# Patient Record
Sex: MALE | Race: BLACK OR AFRICAN AMERICAN | Employment: OTHER | URBAN - METROPOLITAN AREA
[De-identification: names, ages, dates, MRNs, and addresses within clinical notes are randomized per-mention and may not be internally consistent; named-entity substitution may affect disease eponyms.]

---

## 2020-12-01 ENCOUNTER — APPOINTMENT (OUTPATIENT)
Dept: CT IMAGING | Age: 20
DRG: 004 | End: 2020-12-01
Payer: MEDICAID

## 2020-12-01 ENCOUNTER — HOSPITAL ENCOUNTER (INPATIENT)
Age: 20
LOS: 10 days | Discharge: NURSING, REHAB OR LONG-TERM CARE | DRG: 004 | End: 2020-12-11
Attending: EMERGENCY MEDICINE | Admitting: SURGERY
Payer: MEDICAID

## 2020-12-01 ENCOUNTER — ANESTHESIA (OUTPATIENT)
Dept: OPERATING ROOM | Age: 20
DRG: 004 | End: 2020-12-01
Payer: MEDICAID

## 2020-12-01 ENCOUNTER — APPOINTMENT (OUTPATIENT)
Dept: GENERAL RADIOLOGY | Age: 20
DRG: 004 | End: 2020-12-01
Payer: MEDICAID

## 2020-12-01 ENCOUNTER — ANESTHESIA EVENT (OUTPATIENT)
Dept: OPERATING ROOM | Age: 20
DRG: 004 | End: 2020-12-01
Payer: MEDICAID
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
## 2020-12-01 DIAGNOSIS — S12.600A CLOSED DISPLACED FRACTURE OF SEVENTH CERVICAL VERTEBRA, UNSPECIFIED FRACTURE MORPHOLOGY, INITIAL ENCOUNTER (HCC): ICD-10-CM

## 2020-12-01 LAB
-: NORMAL
ALLEN TEST: ABNORMAL
ALLEN TEST: ABNORMAL
ALLEN TEST: POSITIVE
AMORPHOUS: NORMAL
AMPHETAMINE SCREEN URINE: NEGATIVE
ANION GAP SERPL CALCULATED.3IONS-SCNC: 12 MMOL/L (ref 9–17)
ANION GAP: 6 MMOL/L (ref 7–16)
ANION GAP: 9 MMOL/L (ref 7–16)
BACTERIA: NORMAL
BARBITURATE SCREEN URINE: NEGATIVE
BENZODIAZEPINE SCREEN, URINE: NEGATIVE
BILIRUBIN URINE: NEGATIVE
BLOOD BANK SPECIMEN: ABNORMAL
BUN BLDV-MCNC: 13 MG/DL (ref 6–20)
BUPRENORPHINE URINE: ABNORMAL
CANNABINOID SCREEN URINE: POSITIVE
CARBOXYHEMOGLOBIN: 3.5 % (ref 0–5)
CASTS UA: NORMAL /LPF (ref 0–8)
CHLORIDE BLD-SCNC: 111 MMOL/L (ref 98–107)
CO2: 22 MMOL/L (ref 20–31)
COCAINE METABOLITE, URINE: NEGATIVE
COLOR: YELLOW
COMMENT UA: ABNORMAL
CREAT SERPL-MCNC: 0.78 MG/DL (ref 0.7–1.2)
CRYSTALS, UA: NORMAL /HPF
EPITHELIAL CELLS UA: NORMAL /HPF (ref 0–5)
ETHANOL PERCENT: <0.01 %
ETHANOL: <10 MG/DL
FIO2: 100
FIO2: 40
FIO2: ABNORMAL
GFR AFRICAN AMERICAN: >60 ML/MIN
GFR NON-AFRICAN AMERICAN: >60 ML/MIN
GFR SERPL CREATININE-BSD FRML MDRD: >60 ML/MIN
GFR SERPL CREATININE-BSD FRML MDRD: >60 ML/MIN
GFR SERPL CREATININE-BSD FRML MDRD: ABNORMAL ML/MIN/{1.73_M2}
GFR SERPL CREATININE-BSD FRML MDRD: ABNORMAL ML/MIN/{1.73_M2}
GFR SERPL CREATININE-BSD FRML MDRD: NORMAL ML/MIN/{1.73_M2}
GFR SERPL CREATININE-BSD FRML MDRD: NORMAL ML/MIN/{1.73_M2}
GLUCOSE BLD-MCNC: 112 MG/DL (ref 74–100)
GLUCOSE BLD-MCNC: 123 MG/DL (ref 70–99)
GLUCOSE BLD-MCNC: 135 MG/DL (ref 74–100)
GLUCOSE URINE: NEGATIVE
HCG QUALITATIVE: ABNORMAL
HCO3 VENOUS: 22.5 MMOL/L (ref 24–30)
HCT VFR BLD CALC: 37.4 % (ref 40.7–50.3)
HEMOGLOBIN: 12.5 G/DL (ref 13–17)
INR BLD: 1.1
KETONES, URINE: NEGATIVE
LEUKOCYTE ESTERASE, URINE: ABNORMAL
MCH RBC QN AUTO: 31.1 PG (ref 25.2–33.5)
MCHC RBC AUTO-ENTMCNC: 33.4 G/DL (ref 28.4–34.8)
MCV RBC AUTO: 93 FL (ref 82.6–102.9)
MDMA URINE: ABNORMAL
METHADONE SCREEN, URINE: NEGATIVE
METHAMPHETAMINE, URINE: ABNORMAL
METHEMOGLOBIN: ABNORMAL % (ref 0–1.5)
MODE: ABNORMAL
MODE: ABNORMAL
MODE: AC
MUCUS: NORMAL
NEGATIVE BASE EXCESS, ART: 2 (ref 0–2)
NEGATIVE BASE EXCESS, ART: 2 (ref 0–2)
NEGATIVE BASE EXCESS, VEN: 2.3 MMOL/L (ref 0–2)
NITRITE, URINE: NEGATIVE
NOTIFICATION TIME: ABNORMAL
NOTIFICATION: ABNORMAL
NRBC AUTOMATED: 0 PER 100 WBC
O2 DEVICE/FLOW/%: ABNORMAL
O2 SAT, VEN: 97.2 % (ref 60–85)
OPIATES, URINE: NEGATIVE
OTHER OBSERVATIONS UA: NORMAL
OXYCODONE SCREEN URINE: NEGATIVE
OXYHEMOGLOBIN: ABNORMAL % (ref 95–98)
PARTIAL THROMBOPLASTIN TIME: 21 SEC (ref 20.5–30.5)
PATIENT TEMP: 37
PATIENT TEMP: ABNORMAL
PATIENT TEMP: ABNORMAL
PCO2, VEN, TEMP ADJ: ABNORMAL MMHG (ref 39–55)
PCO2, VEN: 41.3 (ref 39–55)
PDW BLD-RTO: 13.3 % (ref 11.8–14.4)
PEEP/CPAP: ABNORMAL
PH UA: 8 (ref 5–8)
PH VENOUS: 7.36 (ref 7.32–7.42)
PH, VEN, TEMP ADJ: ABNORMAL (ref 7.32–7.42)
PHENCYCLIDINE, URINE: NEGATIVE
PLATELET # BLD: 184 K/UL (ref 138–453)
PMV BLD AUTO: 10.5 FL (ref 8.1–13.5)
PO2, VEN, TEMP ADJ: ABNORMAL MMHG (ref 30–50)
PO2, VEN: 102 (ref 30–50)
POC CHLORIDE: 105 MMOL/L (ref 98–107)
POC CHLORIDE: 107 MMOL/L (ref 98–107)
POC CREATININE: 0.88 MG/DL (ref 0.51–1.19)
POC CREATININE: 0.9 MG/DL (ref 0.51–1.19)
POC HCO3: 27.1 MMOL/L (ref 21–28)
POC HCO3: 27.5 MMOL/L (ref 21–28)
POC HEMATOCRIT: 30 % (ref 41–53)
POC HEMATOCRIT: 33 % (ref 41–53)
POC HEMOGLOBIN: 10.3 G/DL (ref 13.5–17.5)
POC HEMOGLOBIN: 11.2 G/DL (ref 13.5–17.5)
POC IONIZED CALCIUM: 1.19 MMOL/L (ref 1.15–1.33)
POC IONIZED CALCIUM: 1.22 MMOL/L (ref 1.15–1.33)
POC LACTIC ACID: 0.86 MMOL/L (ref 0.56–1.39)
POC LACTIC ACID: 1.22 MMOL/L (ref 0.56–1.39)
POC O2 SATURATION: 100 % (ref 94–98)
POC O2 SATURATION: 99 % (ref 94–98)
POC PCO2 TEMP: ABNORMAL MM HG
POC PCO2 TEMP: ABNORMAL MM HG
POC PCO2: 70.9 MM HG (ref 35–48)
POC PCO2: 75 MM HG (ref 35–48)
POC PH TEMP: ABNORMAL
POC PH TEMP: ABNORMAL
POC PH: 7.17 (ref 7.35–7.45)
POC PH: 7.19 (ref 7.35–7.45)
POC PO2 TEMP: ABNORMAL MM HG
POC PO2 TEMP: ABNORMAL MM HG
POC PO2: 168 MM HG (ref 83–108)
POC PO2: 566.3 MM HG (ref 83–108)
POC POTASSIUM: 5.4 MMOL/L (ref 3.5–4.5)
POC POTASSIUM: 6.3 MMOL/L (ref 3.5–4.5)
POC SODIUM: 140 MMOL/L (ref 138–146)
POC SODIUM: 141 MMOL/L (ref 138–146)
POSITIVE BASE EXCESS, ART: ABNORMAL (ref 0–3)
POSITIVE BASE EXCESS, ART: ABNORMAL (ref 0–3)
POSITIVE BASE EXCESS, VEN: ABNORMAL MMOL/L (ref 0–2)
POTASSIUM SERPL-SCNC: 3.2 MMOL/L (ref 3.7–5.3)
PROPOXYPHENE, URINE: ABNORMAL
PROTEIN UA: NEGATIVE
PROTHROMBIN TIME: 11.8 SEC (ref 9–12)
PSV: ABNORMAL
PT. POSITION: ABNORMAL
RBC # BLD: 4.02 M/UL (ref 4.21–5.77)
RBC UA: NORMAL /HPF (ref 0–4)
RENAL EPITHELIAL, UA: NORMAL /HPF
RESPIRATORY RATE: ABNORMAL
SAMPLE SITE: ABNORMAL
SARS-COV-2, RAPID: NOT DETECTED
SARS-COV-2: NORMAL
SARS-COV-2: NORMAL
SET RATE: ABNORMAL
SODIUM BLD-SCNC: 145 MMOL/L (ref 135–144)
SOURCE: NORMAL
SPECIFIC GRAVITY UA: 1.05 (ref 1–1.03)
TCO2 (CALC), ART: 29 MMOL/L (ref 22–29)
TCO2 (CALC), ART: 30 MMOL/L (ref 22–29)
TEST INFORMATION: ABNORMAL
TEXT FOR RESPIRATORY: ABNORMAL
TOTAL HB: ABNORMAL G/DL (ref 12–16)
TOTAL RATE: ABNORMAL
TRICHOMONAS: NORMAL
TRICYCLIC ANTIDEPRESSANTS, UR: ABNORMAL
TURBIDITY: ABNORMAL
URINE HGB: NEGATIVE
UROBILINOGEN, URINE: NORMAL
VT: ABNORMAL
WBC # BLD: 12.9 K/UL (ref 4.5–13.5)
WBC UA: NORMAL /HPF (ref 0–5)
YEAST: NORMAL

## 2020-12-01 PROCEDURE — L0120 CERV FLEX N/ADJ FOAM PRE OTS: HCPCS | Performed by: NEUROLOGICAL SURGERY

## 2020-12-01 PROCEDURE — 85014 HEMATOCRIT: CPT

## 2020-12-01 PROCEDURE — 3209999900 CT THORACIC SPINE TRAUMA RECONSTRUCTION

## 2020-12-01 PROCEDURE — 5A1955Z RESPIRATORY VENTILATION, GREATER THAN 96 CONSECUTIVE HOURS: ICD-10-PCS | Performed by: SURGERY

## 2020-12-01 PROCEDURE — 6360000002 HC RX W HCPCS

## 2020-12-01 PROCEDURE — 86850 RBC ANTIBODY SCREEN: CPT

## 2020-12-01 PROCEDURE — 71045 X-RAY EXAM CHEST 1 VIEW: CPT

## 2020-12-01 PROCEDURE — 74174 CTA ABD&PLVS W/CONTRAST: CPT

## 2020-12-01 PROCEDURE — 6360000004 HC RX CONTRAST MEDICATION: Performed by: STUDENT IN AN ORGANIZED HEALTH CARE EDUCATION/TRAINING PROGRAM

## 2020-12-01 PROCEDURE — 2580000003 HC RX 258: Performed by: NURSE ANESTHETIST, CERTIFIED REGISTERED

## 2020-12-01 PROCEDURE — 0RT30ZZ RESECTION OF CERVICAL VERTEBRAL DISC, OPEN APPROACH: ICD-10-PCS | Performed by: NEUROLOGICAL SURGERY

## 2020-12-01 PROCEDURE — 22554 ARTHRD ANT NTRBD MIN DSC CRV: CPT | Performed by: NEUROLOGICAL SURGERY

## 2020-12-01 PROCEDURE — 81001 URINALYSIS AUTO W/SCOPE: CPT

## 2020-12-01 PROCEDURE — 80051 ELECTROLYTE PANEL: CPT

## 2020-12-01 PROCEDURE — 0RG4070 FUSION OF CERVICOTHORACIC VERTEBRAL JOINT WITH AUTOLOGOUS TISSUE SUBSTITUTE, ANTERIOR APPROACH, ANTERIOR COLUMN, OPEN APPROACH: ICD-10-PCS | Performed by: NEUROLOGICAL SURGERY

## 2020-12-01 PROCEDURE — 84295 ASSAY OF SERUM SODIUM: CPT

## 2020-12-01 PROCEDURE — 2780000010 HC IMPLANT OTHER: Performed by: NEUROLOGICAL SURGERY

## 2020-12-01 PROCEDURE — 3700000001 HC ADD 15 MINUTES (ANESTHESIA): Performed by: NEUROLOGICAL SURGERY

## 2020-12-01 PROCEDURE — 70450 CT HEAD/BRAIN W/O DYE: CPT

## 2020-12-01 PROCEDURE — 99254 IP/OBS CNSLTJ NEW/EST MOD 60: CPT | Performed by: SURGERY

## 2020-12-01 PROCEDURE — 83605 ASSAY OF LACTIC ACID: CPT

## 2020-12-01 PROCEDURE — 6360000002 HC RX W HCPCS: Performed by: NURSE ANESTHETIST, CERTIFIED REGISTERED

## 2020-12-01 PROCEDURE — 82947 ASSAY GLUCOSE BLOOD QUANT: CPT

## 2020-12-01 PROCEDURE — 2700000000 HC OXYGEN THERAPY PER DAY

## 2020-12-01 PROCEDURE — 22845 INSERT SPINE FIXATION DEVICE: CPT | Performed by: NEUROLOGICAL SURGERY

## 2020-12-01 PROCEDURE — 22854 INSJ BIOMECHANICAL DEVICE: CPT | Performed by: NEUROLOGICAL SURGERY

## 2020-12-01 PROCEDURE — 0RG10A0 FUSION OF CERVICAL VERTEBRAL JOINT WITH INTERBODY FUSION DEVICE, ANTERIOR APPROACH, ANTERIOR COLUMN, OPEN APPROACH: ICD-10-PCS | Performed by: NEUROLOGICAL SURGERY

## 2020-12-01 PROCEDURE — C1889 IMPLANT/INSERT DEVICE, NOC: HCPCS | Performed by: NEUROLOGICAL SURGERY

## 2020-12-01 PROCEDURE — 70250 X-RAY EXAM OF SKULL: CPT

## 2020-12-01 PROCEDURE — 0DJ08ZZ INSPECTION OF UPPER INTESTINAL TRACT, VIA NATURAL OR ARTIFICIAL OPENING ENDOSCOPIC: ICD-10-PCS | Performed by: SURGERY

## 2020-12-01 PROCEDURE — 99284 EMERGENCY DEPT VISIT MOD MDM: CPT

## 2020-12-01 PROCEDURE — 99223 1ST HOSP IP/OBS HIGH 75: CPT | Performed by: NEUROLOGICAL SURGERY

## 2020-12-01 PROCEDURE — 37799 UNLISTED PX VASCULAR SURGERY: CPT

## 2020-12-01 PROCEDURE — 80307 DRUG TEST PRSMV CHEM ANLYZR: CPT

## 2020-12-01 PROCEDURE — 2709999900 HC NON-CHARGEABLE SUPPLY: Performed by: NEUROLOGICAL SURGERY

## 2020-12-01 PROCEDURE — 99283 EMERGENCY DEPT VISIT LOW MDM: CPT

## 2020-12-01 PROCEDURE — U0002 COVID-19 LAB TEST NON-CDC: HCPCS

## 2020-12-01 PROCEDURE — 86900 BLOOD TYPING SEROLOGIC ABO: CPT

## 2020-12-01 PROCEDURE — 84132 ASSAY OF SERUM POTASSIUM: CPT

## 2020-12-01 PROCEDURE — 85027 COMPLETE CBC AUTOMATED: CPT

## 2020-12-01 PROCEDURE — 00UT0KZ SUPPLEMENT SPINAL MENINGES WITH NONAUTOLOGOUS TISSUE SUBSTITUTE, OPEN APPROACH: ICD-10-PCS | Performed by: NEUROLOGICAL SURGERY

## 2020-12-01 PROCEDURE — 82803 BLOOD GASES ANY COMBINATION: CPT

## 2020-12-01 PROCEDURE — 3700000000 HC ANESTHESIA ATTENDED CARE: Performed by: NEUROLOGICAL SURGERY

## 2020-12-01 PROCEDURE — 06HY33Z INSERTION OF INFUSION DEVICE INTO LOWER VEIN, PERCUTANEOUS APPROACH: ICD-10-PCS | Performed by: SURGERY

## 2020-12-01 PROCEDURE — C1729 CATH, DRAINAGE: HCPCS | Performed by: NEUROLOGICAL SURGERY

## 2020-12-01 PROCEDURE — 0CQ1XZZ REPAIR LOWER LIP, EXTERNAL APPROACH: ICD-10-PCS | Performed by: SURGERY

## 2020-12-01 PROCEDURE — 0BH18EZ INSERTION OF ENDOTRACHEAL AIRWAY INTO TRACHEA, VIA NATURAL OR ARTIFICIAL OPENING ENDOSCOPIC: ICD-10-PCS | Performed by: EMERGENCY MEDICINE

## 2020-12-01 PROCEDURE — G0480 DRUG TEST DEF 1-7 CLASSES: HCPCS

## 2020-12-01 PROCEDURE — 86920 COMPATIBILITY TEST SPIN: CPT

## 2020-12-01 PROCEDURE — 82435 ASSAY OF BLOOD CHLORIDE: CPT

## 2020-12-01 PROCEDURE — 85730 THROMBOPLASTIN TIME PARTIAL: CPT

## 2020-12-01 PROCEDURE — 3600000016 HC SURGERY LEVEL 6 ADDTL 15MIN: Performed by: NEUROLOGICAL SURGERY

## 2020-12-01 PROCEDURE — C1713 ANCHOR/SCREW BN/BN,TIS/BN: HCPCS | Performed by: NEUROLOGICAL SURGERY

## 2020-12-01 PROCEDURE — 70486 CT MAXILLOFACIAL W/O DYE: CPT

## 2020-12-01 PROCEDURE — 85018 HEMOGLOBIN: CPT

## 2020-12-01 PROCEDURE — 31500 INSERT EMERGENCY AIRWAY: CPT

## 2020-12-01 PROCEDURE — 63081 REMOVE VERT BODY DCMPRN CRVL: CPT | Performed by: NEUROLOGICAL SURGERY

## 2020-12-01 PROCEDURE — 2500000003 HC RX 250 WO HCPCS

## 2020-12-01 PROCEDURE — 82330 ASSAY OF CALCIUM: CPT

## 2020-12-01 PROCEDURE — 84703 CHORIONIC GONADOTROPIN ASSAY: CPT

## 2020-12-01 PROCEDURE — 86901 BLOOD TYPING SEROLOGIC RH(D): CPT

## 2020-12-01 PROCEDURE — 2000000000 HC ICU R&B

## 2020-12-01 PROCEDURE — 94770 HC ETCO2 MONITOR DAILY: CPT

## 2020-12-01 PROCEDURE — 82565 ASSAY OF CREATININE: CPT

## 2020-12-01 PROCEDURE — 82805 BLOOD GASES W/O2 SATURATION: CPT

## 2020-12-01 PROCEDURE — 84520 ASSAY OF UREA NITROGEN: CPT

## 2020-12-01 PROCEDURE — 70498 CT ANGIOGRAPHY NECK: CPT

## 2020-12-01 PROCEDURE — 22585 ARTHRD ANT NTRBD MIN DSC EA: CPT | Performed by: NEUROLOGICAL SURGERY

## 2020-12-01 PROCEDURE — 85610 PROTHROMBIN TIME: CPT

## 2020-12-01 PROCEDURE — 3600000006 HC SURGERY LEVEL 6 BASE: Performed by: NEUROLOGICAL SURGERY

## 2020-12-01 PROCEDURE — 3209999900 CT LUMBAR SPINE TRAUMA RECONSTRUCTION

## 2020-12-01 PROCEDURE — 94761 N-INVAS EAR/PLS OXIMETRY MLT: CPT

## 2020-12-01 PROCEDURE — 0RT50ZZ RESECTION OF CERVICOTHORACIC VERTEBRAL DISC, OPEN APPROACH: ICD-10-PCS | Performed by: NEUROLOGICAL SURGERY

## 2020-12-01 PROCEDURE — 2500000003 HC RX 250 WO HCPCS: Performed by: NURSE ANESTHETIST, CERTIFIED REGISTERED

## 2020-12-01 PROCEDURE — 2720000010 HC SURG SUPPLY STERILE: Performed by: NEUROLOGICAL SURGERY

## 2020-12-01 PROCEDURE — 72125 CT NECK SPINE W/O DYE: CPT

## 2020-12-01 DEVICE — IMPLANTABLE DEVICE: Type: IMPLANTABLE DEVICE | Site: SPINE CERVICAL | Status: FUNCTIONAL

## 2020-12-01 DEVICE — CAGE SPNL W12XH22XL14MM 6DEG STD CERV THORLUM C FBR POLYMER: Type: IMPLANTABLE DEVICE | Site: SPINE CERVICAL | Status: FUNCTIONAL

## 2020-12-01 DEVICE — SCREW SPNL L16MM DIA4MM ANT CERV TI SELF DRL VAR ANG FULL: Type: IMPLANTABLE DEVICE | Site: SPINE CERVICAL | Status: FUNCTIONAL

## 2020-12-01 DEVICE — DURASEAL® EXACT SPINAL SEALANT SYSTEM 5ML 5 PACK
Type: IMPLANTABLE DEVICE | Site: SPINE CERVICAL | Status: FUNCTIONAL
Brand: DURASEAL EXACT SPINAL SEALANT SYSTEM

## 2020-12-01 DEVICE — COLLAGEN DURAL REGENERATION MEMBRANE 1IN X 1IN (2.5CM X 2.5CM)
Type: IMPLANTABLE DEVICE | Site: SPINE CERVICAL | Status: FUNCTIONAL
Brand: DURAMATRIX-ONLAY PLUS

## 2020-12-01 RX ORDER — ROCURONIUM BROMIDE 10 MG/ML
INJECTION, SOLUTION INTRAVENOUS PRN
Status: DISCONTINUED | OUTPATIENT
Start: 2020-12-01 | End: 2020-12-02 | Stop reason: SDUPTHER

## 2020-12-01 RX ORDER — EPINEPHRINE 0.1 MG/ML
SYRINGE (ML) INJECTION
Status: DISCONTINUED
Start: 2020-12-01 | End: 2020-12-02

## 2020-12-01 RX ORDER — PROPOFOL 10 MG/ML
INJECTION, EMULSION INTRAVENOUS
Status: DISCONTINUED
Start: 2020-12-01 | End: 2020-12-02

## 2020-12-01 RX ORDER — 0.9 % SODIUM CHLORIDE 0.9 %
20 INTRAVENOUS SOLUTION INTRAVENOUS ONCE
Status: DISCONTINUED | OUTPATIENT
Start: 2020-12-01 | End: 2020-12-02

## 2020-12-01 RX ORDER — NOREPINEPHRINE BIT/0.9 % NACL 16MG/250ML
INFUSION BOTTLE (ML) INTRAVENOUS
Status: DISCONTINUED
Start: 2020-12-01 | End: 2020-12-02

## 2020-12-01 RX ORDER — CALCIUM CHLORIDE 100 MG/ML
INJECTION INTRAVENOUS; INTRAVENTRICULAR PRN
Status: DISCONTINUED | OUTPATIENT
Start: 2020-12-01 | End: 2020-12-02 | Stop reason: SDUPTHER

## 2020-12-01 RX ORDER — FENTANYL CITRATE 50 UG/ML
INJECTION, SOLUTION INTRAMUSCULAR; INTRAVENOUS PRN
Status: DISCONTINUED | OUTPATIENT
Start: 2020-12-01 | End: 2020-12-02 | Stop reason: SDUPTHER

## 2020-12-01 RX ORDER — CEFAZOLIN SODIUM 2 G/50ML
SOLUTION INTRAVENOUS PRN
Status: DISCONTINUED | OUTPATIENT
Start: 2020-12-01 | End: 2020-12-02 | Stop reason: SDUPTHER

## 2020-12-01 RX ORDER — MIDAZOLAM HYDROCHLORIDE 1 MG/ML
INJECTION INTRAMUSCULAR; INTRAVENOUS
Status: DISCONTINUED
Start: 2020-12-01 | End: 2020-12-01 | Stop reason: WASHOUT

## 2020-12-01 RX ADMIN — IOPAMIDOL 90 ML: 755 INJECTION, SOLUTION INTRAVENOUS at 20:49

## 2020-12-01 RX ADMIN — FENTANYL CITRATE 50 MCG: 50 INJECTION, SOLUTION INTRAMUSCULAR; INTRAVENOUS at 23:38

## 2020-12-01 RX ADMIN — CALCIUM CHLORIDE 1 G: 100 INJECTION, SOLUTION INTRAVENOUS; INTRAVENTRICULAR at 23:31

## 2020-12-01 RX ADMIN — IOPAMIDOL 100 ML: 755 INJECTION, SOLUTION INTRAVENOUS at 20:51

## 2020-12-01 RX ADMIN — ROCURONIUM BROMIDE 30 MG: 10 INJECTION, SOLUTION INTRAVENOUS at 23:50

## 2020-12-01 RX ADMIN — PHENYLEPHRINE HYDROCHLORIDE 100 MCG: 10 INJECTION INTRAVENOUS at 23:42

## 2020-12-01 RX ADMIN — SODIUM CHLORIDE, POTASSIUM CHLORIDE, SODIUM LACTATE AND CALCIUM CHLORIDE: 600; 310; 30; 20 INJECTION, SOLUTION INTRAVENOUS at 23:27

## 2020-12-01 RX ADMIN — ROCURONIUM BROMIDE 50 MG: 10 INJECTION, SOLUTION INTRAVENOUS at 23:31

## 2020-12-01 RX ADMIN — CEFAZOLIN SODIUM 2 G: 2 SOLUTION INTRAVENOUS at 23:57

## 2020-12-01 ASSESSMENT — PULMONARY FUNCTION TESTS
PIF_VALUE: 18
PIF_VALUE: 18
PIF_VALUE: 19
PIF_VALUE: 18
PIF_VALUE: 18
PIF_VALUE: 16
PIF_VALUE: 18
PIF_VALUE: 21
PIF_VALUE: 18
PIF_VALUE: 22
PIF_VALUE: 16
PIF_VALUE: 16
PIF_VALUE: 18
PIF_VALUE: 16
PIF_VALUE: 17
PIF_VALUE: 21
PIF_VALUE: 18
PIF_VALUE: 17
PIF_VALUE: 16
PIF_VALUE: 18
PIF_VALUE: 18
PIF_VALUE: 17
PIF_VALUE: 16
PIF_VALUE: 16
PIF_VALUE: 18
PIF_VALUE: 16
PIF_VALUE: 16
PIF_VALUE: 18
PIF_VALUE: 10
PIF_VALUE: 18

## 2020-12-02 ENCOUNTER — ANESTHESIA EVENT (OUTPATIENT)
Dept: OPERATING ROOM | Age: 20
DRG: 004 | End: 2020-12-02
Payer: MEDICAID

## 2020-12-02 ENCOUNTER — APPOINTMENT (OUTPATIENT)
Dept: GENERAL RADIOLOGY | Age: 20
DRG: 004 | End: 2020-12-02
Payer: MEDICAID

## 2020-12-02 ENCOUNTER — APPOINTMENT (OUTPATIENT)
Dept: CT IMAGING | Age: 20
DRG: 004 | End: 2020-12-02
Payer: MEDICAID

## 2020-12-02 ENCOUNTER — APPOINTMENT (OUTPATIENT)
Dept: INTERVENTIONAL RADIOLOGY/VASCULAR | Age: 20
DRG: 004 | End: 2020-12-02
Payer: MEDICAID

## 2020-12-02 VITALS
TEMPERATURE: 98.9 F | RESPIRATION RATE: 12 BRPM | DIASTOLIC BLOOD PRESSURE: 69 MMHG | OXYGEN SATURATION: 100 % | SYSTOLIC BLOOD PRESSURE: 97 MMHG

## 2020-12-02 PROBLEM — S22.32XA FRACTURE OF ONE RIB OF LEFT SIDE: Status: ACTIVE | Noted: 2020-12-02

## 2020-12-02 PROBLEM — S02.401A MAXILLARY SINUS FRACTURE (HCC): Status: ACTIVE | Noted: 2020-12-02

## 2020-12-02 PROBLEM — S02.19XA FRONTAL SINUS FRACTURE (HCC): Status: ACTIVE | Noted: 2020-12-02

## 2020-12-02 PROBLEM — D62 ACUTE BLOOD LOSS ANEMIA: Status: ACTIVE | Noted: 2020-12-02

## 2020-12-02 PROBLEM — S12.600A C7 CERVICAL FRACTURE (HCC): Status: ACTIVE | Noted: 2020-12-02

## 2020-12-02 PROBLEM — S05.31XA RUPTURED GLOBE OF RIGHT EYE: Status: ACTIVE | Noted: 2020-12-02

## 2020-12-02 PROBLEM — I60.9 SAH (SUBARACHNOID HEMORRHAGE) (HCC): Status: ACTIVE | Noted: 2020-12-02

## 2020-12-02 PROBLEM — S02.85XA ORBITAL FRACTURE (HCC): Status: ACTIVE | Noted: 2020-12-02

## 2020-12-02 PROBLEM — S27.322A CONTUSION OF BOTH LUNGS: Status: ACTIVE | Noted: 2020-12-02

## 2020-12-02 PROBLEM — S02.609A FRACTURE OF RIGHT SIDE OF MANDIBLE (HCC): Status: ACTIVE | Noted: 2020-12-02

## 2020-12-02 LAB
ABSOLUTE EOS #: <0.03 K/UL (ref 0–0.44)
ABSOLUTE IMMATURE GRANULOCYTE: 0.05 K/UL (ref 0–0.3)
ABSOLUTE LYMPH #: 3.85 K/UL (ref 1.1–3.7)
ABSOLUTE MONO #: 1.04 K/UL (ref 0.1–1.4)
ALLEN TEST: ABNORMAL
ALLEN TEST: ABNORMAL
ANION GAP SERPL CALCULATED.3IONS-SCNC: 11 MMOL/L (ref 9–17)
BASOPHILS # BLD: 0 % (ref 0–2)
BASOPHILS ABSOLUTE: 0.03 K/UL (ref 0–0.2)
BUN BLDV-MCNC: 12 MG/DL (ref 6–20)
BUN/CREAT BLD: ABNORMAL (ref 9–20)
CALCIUM IONIZED: 1.34 MMOL/L (ref 1.13–1.33)
CALCIUM SERPL-MCNC: 7.9 MG/DL (ref 8.6–10.4)
CARBOXYHEMOGLOBIN: 1.8 % (ref 0–5)
CHLORIDE BLD-SCNC: 111 MMOL/L (ref 98–107)
CHLORIDE, WHOLE BLOOD: 115 MMOL/L (ref 98–110)
CO2: 20 MMOL/L (ref 20–31)
CREAT SERPL-MCNC: 0.76 MG/DL (ref 0.7–1.2)
DIFFERENTIAL TYPE: ABNORMAL
EKG ATRIAL RATE: 60 BPM
EKG P AXIS: 61 DEGREES
EKG P-R INTERVAL: 112 MS
EKG Q-T INTERVAL: 458 MS
EKG QRS DURATION: 96 MS
EKG QTC CALCULATION (BAZETT): 458 MS
EKG R AXIS: 83 DEGREES
EKG T AXIS: 72 DEGREES
EKG VENTRICULAR RATE: 60 BPM
EOSINOPHILS RELATIVE PERCENT: 0 % (ref 1–4)
ESTIMATED AVERAGE GLUCOSE: 111 MG/DL
FIO2: 100
FIO2: 60
GFR AFRICAN AMERICAN: >60 ML/MIN
GFR NON-AFRICAN AMERICAN: >60 ML/MIN
GFR SERPL CREATININE-BSD FRML MDRD: ABNORMAL ML/MIN/{1.73_M2}
GFR SERPL CREATININE-BSD FRML MDRD: ABNORMAL ML/MIN/{1.73_M2}
GLUCOSE BLD-MCNC: 106 MG/DL (ref 75–110)
GLUCOSE BLD-MCNC: 112 MG/DL (ref 75–110)
GLUCOSE BLD-MCNC: 115 MG/DL (ref 75–110)
GLUCOSE BLD-MCNC: 118 MG/DL (ref 75–110)
GLUCOSE BLD-MCNC: 130 MG/DL (ref 75–110)
GLUCOSE BLD-MCNC: 134 MG/DL (ref 70–99)
HBA1C MFR BLD: 5.5 % (ref 4–6)
HCO3 ARTERIAL: 21 MMOL/L (ref 22–27)
HCT VFR BLD CALC: 27 % (ref 40.7–50.3)
HCT VFR BLD CALC: 34.6 %
HEMOGLOBIN: 11.2 GM/DL
HEMOGLOBIN: 9.2 G/DL (ref 13–17)
IMMATURE GRANULOCYTES: 0 %
LYMPHOCYTES # BLD: 30 % (ref 25–45)
MAGNESIUM: 1.4 MG/DL (ref 1.6–2.6)
MCH RBC QN AUTO: 31.3 PG (ref 25.2–33.5)
MCHC RBC AUTO-ENTMCNC: 34.1 G/DL (ref 28.4–34.8)
MCV RBC AUTO: 91.8 FL (ref 82.6–102.9)
METHEMOGLOBIN: ABNORMAL % (ref 0–1.5)
MODE: ABNORMAL
MODE: ABNORMAL
MONOCYTES # BLD: 8 % (ref 2–8)
NEGATIVE BASE EXCESS, ART: 1 (ref 0–2)
NEGATIVE BASE EXCESS, ART: 3.1 MMOL/L (ref 0–2)
NOTIFICATION TIME: ABNORMAL
NOTIFICATION: ABNORMAL
NRBC AUTOMATED: 0 PER 100 WBC
O2 DEVICE/FLOW/%: ABNORMAL
O2 DEVICE/FLOW/%: ABNORMAL
O2 SAT, ARTERIAL: 99.6 % (ref 94–100)
OXYHEMOGLOBIN: ABNORMAL % (ref 95–98)
PATIENT TEMP: 35.5
PATIENT TEMP: ABNORMAL
PCO2 ARTERIAL: 36.1 MMHG (ref 32–45)
PCO2, ART, TEMP ADJ: 33.5 (ref 32–45)
PDW BLD-RTO: 13.4 % (ref 11.8–14.4)
PEEP/CPAP: ABNORMAL
PH ARTERIAL: 7.38 (ref 7.35–7.45)
PH, ART, TEMP ADJ: 7.41 (ref 7.35–7.45)
PHOSPHORUS: 4 MG/DL (ref 2.5–4.5)
PLATELET # BLD: 141 K/UL (ref 138–453)
PLATELET ESTIMATE: ABNORMAL
PMV BLD AUTO: 10.5 FL (ref 8.1–13.5)
PO2 ARTERIAL: 470 MMHG (ref 75–95)
PO2, ART, TEMP ADJ: 461 MMHG (ref 75–95)
POC HCO3: 23.3 MMOL/L (ref 21–28)
POC LACTIC ACID: 1.09 MMOL/L (ref 0.56–1.39)
POC O2 SATURATION: 100 % (ref 94–98)
POC PCO2 TEMP: ABNORMAL MM HG
POC PCO2: 35.7 MM HG (ref 35–48)
POC PH TEMP: ABNORMAL
POC PH: 7.42 (ref 7.35–7.45)
POC PO2 TEMP: ABNORMAL MM HG
POC PO2: 362.5 MM HG (ref 83–108)
POSITIVE BASE EXCESS, ART: ABNORMAL (ref 0–3)
POSITIVE BASE EXCESS, ART: ABNORMAL MMOL/L (ref 0–2)
POTASSIUM SERPL-SCNC: 4 MMOL/L (ref 3.7–5.3)
POTASSIUM, WHOLE BLOOD: 4.9 MMOL/L (ref 3.6–5)
PSV: ABNORMAL
PT. POSITION: ABNORMAL
RBC # BLD: 2.94 M/UL (ref 4.21–5.77)
RBC # BLD: ABNORMAL 10*6/UL
RESPIRATORY RATE: ABNORMAL
SAMPLE SITE: ABNORMAL
SAMPLE SITE: ABNORMAL
SEG NEUTROPHILS: 61 % (ref 34–64)
SEGMENTED NEUTROPHILS ABSOLUTE COUNT: 7.73 K/UL (ref 1.5–8.1)
SET RATE: ABNORMAL
SODIUM BLD-SCNC: 142 MMOL/L (ref 135–144)
SODIUM, WHOLE BLOOD: 139 MMOL/L (ref 136–145)
TCO2 (CALC), ART: 24 MMOL/L (ref 22–29)
TEXT FOR RESPIRATORY: ABNORMAL
TOTAL HB: ABNORMAL G/DL (ref 12–16)
TOTAL RATE: ABNORMAL
VT: ABNORMAL
WBC # BLD: 12.7 K/UL (ref 4.5–13.5)
WBC # BLD: ABNORMAL 10*3/UL

## 2020-12-02 PROCEDURE — 2580000003 HC RX 258: Performed by: STUDENT IN AN ORGANIZED HEALTH CARE EDUCATION/TRAINING PROGRAM

## 2020-12-02 PROCEDURE — 6360000002 HC RX W HCPCS

## 2020-12-02 PROCEDURE — 6370000000 HC RX 637 (ALT 250 FOR IP): Performed by: STUDENT IN AN ORGANIZED HEALTH CARE EDUCATION/TRAINING PROGRAM

## 2020-12-02 PROCEDURE — 85025 COMPLETE CBC W/AUTO DIFF WBC: CPT

## 2020-12-02 PROCEDURE — 3209999900 FLUORO FOR SURGICAL PROCEDURES

## 2020-12-02 PROCEDURE — 6370000000 HC RX 637 (ALT 250 FOR IP): Performed by: NURSE PRACTITIONER

## 2020-12-02 PROCEDURE — 87641 MR-STAPH DNA AMP PROBE: CPT

## 2020-12-02 PROCEDURE — 6360000002 HC RX W HCPCS: Performed by: STUDENT IN AN ORGANIZED HEALTH CARE EDUCATION/TRAINING PROGRAM

## 2020-12-02 PROCEDURE — 84100 ASSAY OF PHOSPHORUS: CPT

## 2020-12-02 PROCEDURE — 70450 CT HEAD/BRAIN W/O DYE: CPT

## 2020-12-02 PROCEDURE — 94003 VENT MGMT INPAT SUBQ DAY: CPT

## 2020-12-02 PROCEDURE — APPSS45 APP SPLIT SHARED TIME 31-45 MINUTES: Performed by: REGISTERED NURSE

## 2020-12-02 PROCEDURE — 2700000000 HC OXYGEN THERAPY PER DAY

## 2020-12-02 PROCEDURE — 2500000003 HC RX 250 WO HCPCS

## 2020-12-02 PROCEDURE — 99233 SBSQ HOSP IP/OBS HIGH 50: CPT | Performed by: NEUROLOGICAL SURGERY

## 2020-12-02 PROCEDURE — 2500000003 HC RX 250 WO HCPCS: Performed by: NURSE ANESTHETIST, CERTIFIED REGISTERED

## 2020-12-02 PROCEDURE — 6360000002 HC RX W HCPCS: Performed by: NURSE ANESTHETIST, CERTIFIED REGISTERED

## 2020-12-02 PROCEDURE — 93005 ELECTROCARDIOGRAM TRACING: CPT | Performed by: STUDENT IN AN ORGANIZED HEALTH CARE EDUCATION/TRAINING PROGRAM

## 2020-12-02 PROCEDURE — 80048 BASIC METABOLIC PNL TOTAL CA: CPT

## 2020-12-02 PROCEDURE — 72126 CT NECK SPINE W/DYE: CPT

## 2020-12-02 PROCEDURE — 83735 ASSAY OF MAGNESIUM: CPT

## 2020-12-02 PROCEDURE — 6370000000 HC RX 637 (ALT 250 FOR IP): Performed by: NEUROLOGICAL SURGERY

## 2020-12-02 PROCEDURE — 73030 X-RAY EXAM OF SHOULDER: CPT

## 2020-12-02 PROCEDURE — 94770 HC ETCO2 MONITOR DAILY: CPT

## 2020-12-02 PROCEDURE — 82947 ASSAY GLUCOSE BLOOD QUANT: CPT

## 2020-12-02 PROCEDURE — 94761 N-INVAS EAR/PLS OXIMETRY MLT: CPT

## 2020-12-02 PROCEDURE — 71045 X-RAY EXAM CHEST 1 VIEW: CPT

## 2020-12-02 PROCEDURE — 83036 HEMOGLOBIN GLYCOSYLATED A1C: CPT

## 2020-12-02 PROCEDURE — 94002 VENT MGMT INPAT INIT DAY: CPT

## 2020-12-02 PROCEDURE — 6360000002 HC RX W HCPCS: Performed by: REGISTERED NURSE

## 2020-12-02 PROCEDURE — 2580000003 HC RX 258: Performed by: NEUROLOGICAL SURGERY

## 2020-12-02 PROCEDURE — 2709999900 HC NON-CHARGEABLE SUPPLY

## 2020-12-02 PROCEDURE — 62284 INJECTION FOR MYELOGRAM: CPT

## 2020-12-02 PROCEDURE — 2000000000 HC ICU R&B

## 2020-12-02 PROCEDURE — 99232 SBSQ HOSP IP/OBS MODERATE 35: CPT | Performed by: SURGERY

## 2020-12-02 PROCEDURE — 2500000003 HC RX 250 WO HCPCS: Performed by: STUDENT IN AN ORGANIZED HEALTH CARE EDUCATION/TRAINING PROGRAM

## 2020-12-02 PROCEDURE — 82803 BLOOD GASES ANY COMBINATION: CPT

## 2020-12-02 PROCEDURE — 2500000003 HC RX 250 WO HCPCS: Performed by: NEUROLOGICAL SURGERY

## 2020-12-02 PROCEDURE — 6360000004 HC RX CONTRAST MEDICATION: Performed by: SURGERY

## 2020-12-02 PROCEDURE — 83605 ASSAY OF LACTIC ACID: CPT

## 2020-12-02 PROCEDURE — 6360000002 HC RX W HCPCS: Performed by: NURSE PRACTITIONER

## 2020-12-02 RX ORDER — PROPOFOL 10 MG/ML
10 INJECTION, EMULSION INTRAVENOUS CONTINUOUS
Status: DISCONTINUED | OUTPATIENT
Start: 2020-12-02 | End: 2020-12-06

## 2020-12-02 RX ORDER — MIDAZOLAM HYDROCHLORIDE 2 MG/2ML
2 INJECTION, SOLUTION INTRAMUSCULAR; INTRAVENOUS ONCE
Status: COMPLETED | OUTPATIENT
Start: 2020-12-02 | End: 2020-12-02

## 2020-12-02 RX ORDER — SODIUM CHLORIDE 9 MG/ML
INJECTION, SOLUTION INTRAVENOUS CONTINUOUS
Status: DISCONTINUED | OUTPATIENT
Start: 2020-12-02 | End: 2020-12-06

## 2020-12-02 RX ORDER — 0.9 % SODIUM CHLORIDE 0.9 %
500 INTRAVENOUS SOLUTION INTRAVENOUS ONCE
Status: COMPLETED | OUTPATIENT
Start: 2020-12-02 | End: 2020-12-02

## 2020-12-02 RX ORDER — ACETAMINOPHEN 160 MG/5ML
1000 SOLUTION ORAL EVERY 8 HOURS SCHEDULED
Status: DISCONTINUED | OUTPATIENT
Start: 2020-12-02 | End: 2020-12-11 | Stop reason: HOSPADM

## 2020-12-02 RX ORDER — PROMETHAZINE HYDROCHLORIDE 12.5 MG/1
12.5 TABLET ORAL EVERY 6 HOURS PRN
Status: DISCONTINUED | OUTPATIENT
Start: 2020-12-02 | End: 2020-12-02

## 2020-12-02 RX ORDER — SODIUM CHLORIDE, SODIUM LACTATE, POTASSIUM CHLORIDE, CALCIUM CHLORIDE 600; 310; 30; 20 MG/100ML; MG/100ML; MG/100ML; MG/100ML
INJECTION, SOLUTION INTRAVENOUS CONTINUOUS PRN
Status: DISCONTINUED | OUTPATIENT
Start: 2020-12-01 | End: 2020-12-02 | Stop reason: SDUPTHER

## 2020-12-02 RX ORDER — LEVETIRACETAM 100 MG/ML
1000 SOLUTION ORAL 2 TIMES DAILY
Status: COMPLETED | OUTPATIENT
Start: 2020-12-02 | End: 2020-12-09

## 2020-12-02 RX ORDER — FENTANYL CITRATE 50 UG/ML
25 INJECTION, SOLUTION INTRAMUSCULAR; INTRAVENOUS
Status: DISCONTINUED | OUTPATIENT
Start: 2020-12-02 | End: 2020-12-06

## 2020-12-02 RX ORDER — PROPOFOL 10 MG/ML
INJECTION, EMULSION INTRAVENOUS CONTINUOUS PRN
Status: DISCONTINUED | OUTPATIENT
Start: 2020-12-02 | End: 2020-12-02 | Stop reason: SDUPTHER

## 2020-12-02 RX ORDER — DEXTROSE MONOHYDRATE 50 MG/ML
100 INJECTION, SOLUTION INTRAVENOUS PRN
Status: DISCONTINUED | OUTPATIENT
Start: 2020-12-02 | End: 2020-12-03

## 2020-12-02 RX ORDER — POLYETHYLENE GLYCOL 3350 17 G/17G
17 POWDER, FOR SOLUTION ORAL DAILY
Status: DISCONTINUED | OUTPATIENT
Start: 2020-12-02 | End: 2020-12-09

## 2020-12-02 RX ORDER — CHLORHEXIDINE GLUCONATE 0.12 MG/ML
15 RINSE ORAL 2 TIMES DAILY
Status: DISCONTINUED | OUTPATIENT
Start: 2020-12-02 | End: 2020-12-11 | Stop reason: HOSPADM

## 2020-12-02 RX ORDER — MAGNESIUM SULFATE IN WATER 40 MG/ML
2 INJECTION, SOLUTION INTRAVENOUS ONCE
Status: COMPLETED | OUTPATIENT
Start: 2020-12-02 | End: 2020-12-02

## 2020-12-02 RX ORDER — OXYCODONE HYDROCHLORIDE 5 MG/1
5 TABLET ORAL EVERY 6 HOURS
Status: DISCONTINUED | OUTPATIENT
Start: 2020-12-02 | End: 2020-12-02

## 2020-12-02 RX ORDER — SODIUM CHLORIDE 0.9 % (FLUSH) 0.9 %
10 SYRINGE (ML) INJECTION EVERY 12 HOURS SCHEDULED
Status: DISCONTINUED | OUTPATIENT
Start: 2020-12-02 | End: 2020-12-11 | Stop reason: HOSPADM

## 2020-12-02 RX ORDER — PROPOFOL 10 MG/ML
INJECTION, EMULSION INTRAVENOUS
Status: COMPLETED
Start: 2020-12-02 | End: 2020-12-02

## 2020-12-02 RX ORDER — ONDANSETRON 2 MG/ML
4 INJECTION INTRAMUSCULAR; INTRAVENOUS EVERY 6 HOURS PRN
Status: DISCONTINUED | OUTPATIENT
Start: 2020-12-02 | End: 2020-12-07

## 2020-12-02 RX ORDER — METHOCARBAMOL 750 MG/1
750 TABLET, FILM COATED ORAL EVERY 8 HOURS
Status: DISCONTINUED | OUTPATIENT
Start: 2020-12-02 | End: 2020-12-09

## 2020-12-02 RX ORDER — LEVETIRACETAM 10 MG/ML
1000 INJECTION INTRAVASCULAR ONCE
Status: COMPLETED | OUTPATIENT
Start: 2020-12-02 | End: 2020-12-02

## 2020-12-02 RX ORDER — SENNA AND DOCUSATE SODIUM 50; 8.6 MG/1; MG/1
1 TABLET, FILM COATED ORAL 2 TIMES DAILY
Status: DISCONTINUED | OUTPATIENT
Start: 2020-12-02 | End: 2020-12-10

## 2020-12-02 RX ORDER — ACETAMINOPHEN 500 MG
1000 TABLET ORAL EVERY 8 HOURS SCHEDULED
Status: DISCONTINUED | OUTPATIENT
Start: 2020-12-02 | End: 2020-12-02

## 2020-12-02 RX ORDER — NICOTINE POLACRILEX 4 MG
15 LOZENGE BUCCAL PRN
Status: DISCONTINUED | OUTPATIENT
Start: 2020-12-02 | End: 2020-12-03

## 2020-12-02 RX ORDER — MAGNESIUM HYDROXIDE 1200 MG/15ML
LIQUID ORAL CONTINUOUS PRN
Status: COMPLETED | OUTPATIENT
Start: 2020-12-02 | End: 2020-12-02

## 2020-12-02 RX ORDER — OXYCODONE HCL 5 MG/5 ML
5 SOLUTION, ORAL ORAL EVERY 6 HOURS
Status: DISCONTINUED | OUTPATIENT
Start: 2020-12-02 | End: 2020-12-02

## 2020-12-02 RX ORDER — SODIUM CHLORIDE 0.9 % (FLUSH) 0.9 %
10 SYRINGE (ML) INJECTION PRN
Status: DISCONTINUED | OUTPATIENT
Start: 2020-12-02 | End: 2020-12-11 | Stop reason: HOSPADM

## 2020-12-02 RX ORDER — NOREPINEPHRINE BIT/0.9 % NACL 16MG/250ML
2 INFUSION BOTTLE (ML) INTRAVENOUS CONTINUOUS
Status: DISCONTINUED | OUTPATIENT
Start: 2020-12-02 | End: 2020-12-08

## 2020-12-02 RX ORDER — LEVETIRACETAM 5 MG/ML
500 INJECTION INTRAVASCULAR ONCE
Status: COMPLETED | OUTPATIENT
Start: 2020-12-02 | End: 2020-12-02

## 2020-12-02 RX ORDER — PROPOFOL 10 MG/ML
10 INJECTION, EMULSION INTRAVENOUS CONTINUOUS
Status: DISCONTINUED | OUTPATIENT
Start: 2020-12-02 | End: 2020-12-02

## 2020-12-02 RX ORDER — BISACODYL 10 MG
10 SUPPOSITORY, RECTAL RECTAL DAILY PRN
Status: DISCONTINUED | OUTPATIENT
Start: 2020-12-02 | End: 2020-12-09

## 2020-12-02 RX ORDER — MIDAZOLAM HYDROCHLORIDE 1 MG/ML
INJECTION INTRAMUSCULAR; INTRAVENOUS
Status: COMPLETED
Start: 2020-12-02 | End: 2020-12-02

## 2020-12-02 RX ORDER — DEXTROSE MONOHYDRATE 25 G/50ML
12.5 INJECTION, SOLUTION INTRAVENOUS PRN
Status: DISCONTINUED | OUTPATIENT
Start: 2020-12-02 | End: 2020-12-03

## 2020-12-02 RX ADMIN — SODIUM CHLORIDE, PRESERVATIVE FREE 10 ML: 5 INJECTION INTRAVENOUS at 20:53

## 2020-12-02 RX ADMIN — ROCURONIUM BROMIDE 20 MG: 10 INJECTION, SOLUTION INTRAVENOUS at 01:23

## 2020-12-02 RX ADMIN — DOCUSATE SODIUM 50MG AND SENNOSIDES 8.6MG 1 TABLET: 8.6; 5 TABLET, FILM COATED ORAL at 08:12

## 2020-12-02 RX ADMIN — SODIUM CHLORIDE 3 G: 900 INJECTION INTRAVENOUS at 11:54

## 2020-12-02 RX ADMIN — DEXTROSE MONOHYDRATE 2 G: 50 INJECTION, SOLUTION INTRAVENOUS at 08:19

## 2020-12-02 RX ADMIN — PROPOFOL 20 MCG/KG/MIN: 10 INJECTION, EMULSION INTRAVENOUS at 03:50

## 2020-12-02 RX ADMIN — PROPOFOL 35 MCG/KG/MIN: 10 INJECTION, EMULSION INTRAVENOUS at 18:17

## 2020-12-02 RX ADMIN — ROCURONIUM BROMIDE 50 MG: 10 INJECTION, SOLUTION INTRAVENOUS at 03:04

## 2020-12-02 RX ADMIN — METHOCARBAMOL TABLETS 750 MG: 750 TABLET, COATED ORAL at 11:43

## 2020-12-02 RX ADMIN — ACETAMINOPHEN 1000 MG: 650 SOLUTION ORAL at 20:50

## 2020-12-02 RX ADMIN — LEVETIRACETAM 1000 MG: 10 INJECTION INTRAVENOUS at 08:58

## 2020-12-02 RX ADMIN — PHENYLEPHRINE HYDROCHLORIDE 100 MCG: 10 INJECTION INTRAVENOUS at 02:29

## 2020-12-02 RX ADMIN — Medication 200 MCG/HR: at 10:24

## 2020-12-02 RX ADMIN — FAMOTIDINE 20 MG: 10 INJECTION INTRAVENOUS at 08:12

## 2020-12-02 RX ADMIN — PROPOFOL 20 MCG/KG/MIN: 10 INJECTION, EMULSION INTRAVENOUS at 03:13

## 2020-12-02 RX ADMIN — SODIUM CHLORIDE 500 ML: 9 INJECTION, SOLUTION INTRAVENOUS at 03:57

## 2020-12-02 RX ADMIN — OXYCODONE HYDROCHLORIDE 5 MG: 5 TABLET ORAL at 08:28

## 2020-12-02 RX ADMIN — FAMOTIDINE 20 MG: 10 INJECTION INTRAVENOUS at 20:51

## 2020-12-02 RX ADMIN — ACETAMINOPHEN 1000 MG: 500 TABLET ORAL at 08:12

## 2020-12-02 RX ADMIN — METHOCARBAMOL TABLETS 750 MG: 750 TABLET, COATED ORAL at 04:51

## 2020-12-02 RX ADMIN — Medication 50 MCG/HR: at 04:42

## 2020-12-02 RX ADMIN — ENOXAPARIN SODIUM 30 MG: 30 INJECTION SUBCUTANEOUS at 20:51

## 2020-12-02 RX ADMIN — PROPOFOL 25 MCG/KG/MIN: 10 INJECTION, EMULSION INTRAVENOUS at 06:10

## 2020-12-02 RX ADMIN — IOPAMIDOL 12 ML: 612 INJECTION, SOLUTION INTRATHECAL at 15:44

## 2020-12-02 RX ADMIN — LEVETIRACETAM 1000 MG: 100 SOLUTION ORAL at 20:51

## 2020-12-02 RX ADMIN — Medication 200 MCG/HR: at 20:05

## 2020-12-02 RX ADMIN — SODIUM CHLORIDE 3 G: 900 INJECTION INTRAVENOUS at 18:08

## 2020-12-02 RX ADMIN — ROCURONIUM BROMIDE 50 MG: 10 INJECTION, SOLUTION INTRAVENOUS at 00:47

## 2020-12-02 RX ADMIN — DOCUSATE SODIUM 50MG AND SENNOSIDES 8.6MG 1 TABLET: 8.6; 5 TABLET, FILM COATED ORAL at 20:53

## 2020-12-02 RX ADMIN — SODIUM CHLORIDE 3 G: 900 INJECTION INTRAVENOUS at 20:52

## 2020-12-02 RX ADMIN — Medication 6 MCG/MIN: at 04:18

## 2020-12-02 RX ADMIN — PHENYLEPHRINE HYDROCHLORIDE 100 MCG: 10 INJECTION INTRAVENOUS at 02:30

## 2020-12-02 RX ADMIN — OXYCODONE HYDROCHLORIDE 5 MG: 5 TABLET ORAL at 04:21

## 2020-12-02 RX ADMIN — FENTANYL CITRATE 50 MCG: 50 INJECTION, SOLUTION INTRAMUSCULAR; INTRAVENOUS at 03:04

## 2020-12-02 RX ADMIN — POLYETHYLENE GLYCOL 3350 17 G: 17 POWDER, FOR SOLUTION ORAL at 08:28

## 2020-12-02 RX ADMIN — MIDAZOLAM HYDROCHLORIDE 2 MG: 2 INJECTION, SOLUTION INTRAMUSCULAR; INTRAVENOUS at 08:58

## 2020-12-02 RX ADMIN — ACETAMINOPHEN 1000 MG: 650 SOLUTION ORAL at 16:27

## 2020-12-02 RX ADMIN — METHOCARBAMOL TABLETS 750 MG: 750 TABLET, COATED ORAL at 20:51

## 2020-12-02 RX ADMIN — MAGNESIUM SULFATE 2 G: 2 INJECTION INTRAVENOUS at 09:07

## 2020-12-02 RX ADMIN — SODIUM CHLORIDE: 9 INJECTION, SOLUTION INTRAVENOUS at 03:55

## 2020-12-02 RX ADMIN — SODIUM CHLORIDE, PRESERVATIVE FREE 10 ML: 5 INJECTION INTRAVENOUS at 08:28

## 2020-12-02 RX ADMIN — MIDAZOLAM HYDROCHLORIDE 2 MG: 1 INJECTION, SOLUTION INTRAMUSCULAR; INTRAVENOUS at 08:58

## 2020-12-02 RX ADMIN — PHENYLEPHRINE HYDROCHLORIDE 50 MCG: 10 INJECTION INTRAVENOUS at 01:23

## 2020-12-02 RX ADMIN — Medication 14 MCG/MIN: at 18:36

## 2020-12-02 ASSESSMENT — PULMONARY FUNCTION TESTS
PIF_VALUE: 16
PIF_VALUE: 17
PIF_VALUE: 18
PIF_VALUE: 19
PIF_VALUE: 20
PIF_VALUE: 16
PIF_VALUE: 18
PIF_VALUE: 16
PIF_VALUE: 17
PIF_VALUE: 16
PIF_VALUE: 17
PIF_VALUE: 18
PIF_VALUE: 17
PIF_VALUE: 16
PIF_VALUE: 16
PIF_VALUE: 18
PIF_VALUE: 17
PIF_VALUE: 18
PIF_VALUE: 18
PIF_VALUE: 16
PIF_VALUE: 16
PIF_VALUE: 17
PIF_VALUE: 22
PIF_VALUE: 16
PIF_VALUE: 17
PIF_VALUE: 16
PIF_VALUE: 18
PIF_VALUE: 15
PIF_VALUE: 18
PIF_VALUE: 16
PIF_VALUE: 17
PIF_VALUE: 15
PIF_VALUE: 18
PIF_VALUE: 17
PIF_VALUE: 17
PIF_VALUE: 16
PIF_VALUE: 18
PIF_VALUE: 18
PIF_VALUE: 16
PIF_VALUE: 18
PIF_VALUE: 17
PIF_VALUE: 16
PIF_VALUE: 16
PIF_VALUE: 10
PIF_VALUE: 16
PIF_VALUE: 18
PIF_VALUE: 16
PIF_VALUE: 16
PIF_VALUE: 17
PIF_VALUE: 16
PIF_VALUE: 17
PIF_VALUE: 16
PIF_VALUE: 18
PIF_VALUE: 17
PIF_VALUE: 16
PIF_VALUE: 18
PIF_VALUE: 17
PIF_VALUE: 18
PIF_VALUE: 16
PIF_VALUE: 18
PIF_VALUE: 17
PIF_VALUE: 17
PIF_VALUE: 16
PIF_VALUE: 19
PIF_VALUE: 16
PIF_VALUE: 18
PIF_VALUE: 16
PIF_VALUE: 17
PIF_VALUE: 19
PIF_VALUE: 16
PIF_VALUE: 16
PIF_VALUE: 17
PIF_VALUE: 18
PIF_VALUE: 18
PIF_VALUE: 16
PIF_VALUE: 19
PIF_VALUE: 17
PIF_VALUE: 17
PIF_VALUE: 15
PIF_VALUE: 16
PIF_VALUE: 17
PIF_VALUE: 18
PIF_VALUE: 19
PIF_VALUE: 16
PIF_VALUE: 16
PIF_VALUE: 17
PIF_VALUE: 17
PIF_VALUE: 16
PIF_VALUE: 18
PIF_VALUE: 19
PIF_VALUE: 18
PIF_VALUE: 15
PIF_VALUE: 18
PIF_VALUE: 16
PIF_VALUE: 16
PIF_VALUE: 18
PIF_VALUE: 17
PIF_VALUE: 18
PIF_VALUE: 16
PIF_VALUE: 19
PIF_VALUE: 18
PIF_VALUE: 17
PIF_VALUE: 18
PIF_VALUE: 16
PIF_VALUE: 16
PIF_VALUE: 18
PIF_VALUE: 18
PIF_VALUE: 16
PIF_VALUE: 18
PIF_VALUE: 17
PIF_VALUE: 8
PIF_VALUE: 16
PIF_VALUE: 17
PIF_VALUE: 25
PIF_VALUE: 18
PIF_VALUE: 16
PIF_VALUE: 17
PIF_VALUE: 17
PIF_VALUE: 7
PIF_VALUE: 16
PIF_VALUE: 17
PIF_VALUE: 16
PIF_VALUE: 17
PIF_VALUE: 16
PIF_VALUE: 18
PIF_VALUE: 18
PIF_VALUE: 15
PIF_VALUE: 18
PIF_VALUE: 17
PIF_VALUE: 18
PIF_VALUE: 42
PIF_VALUE: 18
PIF_VALUE: 16
PIF_VALUE: 18
PIF_VALUE: 18
PIF_VALUE: 17
PIF_VALUE: 18
PIF_VALUE: 17
PIF_VALUE: 16
PIF_VALUE: 18
PIF_VALUE: 16
PIF_VALUE: 16
PIF_VALUE: 17
PIF_VALUE: 16
PIF_VALUE: 19
PIF_VALUE: 19
PIF_VALUE: 18
PIF_VALUE: 16
PIF_VALUE: 17
PIF_VALUE: 16
PIF_VALUE: 16
PIF_VALUE: 21
PIF_VALUE: 16
PIF_VALUE: 16
PIF_VALUE: 18
PIF_VALUE: 18
PIF_VALUE: 16
PIF_VALUE: 17
PIF_VALUE: 16
PIF_VALUE: 18
PIF_VALUE: 17
PIF_VALUE: 18
PIF_VALUE: 19
PIF_VALUE: 18
PIF_VALUE: 18
PIF_VALUE: 16
PIF_VALUE: 17
PIF_VALUE: 17
PIF_VALUE: 16
PIF_VALUE: 18
PIF_VALUE: 18
PIF_VALUE: 22
PIF_VALUE: 18
PIF_VALUE: 16
PIF_VALUE: 19

## 2020-12-02 ASSESSMENT — PAIN SCALES - GENERAL: PAINLEVEL_OUTOF10: 10
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
## 2020-12-02 NOTE — ANESTHESIA PRE PROCEDURE
Department of Anesthesiology  Preprocedure Note       Name:  Niranjan Carter   Age:  23 y.o.  :  2000                                          MRN:  [id number]         Date:  2020      Surgeon: Walker Savage): Roxann Lopez MD    Procedure: Procedure(s):  MANDIBLE  OPEN REDUCTION INTERNAL FIXATION, RIGHT ORIF ORBITAL FRACTURE - HYBRID IMF, LEIBINGER PLATING SYSTEM    Medications prior to admission:   Prior to Admission medications    Not on File       Current medications:    No current facility-administered medications for this visit. No current outpatient medications on file.      Facility-Administered Medications Ordered in Other Visits   Medication Dose Route Frequency Provider Last Rate Last Dose    sodium chloride flush 0.9 % injection 10 mL  10 mL Intravenous 2 times per day Ana J Imel, DO   10 mL at 20 0828    sodium chloride flush 0.9 % injection 10 mL  10 mL Intravenous PRN Ana DESHPANDE Imel, DO        ondansetron (ZOFRAN) injection 4 mg  4 mg Intravenous Q6H PRN Ana DESHPANDE Imel, DO        glucose (GLUTOSE) 40 % oral gel 15 g  15 g Oral PRN Ana DESHPANDE Imel, DO        dextrose 50 % IV solution  12.5 g Intravenous PRN Ana DESHPANDE Imel, DO        glucagon (rDNA) injection 1 mg  1 mg Intramuscular PRN Ana J Imel, DO        dextrose 5 % solution  100 mL/hr Intravenous PRN Ana DESHPANDE Imel, DO        chlorhexidine (PERIDEX) 0.12 % solution 15 mL  15 mL Mouth/Throat BID Ana J Imel, DO        famotidine (PEPCID) injection 20 mg  20 mg Intravenous BID Ana J Imel, DO   20 mg at 20 0812    0.9 % sodium chloride infusion   Intravenous Continuous Ana J Imel,  mL/hr at 20 0355      polyethylene glycol (GLYCOLAX) packet 17 g  17 g Oral Daily Ana J Imel, DO   17 g at 20 0828    sennosides-docusate sodium (SENOKOT-S) 8.6-50 MG tablet 1 tablet  1 tablet Oral BID Ana J Imel, DO   1 tablet at 20 0812    bisacodyl (DULCOLAX) suppository 10 mg  10 mg Rectal Daily PRN reviewed no history of anesthetic complications:   Airway: Mallampati: Unable to assess / NA     Neck ROM: limited  Comment: INTUBATED     Dental:          Pulmonary:Negative Pulmonary ROS and normal exam                               Cardiovascular:Negative CV ROS            Rhythm: regular  Rate: normal                    Neuro/Psych:   Negative Neuro/Psych ROS              GI/Hepatic/Renal: Neg GI/Hepatic/Renal ROS            Endo/Other: Negative Endo/Other ROS                    Abdominal:           Vascular: negative vascular ROS. Anesthesia Plan      general     ASA 4       Induction: intravenous. MIPS: Postoperative opioids intended and Prophylactic antiemetics administered. Anesthetic plan and risks discussed with Unable to obtain due to emergent nature and patient. Use of blood products discussed with patient whom consented to blood products. Plan discussed with CRNA.     Attending anesthesiologist reviewed and agrees with Pre Eval content              VIKKI Mendoza - CRNA   12/2/2020

## 2020-12-02 NOTE — PROGRESS NOTES
Called patient's sister, Nat Smith regarding POA for patient. She states that patient does not have POA paperwork that she is aware of. He is not . She states patient's father raised them but works a lot and may not always be available to answer calls. Informed Nat Smith that patient's father is legally his decision maker at this time since patient is intubated and sedated and unable to provide his own consent. Nat Smith verbalized understanding. Patient's father is: Pooja Babcock.     Electronically signed by VIKKI Nava CNP on 12/2/2020 at 2:15 PM
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
## 2020-12-02 NOTE — PLAN OF CARE
Attempted to call patient father, Pooja Babcock, to notify him of CT myelogram but there was no answer.

## 2020-12-02 NOTE — FLOWSHEET NOTE
MYELOGRAM ; CERVICAL    DR. Selvin CAGLE RT  MANINDER RT  REHAN WANG RESP      PATIENT TO IR WITH ICU TEAM, IDENTIFIED, ALLERGIES CONFIRMED. MONITORS ON. PRONE. STABLE. /68 ART  HR 66  SATS 94 VENT      TIME OUT COMPLETE. PREP TO LUMBAR REGION PER DR. Selvin Graham    12ML OF ISOVIEW M 300 INSTILLED PER DR. Darien Saeed. FILMS PERFORMED. BANDAGE TO LUMBAR REGION PUNCTURE SITE. NO PROBLEMS NOTED. /62  HR 65  SATS 98 VENT      REPORT AND CARE BACK TO REHAN GEORGES. PATIENT STABLE, REMAINS ON MONITOR, BACK TO BED AND TRANSPORT TO CT FOR  CONTINUED TEST.

## 2020-12-02 NOTE — PROCEDURES
PROCEDURE NOTE - CENTRAL VENOUS LINE PLACEMENT    PATIENT NAME: Tomás Trauma Xxumang  MEDICAL RECORD NO. [id number]  DATE: 12/1/2020  SURGEON: Suhba Duval  PRIMARY CARE PHYSICIAN: No primary care provider on file. PREOPERATIVE DIAGNOSIS:  Need for IV access  POSTOPERATIVE DIAGNOSIS:  Same  PROCEDURE PERFORMED:  Right Femoral Vein Central Line Insertion  SURGEON: Beatriz  ANESTHESIA:  Local utilizing 1% lidocaine  ESTIMATED BLOOD LOSS:  Less than 25 ml  COMPLICATIONS:  None immediately appreciated. OPERATIVE NOTE PREPARED BY: Maurice Gu     DISCUSSION:  Tomás Trauma Kevin Alford is a 23y.o.-year-old male who requires additional IV access and central pressure monitoring. The history and physical examination were reviewed and confirmed. The diagnoses, proposed procedure, risks, possible complications, benefits and alternatives were discussed with the patient or family. He was given the opportunity to ask questions, and once answered, informed consent was obtained. The patient was then prepared for the procedure. PROCEDURE:  A timeout was initiated by the bedside nurse and was confirmed by those present. The patient was placed in a supine position. The skin overlying the Right Femoral Vein was prepped with chlorhexadine and draped in sterile fashion. The skin was infiltrated with local anesthetic. Through the anesthetized region, the introducer needle was inserted into the femoral vein using ultrasound guidance with returning dark red non pulsatile blood. A guidewire was placed through the center of the needle with no resistance. A small incision made in the skin with a #11 scalpel blade. The dilator was inserted into the skin and vein over guidewire using Seldinger technique. The dilator was then removed and the catheter was placed in the vein over the guidewire using Seldinger technique. The guidewire was then removed and all ports aspirated and flushed appropriately.  The catheter then secured using silk suture and a temporary sterile dressing was applied. No immediate complication was evident. All sponge, instrument and needle counts were correct at the completion of the procedure. Postprocedural chest x-ray showed good position of the catheter with no evidence of hemothorax or pneumothorax. The patient tolerated the procedure well with no immediate complication evident.      Nanette Glynn MD  12/1/20, 11:03 PM

## 2020-12-02 NOTE — PROGRESS NOTES
The transport originated from TICU. Pt. was transported to IR then to CT SCAN. Assisting with the transport was RN. Appropriate devices were applied to monitor the patient's condition during transport. Patient transported  via 30% O2 via ventilator. Patient tolerated well.         Marielos Rao  4:05 PM
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
## 2020-12-02 NOTE — PROGRESS NOTES
Occupational Therapy Not Seen Note    DATE: 2020  Name: Fabien Denton  : 2000  MRN: [id number]    Patient not available for Occupational Therapy due to:     Other: pt intubated and sedated, not appropriate for OT Eval at this time     Next Scheduled Treatment: check back 12/3/2020    Electronically signed by AMY Abernathy on 2020 at 8:40 AM
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
## 2020-12-02 NOTE — FLOWSHEET NOTE
12/02/20 1330   Provider Notification   Reason for Communication Evaluate; Review case   Provider Name Dr. Johanna Merchant   Provider Notification Physician   Method of Communication Face to face   Response At bedside   Notification Time [phone]

## 2020-12-02 NOTE — PROGRESS NOTES
[address] [facility]  Speech Language Pathology    Date: 12/2/2020  Patient Name: Jewel Schmidt  YOB: 2000   AGE: 23 y.o. MRN: [id number]        Patient Not Available for Speech Therapy     Due to:  [] Testing  [] Hemodialysis  [] Cancelled by RN  [] Surgery   [x] Intubation/Sedation/Pain Medication  [] Medical instability  [] Other:    Next scheduled treatment: as medically appropriate   Completed by:  BANDAR Escalera
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
## 2020-12-02 NOTE — ANESTHESIA PRE PROCEDURE
Department of Anesthesiology  Preprocedure Note       Name:  Nancy Villanueva   Age:  23 y.o.  :  2000                                          MRN:  [id number]         Date:  2020      Surgeon: Dexter Barroso):  Bonilla Glasgow DO    Procedure: Procedure(s):  C7, CORPECTOMY, C-ARM, SUPINE,INTUBATED, MICROSCOPE    Medications prior to admission:   Prior to Admission medications    Not on File       Current medications:    Current Facility-Administered Medications   Medication Dose Route Frequency Provider Last Rate Last Dose    norepinephrine-sodium chloride (LEVOPHED) 16-0.9 MG/250ML-% infusion             EPINEPHrine 1 MG/10ML injection             fentaNYL 20 mcg/mL PCA             EPINEPHrine 1 MG/10ML injection             0.9 % sodium chloride bolus  20 mL Intravenous Once Shantal I Linden, DO        propofol 1000 MG/100ML injection              No current outpatient medications on file. Allergies:  No Known Allergies    Problem List:  There is no problem list on file for this patient. Past Medical History:  No past medical history on file. Past Surgical History:  No past surgical history on file.     Social History:    Social History     Tobacco Use    Smoking status: Not on file   Substance Use Topics    Alcohol use: Not on file                                Counseling given: Not Answered      Vital Signs (Current):   Vitals:    20 2144 20 2153   Pulse: 97    Resp: 14 26   SpO2: 100% 99%                                              BP Readings from Last 3 Encounters:   No data found for BP       NPO Status:                                                                                 BMI:   Wt Readings from Last 3 Encounters:   No data found for Wt     There is no height or weight on file to calculate BMI.    CBC:   Lab Results   Component Value Date    WBC 12.9 2020    RBC 4.02 2020    HGB 12.5 2020    HCT 37.4 2020    MCV 93.0 2020 RDW 13.3 12/01/2020     12/01/2020       CMP:   Lab Results   Component Value Date     12/01/2020    K 3.2 12/01/2020     12/01/2020    CO2 22 12/01/2020    BUN 13 12/01/2020    CREATININE 0.78 12/01/2020    GFRAA >60 12/01/2020    LABGLOM >60 12/01/2020    GLUCOSE 123 12/01/2020       POC Tests: No results for input(s): POCGLU, POCNA, POCK, POCCL, POCBUN, POCHEMO, POCHCT in the last 72 hours. Coags:   Lab Results   Component Value Date    PROTIME 11.8 12/01/2020    INR 1.1 12/01/2020    APTT 21.0 12/01/2020       HCG (If Applicable): No results found for: PREGTESTUR, PREGSERUM, HCG, HCGQUANT     ABGs: No results found for: PHART, PO2ART, JNH6UFC, PUV3GKL, BEART, S3FPJEEX     Type & Screen (If Applicable):  No results found for: LABABO, LABRH    Drug/Infectious Status (If Applicable):  No results found for: HIV, HEPCAB    COVID-19 Screening (If Applicable):   Lab Results   Component Value Date    COVID19 Not Detected 12/01/2020         Anesthesia Evaluation   no history of anesthetic complications:   Airway: Mallampati: Unable to assess / NA        Dental:          Pulmonary:Negative Pulmonary ROS and normal exam                               Cardiovascular:Negative CV ROS            Rhythm: regular  Rate: normal                    Neuro/Psych:   Negative Neuro/Psych ROS              GI/Hepatic/Renal: Neg GI/Hepatic/Renal ROS            Endo/Other: Negative Endo/Other ROS                    Abdominal:           Vascular: negative vascular ROS. Anesthesia Plan      general     ASA 4 - emergent       Induction: intravenous. arterial line    Anesthetic plan and risks discussed with Unable to obtain due to emergent nature. Plan discussed with CRNA.                   Sheila Segovia MD   12/1/2020

## 2020-12-02 NOTE — FLOWSHEET NOTE
12/02/20 1345   Provider Notification   Reason for Communication Evaluate; Review case   Provider Name Dr. Fredrick Salazar   Provider Notification Physician   Method of Communication Face to face   Response At bedside   Notification Time [phone] [phone]

## 2020-12-02 NOTE — PROGRESS NOTES
Dr. Rachel Arauz at bedside (retina specialist). He states that patient's vision cannot be salvaged. States patient does needs surgery with oculoplastics to this eye. Examined left eye. Dr. Rachel Arauz does not have epic sign on so he wrote a note on paper to be scanned into chart. Review of note from Dr. 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 Continue abx for infection prophylaxis. Dr. Rachel Arauz spoke to Dr. Brennen Joseph on the phone prior to leaving the unit. Decision made to wait for a week or two to operate on the eye. Dr. Brennen Joseph at bedside. States there is no concern with the hematoma as patient's pulses are palpable. States that when the hematoma will disperse in time. Dr. Aguilar Brothers at bedside. States patient does not need EEG at this time.   States they will order for CT myelogram.    Electronically signed by VIKKI Goff CNP on 12/2/2020 at 1:56 PM

## 2020-12-02 NOTE — OP NOTE
Bloody 12/02/20 1600   Status To bulb suction 12/02/20 0800   Output (ml) 10 ml 12/02/20 1000       NG/OG/NJ/NE Tube Orogastric (Active)   Surrounding Skin Dry; Intact 12/02/20 1600   Status Clamped 12/02/20 1600   Placement Verified by External Catheter Length 12/02/20 1600   NG/OG/NJ/NE External Measurement (cm) 69 cm 12/02/20 1600       Urethral Catheter (Active)   Catheter Indications Need for fluid management in critically ill patients in a critical care setting not able to be managed by other means such as BSC with hat, bedpan, urinal, condom catheter, or short term intermittent urethral catherization 12/02/20 1600   Securement Device Date Changed 12/05/20 12/02/20 0400   Site Assessment No urethral drainage 12/02/20 1600   Urine Color Yellow 12/02/20 1600   Urine Appearance Clear 12/02/20 1600   Output (mL) 100 mL 12/02/20 1400       Lumbar Drain (Active)   Drain Status Open 12/02/20 0400   CSF Color Other (Comment) 12/02/20 1600   Site Description Unable to view 12/02/20 1600   Dressing Status Clean;Dry; Intact 12/02/20 1600   Output (ml) 10 ml 12/02/20 1600       Lumbar Drain (Active)       Findings: Traumatic durotomy left lateral recess anteriorly. Detailed Description of Procedure:   Patient was consented preoperatively brought into the operative room and placed under anesthesia. He was placed supine on the regular to the shoulder bump. Arms were tucked at the sides and secured with tape. Shoulders were taped caudad. Intracervical region was shaved and prepped in sterile fashion after midline and right paraspinal neck creases were marked out. After sterile prepping draping and timeout was performed fluoroscopy was used to lawson out the split C7 body. Incision was infiltrated with 1% lidocaine with epinephrine followed by 10 blade to perform incision followed by Bovie to take down the subcutaneous tissue and placement of a self retainer.   Metzenbaums and pickups were used to undermine the platysma which was taken down in line with the skin incision using Bovie cautery. Self-retaining was then advanced. Blunt and sharp dissection using Metzenbaum scissors pickups and finger dissection were used to clear the plane medial to the SCM and the omohyoid. Complete release of all fascial membranes and the carotid sheath was identified and swept laterally. Recurrent laryngeal nerve was identified and dissected out. Esophagus was again identified and clearly delineated. Per the request of trauma surgeon Dr. Julianna Kanner, he was called into the room at this point to inspect the esophagus following an EGD that was performed at the beginning of the case. His direct inspection noted no apparent injuries to the esophagus. Case was then handed back over to myself. Assistant retract the esophagus and medial structures using suction Bovie I undermined the longus coli bilaterally and cleared off the mid C6 to mid T1 vertebral bodies. Cisneros retractor was placed medial lateral and cephalad caudad. 15 blade was used to perform annulotomy that C 71 and C6-7.  4 curette and 3 punch were used to remove the bulk of the discectomy and perform anterolisthesis at inferior C6 and superior T1. After completion of the discectomy operative microscope was then brought into the field. Under microscopic guidance I drilled down the posterior osteophytes at C6-7 and C7-T1. Large chunks of C7 vertebral body were then removed using a rongeur. The vertebral body was completely comminuted fragments were easily removed. There was a significant bout of instability noted. Drill was then used to drill down the posterior aspect of the C7 body. 2 curette was used to penetrate the PLL. 2 punch was used to resect the posterior margin along the PLL and undermining both infra C6 and supra T1. Complete circumferential decompression from C6-T1 and bilateral was noted.   I did note a traumatic durotomy at the left inferior portion of the ventral dura just above the T1 endplate on the left side. Clear CSF egress was noted. This was packed using a carolee. Epidural bleeders were controlled using a combination of bipolar cautery and FloSeal.  Arthrodesis of the inferior C6 and superior T1 endplates was performed. Holt pin was placed into the C6 body and leverage to allow for implantation and sizing of an appropriate corpectomy cage. 22 mm height lordotic DePuy corpectomy cage was then filled with morselized autograft from the C7 vertebral body and implanted under direct fluoroscopic guidance. It was slightly countersunk. Anterior margin was drilled down flat and a 22 mm plate was then placed along with 16mm screws that were convergent and away from the respective endplates at C6 and T1. All screws were then final tightened. Final AP and lateral fluoroscopy showed complete alignment as well as good placement of all hardware. Wound was thoroughly irrigated. Of note prior to implantation of the cage I did place a DuraMatrix onlay directly over the leak site as well as a small amount of DuraSeal.  Wound was thoroughly irrigated and hemostasis was obtained with bipolar cautery. FloSeal Gelfoam were also packed and then removed after irrigation. 7 flat AMI drain was tunneled subplatysmal and secured with a drain stitch. Wound was closed in 2 layers using multiple 3-0 Vicryl interrupteds for the platysma followed by 4-0 Monocryl subcuticular runner and Dermabond. Drain stitch was also placed. Patient was then turned left lateral decubitus. Based on palpation of the L3-4 level sterile prepping and draping of the lumbar region was performed. A Touhy needle was advanced in the paramedian approach at approximate 45 degrees cephalad and medialized. Clear CSF egress was noted after penetration of the ligamentum flavum and dura. Lumbar drain was then advanced with a stylette to a length in accordance with the cervical spine.   2 needle was then removed along with a stylette. The drain was secured using a combination of 3-0 nylon and Tegaderm. Slightly pinkish CSF egress was noted through the lumbar drain. Lumbar drain was attached to the Vivitrol and secured. Patient was then returned to the MICU in stable condition.     Electronically signed by Fabiola Giron DO on 12/2/2020 at 5:55 PM
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
## 2020-12-02 NOTE — PROGRESS NOTES
CTA reviewed and case discussed with resident. No evidence of left subclavian extravasation however there does appear to be some compression from hematoma. Hand perfused with palpable pulses. Recommend conservative management for now.
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
## 2020-12-02 NOTE — PLAN OF CARE
Problem: Restraint Use - Nonviolent/Non-Self-Destructive Behavior:  Goal: Absence of restraint-related injury  Description: Absence of restraint-related injury  Outcome: Ongoing     Problem: Falls - Risk of:  Goal: Will remain free from falls  Description: Will remain free from falls  Outcome: Ongoing  Goal: Absence of physical injury  Description: Absence of physical injury  Outcome: Ongoing     Problem: Skin Integrity:  Goal: Will show no infection signs and symptoms  Description: Will show no infection signs and symptoms  Outcome: Ongoing  Goal: Absence of new skin breakdown  Description: Absence of new skin breakdown  Outcome: Ongoing     Problem: Anxiety/Stress:  Goal: Level of anxiety will decrease  Description: Level of anxiety will decrease  Outcome: Ongoing     Problem: Cardiac Output - Decreased:  Goal: Hemodynamic stability will improve  Description: Hemodynamic stability will improve  Outcome: Ongoing     Problem: Pain:  Goal: Pain level will decrease  Description: Pain level will decrease  Outcome: Ongoing  Goal: Recognizes and communicates pain  Description: Recognizes and communicates pain  Outcome: Ongoing  Goal: Control of acute pain  Description: Control of acute pain  Outcome: Ongoing  Goal: Control of chronic pain  Description: Control of chronic pain  Outcome: Ongoing     Problem: Tissue Perfusion - Cardiopulmonary, Altered:  Goal: Hemodynamic stability will improve  Description: Hemodynamic stability will improve  Outcome: Ongoing     Problem: Restraint Use - Nonviolent/Non-Self-Destructive Behavior:  Goal: Absence of restraint indications  Description: Absence of restraint indications  Outcome: Not Met This Shift
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
## 2020-12-02 NOTE — PROGRESS NOTES
Physical Therapy  DATE: 2020    NAME: Maddy Amin  MRN: [id number]   : 2000    Patient not seen this date for Physical Therapy due to:  [] Blood transfusion in progress  [] Hemodialysis  [] Patient Declined  [] Spine Precautions   [] Strict Bedrest  [] Surgery/ Procedure  [] Testing      [x] Other: pt intubated/sedated, awaiting further POC. PT will check back 12/3/20 for evaluation appropriateness. [] PT is being discontinued at this time. Patient independent. No further needs. [] PT is being discontinued at this time due to declining physical/ medical status. Therapy is not appropriate at this time.     Antwon Duffy, PT
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
## 2020-12-03 ENCOUNTER — ANESTHESIA (OUTPATIENT)
Dept: OPERATING ROOM | Age: 20
DRG: 004 | End: 2020-12-03
Payer: MEDICAID

## 2020-12-03 ENCOUNTER — APPOINTMENT (OUTPATIENT)
Dept: GENERAL RADIOLOGY | Age: 20
DRG: 004 | End: 2020-12-03
Payer: MEDICAID

## 2020-12-03 VITALS
OXYGEN SATURATION: 100 % | RESPIRATION RATE: 16 BRPM | DIASTOLIC BLOOD PRESSURE: 68 MMHG | TEMPERATURE: 97.6 F | SYSTOLIC BLOOD PRESSURE: 126 MMHG

## 2020-12-03 LAB
ABSOLUTE EOS #: 0.03 K/UL (ref 0–0.44)
ABSOLUTE EOS #: 0.04 K/UL (ref 0–0.44)
ABSOLUTE EOS #: 0.07 K/UL (ref 0–0.44)
ABSOLUTE IMMATURE GRANULOCYTE: 0.03 K/UL (ref 0–0.3)
ABSOLUTE IMMATURE GRANULOCYTE: 0.05 K/UL (ref 0–0.3)
ABSOLUTE IMMATURE GRANULOCYTE: <0.03 K/UL (ref 0–0.3)
ABSOLUTE LYMPH #: 2.21 K/UL (ref 1.1–3.7)
ABSOLUTE LYMPH #: 2.39 K/UL (ref 1.1–3.7)
ABSOLUTE LYMPH #: 3.57 K/UL (ref 1.1–3.7)
ABSOLUTE MONO #: 0.53 K/UL (ref 0.1–1.4)
ABSOLUTE MONO #: 0.54 K/UL (ref 0.1–1.4)
ABSOLUTE MONO #: 0.64 K/UL (ref 0.1–1.4)
ALLEN TEST: ABNORMAL
ALLEN TEST: ABNORMAL
ANION GAP SERPL CALCULATED.3IONS-SCNC: 10 MMOL/L (ref 9–17)
ANION GAP SERPL CALCULATED.3IONS-SCNC: 7 MMOL/L (ref 9–17)
BASOPHILS # BLD: 0 % (ref 0–2)
BASOPHILS ABSOLUTE: 0.03 K/UL (ref 0–0.2)
BASOPHILS ABSOLUTE: <0.03 K/UL (ref 0–0.2)
BASOPHILS ABSOLUTE: <0.03 K/UL (ref 0–0.2)
BUN BLDV-MCNC: 12 MG/DL (ref 6–20)
BUN BLDV-MCNC: 9 MG/DL (ref 6–20)
BUN/CREAT BLD: ABNORMAL (ref 9–20)
BUN/CREAT BLD: ABNORMAL (ref 9–20)
CALCIUM SERPL-MCNC: 7.4 MG/DL (ref 8.6–10.4)
CALCIUM SERPL-MCNC: 7.5 MG/DL (ref 8.6–10.4)
CHLORIDE BLD-SCNC: 111 MMOL/L (ref 98–107)
CHLORIDE BLD-SCNC: 113 MMOL/L (ref 98–107)
CO2: 20 MMOL/L (ref 20–31)
CO2: 20 MMOL/L (ref 20–31)
CREAT SERPL-MCNC: 0.71 MG/DL (ref 0.7–1.2)
CREAT SERPL-MCNC: 0.72 MG/DL (ref 0.7–1.2)
DIFFERENTIAL TYPE: ABNORMAL
EOSINOPHILS RELATIVE PERCENT: 0 % (ref 1–4)
EOSINOPHILS RELATIVE PERCENT: 0 % (ref 1–4)
EOSINOPHILS RELATIVE PERCENT: 1 % (ref 1–4)
FIO2: 30
FIO2: 30
GFR AFRICAN AMERICAN: >60 ML/MIN
GFR AFRICAN AMERICAN: >60 ML/MIN
GFR NON-AFRICAN AMERICAN: >60 ML/MIN
GFR NON-AFRICAN AMERICAN: >60 ML/MIN
GFR SERPL CREATININE-BSD FRML MDRD: ABNORMAL ML/MIN/{1.73_M2}
GLUCOSE BLD-MCNC: 112 MG/DL (ref 74–100)
GLUCOSE BLD-MCNC: 117 MG/DL (ref 75–110)
GLUCOSE BLD-MCNC: 118 MG/DL (ref 70–99)
GLUCOSE BLD-MCNC: 94 MG/DL (ref 75–110)
GLUCOSE BLD-MCNC: 97 MG/DL (ref 75–110)
GLUCOSE BLD-MCNC: 99 MG/DL (ref 70–99)
HCT VFR BLD CALC: 21.4 % (ref 40.7–50.3)
HCT VFR BLD CALC: 22.9 % (ref 40.7–50.3)
HCT VFR BLD CALC: 23.9 % (ref 40.7–50.3)
HEMOGLOBIN: 7.1 G/DL (ref 13–17)
HEMOGLOBIN: 7.5 G/DL (ref 13–17)
HEMOGLOBIN: 7.9 G/DL (ref 13–17)
IMMATURE GRANULOCYTES: 0 %
IMMATURE GRANULOCYTES: 0 %
IMMATURE GRANULOCYTES: 1 %
LYMPHOCYTES # BLD: 22 % (ref 25–45)
LYMPHOCYTES # BLD: 24 % (ref 25–45)
LYMPHOCYTES # BLD: 40 % (ref 25–45)
MAGNESIUM: 1.8 MG/DL (ref 1.6–2.6)
MAGNESIUM: 1.9 MG/DL (ref 1.6–2.6)
MCH RBC QN AUTO: 30.9 PG (ref 25.2–33.5)
MCH RBC QN AUTO: 31.4 PG (ref 25.2–33.5)
MCH RBC QN AUTO: 31.7 PG (ref 25.2–33.5)
MCHC RBC AUTO-ENTMCNC: 32.8 G/DL (ref 28.4–34.8)
MCHC RBC AUTO-ENTMCNC: 33.1 G/DL (ref 28.4–34.8)
MCHC RBC AUTO-ENTMCNC: 33.2 G/DL (ref 28.4–34.8)
MCV RBC AUTO: 93.4 FL (ref 82.6–102.9)
MCV RBC AUTO: 95.5 FL (ref 82.6–102.9)
MCV RBC AUTO: 95.8 FL (ref 82.6–102.9)
MODE: ABNORMAL
MODE: AC
MONOCYTES # BLD: 6 % (ref 2–8)
MRSA, DNA, NASAL: NORMAL
NEGATIVE BASE EXCESS, ART: 1 (ref 0–2)
NEGATIVE BASE EXCESS, ART: 2 (ref 0–2)
NRBC AUTOMATED: 0 PER 100 WBC
O2 DEVICE/FLOW/%: ABNORMAL
O2 DEVICE/FLOW/%: ABNORMAL
PATIENT TEMP: 36.2
PATIENT TEMP: ABNORMAL
PDW BLD-RTO: 13.9 % (ref 11.8–14.4)
PDW BLD-RTO: 14.1 % (ref 11.8–14.4)
PDW BLD-RTO: 14.3 % (ref 11.8–14.4)
PHOSPHORUS: 2.1 MG/DL (ref 2.5–4.5)
PHOSPHORUS: 3.1 MG/DL (ref 2.5–4.5)
PLATELET # BLD: 106 K/UL (ref 138–453)
PLATELET # BLD: 120 K/UL (ref 138–453)
PLATELET # BLD: 82 K/UL (ref 138–453)
PLATELET ESTIMATE: ABNORMAL
PMV BLD AUTO: 10.3 FL (ref 8.1–13.5)
PMV BLD AUTO: 10.4 FL (ref 8.1–13.5)
PMV BLD AUTO: 10.6 FL (ref 8.1–13.5)
POC HCO3: 21.8 MMOL/L (ref 21–28)
POC HCO3: 22.9 MMOL/L (ref 21–28)
POC LACTIC ACID: 0.87 MMOL/L (ref 0.56–1.39)
POC LACTIC ACID: 0.91 MMOL/L (ref 0.56–1.39)
POC O2 SATURATION: 100 % (ref 94–98)
POC O2 SATURATION: 99 % (ref 94–98)
POC PCO2 TEMP: ABNORMAL MM HG
POC PCO2 TEMP: ABNORMAL MM HG
POC PCO2: 29.4 MM HG (ref 35–48)
POC PCO2: 34.7 MM HG (ref 35–48)
POC PH TEMP: ABNORMAL
POC PH TEMP: ABNORMAL
POC PH: 7.43 (ref 7.35–7.45)
POC PH: 7.48 (ref 7.35–7.45)
POC PO2 TEMP: ABNORMAL MM HG
POC PO2 TEMP: ABNORMAL MM HG
POC PO2: 154.7 MM HG (ref 83–108)
POC PO2: 166.7 MM HG (ref 83–108)
POSITIVE BASE EXCESS, ART: ABNORMAL (ref 0–3)
POSITIVE BASE EXCESS, ART: ABNORMAL (ref 0–3)
POTASSIUM SERPL-SCNC: 3.6 MMOL/L (ref 3.7–5.3)
POTASSIUM SERPL-SCNC: 3.6 MMOL/L (ref 3.7–5.3)
RBC # BLD: 2.24 M/UL (ref 4.21–5.77)
RBC # BLD: 2.39 M/UL (ref 4.21–5.77)
RBC # BLD: 2.56 M/UL (ref 4.21–5.77)
RBC # BLD: ABNORMAL 10*6/UL
SAMPLE SITE: ABNORMAL
SAMPLE SITE: ABNORMAL
SEG NEUTROPHILS: 53 % (ref 34–64)
SEG NEUTROPHILS: 69 % (ref 34–64)
SEG NEUTROPHILS: 71 % (ref 34–64)
SEGMENTED NEUTROPHILS ABSOLUTE COUNT: 4.72 K/UL (ref 1.5–8.1)
SEGMENTED NEUTROPHILS ABSOLUTE COUNT: 6.26 K/UL (ref 1.5–8.1)
SEGMENTED NEUTROPHILS ABSOLUTE COUNT: 7.75 K/UL (ref 1.5–8.1)
SODIUM BLD-SCNC: 138 MMOL/L (ref 135–144)
SODIUM BLD-SCNC: 143 MMOL/L (ref 135–144)
SPECIMEN DESCRIPTION: NORMAL
TCO2 (CALC), ART: 23 MMOL/L (ref 22–29)
TCO2 (CALC), ART: 24 MMOL/L (ref 22–29)
WBC # BLD: 10.9 K/UL (ref 4.5–13.5)
WBC # BLD: 9 K/UL (ref 4.5–13.5)
WBC # BLD: 9.1 K/UL (ref 4.5–13.5)
WBC # BLD: ABNORMAL 10*3/UL

## 2020-12-03 PROCEDURE — 2500000003 HC RX 250 WO HCPCS: Performed by: NURSE ANESTHETIST, CERTIFIED REGISTERED

## 2020-12-03 PROCEDURE — 2580000003 HC RX 258: Performed by: PLASTIC SURGERY

## 2020-12-03 PROCEDURE — 6360000002 HC RX W HCPCS: Performed by: STUDENT IN AN ORGANIZED HEALTH CARE EDUCATION/TRAINING PROGRAM

## 2020-12-03 PROCEDURE — 6360000002 HC RX W HCPCS: Performed by: NURSE ANESTHETIST, CERTIFIED REGISTERED

## 2020-12-03 PROCEDURE — APPSS30 APP SPLIT SHARED TIME 16-30 MINUTES: Performed by: REGISTERED NURSE

## 2020-12-03 PROCEDURE — 3600000015 HC SURGERY LEVEL 5 ADDTL 15MIN: Performed by: PLASTIC SURGERY

## 2020-12-03 PROCEDURE — 82803 BLOOD GASES ANY COMBINATION: CPT

## 2020-12-03 PROCEDURE — 82947 ASSAY GLUCOSE BLOOD QUANT: CPT

## 2020-12-03 PROCEDURE — 94003 VENT MGMT INPAT SUBQ DAY: CPT

## 2020-12-03 PROCEDURE — 2580000003 HC RX 258: Performed by: STUDENT IN AN ORGANIZED HEALTH CARE EDUCATION/TRAINING PROGRAM

## 2020-12-03 PROCEDURE — 2580000003 HC RX 258: Performed by: NURSE PRACTITIONER

## 2020-12-03 PROCEDURE — 94761 N-INVAS EAR/PLS OXIMETRY MLT: CPT

## 2020-12-03 PROCEDURE — 3600000005 HC SURGERY LEVEL 5 BASE: Performed by: PLASTIC SURGERY

## 2020-12-03 PROCEDURE — 71045 X-RAY EXAM CHEST 1 VIEW: CPT

## 2020-12-03 PROCEDURE — 2500000003 HC RX 250 WO HCPCS: Performed by: STUDENT IN AN ORGANIZED HEALTH CARE EDUCATION/TRAINING PROGRAM

## 2020-12-03 PROCEDURE — 2580000003 HC RX 258: Performed by: NURSE ANESTHETIST, CERTIFIED REGISTERED

## 2020-12-03 PROCEDURE — 99024 POSTOP FOLLOW-UP VISIT: CPT | Performed by: NEUROLOGICAL SURGERY

## 2020-12-03 PROCEDURE — 6370000000 HC RX 637 (ALT 250 FOR IP): Performed by: NURSE PRACTITIONER

## 2020-12-03 PROCEDURE — 83605 ASSAY OF LACTIC ACID: CPT

## 2020-12-03 PROCEDURE — L0120 CERV FLEX N/ADJ FOAM PRE OTS: HCPCS | Performed by: PLASTIC SURGERY

## 2020-12-03 PROCEDURE — 6370000000 HC RX 637 (ALT 250 FOR IP): Performed by: STUDENT IN AN ORGANIZED HEALTH CARE EDUCATION/TRAINING PROGRAM

## 2020-12-03 PROCEDURE — 83735 ASSAY OF MAGNESIUM: CPT

## 2020-12-03 PROCEDURE — 2000000000 HC ICU R&B

## 2020-12-03 PROCEDURE — C1769 GUIDE WIRE: HCPCS | Performed by: PLASTIC SURGERY

## 2020-12-03 PROCEDURE — 0NST35Z REPOSITION RIGHT MANDIBLE WITH EXTERNAL FIXATION DEVICE, PERCUTANEOUS APPROACH: ICD-10-PCS | Performed by: PLASTIC SURGERY

## 2020-12-03 PROCEDURE — 37799 UNLISTED PX VASCULAR SURGERY: CPT

## 2020-12-03 PROCEDURE — 3700000001 HC ADD 15 MINUTES (ANESTHESIA): Performed by: PLASTIC SURGERY

## 2020-12-03 PROCEDURE — 08QQXZZ REPAIR RIGHT LOWER EYELID, EXTERNAL APPROACH: ICD-10-PCS | Performed by: PLASTIC SURGERY

## 2020-12-03 PROCEDURE — 3700000000 HC ANESTHESIA ATTENDED CARE: Performed by: PLASTIC SURGERY

## 2020-12-03 PROCEDURE — 0DH63UZ INSERTION OF FEEDING DEVICE INTO STOMACH, PERCUTANEOUS APPROACH: ICD-10-PCS | Performed by: SURGERY

## 2020-12-03 PROCEDURE — 2700000000 HC OXYGEN THERAPY PER DAY

## 2020-12-03 PROCEDURE — 0B110F4 BYPASS TRACHEA TO CUTANEOUS WITH TRACHEOSTOMY DEVICE, OPEN APPROACH: ICD-10-PCS | Performed by: SURGERY

## 2020-12-03 PROCEDURE — 85025 COMPLETE CBC W/AUTO DIFF WBC: CPT

## 2020-12-03 PROCEDURE — 94770 HC ETCO2 MONITOR DAILY: CPT

## 2020-12-03 PROCEDURE — 80048 BASIC METABOLIC PNL TOTAL CA: CPT

## 2020-12-03 PROCEDURE — C1713 ANCHOR/SCREW BN/BN,TIS/BN: HCPCS | Performed by: PLASTIC SURGERY

## 2020-12-03 PROCEDURE — 36415 COLL VENOUS BLD VENIPUNCTURE: CPT

## 2020-12-03 PROCEDURE — 84100 ASSAY OF PHOSPHORUS: CPT

## 2020-12-03 PROCEDURE — 82330 ASSAY OF CALCIUM: CPT

## 2020-12-03 PROCEDURE — 99254 IP/OBS CNSLTJ NEW/EST MOD 60: CPT | Performed by: FAMILY MEDICINE

## 2020-12-03 PROCEDURE — 09QKXZZ REPAIR NASAL MUCOSA AND SOFT TISSUE, EXTERNAL APPROACH: ICD-10-PCS | Performed by: PLASTIC SURGERY

## 2020-12-03 PROCEDURE — 2709999900 HC NON-CHARGEABLE SUPPLY: Performed by: PLASTIC SURGERY

## 2020-12-03 PROCEDURE — 2720000010 HC SURG SUPPLY STERILE: Performed by: PLASTIC SURGERY

## 2020-12-03 DEVICE — HYBRID MMF PLATE
Type: IMPLANTABLE DEVICE | Status: FUNCTIONAL
Brand: SMARTLOCK

## 2020-12-03 DEVICE — LOCKING SCREWS, SELF DRILLING
Type: IMPLANTABLE DEVICE | Status: FUNCTIONAL
Brand: SMARTLOCK

## 2020-12-03 RX ORDER — SODIUM CHLORIDE 9 MG/ML
INJECTION, SOLUTION INTRAVENOUS CONTINUOUS PRN
Status: DISCONTINUED | OUTPATIENT
Start: 2020-12-03 | End: 2020-12-03 | Stop reason: SDUPTHER

## 2020-12-03 RX ORDER — ROCURONIUM BROMIDE 10 MG/ML
INJECTION, SOLUTION INTRAVENOUS PRN
Status: DISCONTINUED | OUTPATIENT
Start: 2020-12-03 | End: 2020-12-03 | Stop reason: SDUPTHER

## 2020-12-03 RX ORDER — PHENYLEPHRINE HYDROCHLORIDE 10 MG/ML
INJECTION INTRAVENOUS PRN
Status: DISCONTINUED | OUTPATIENT
Start: 2020-12-03 | End: 2020-12-03 | Stop reason: SDUPTHER

## 2020-12-03 RX ORDER — MAGNESIUM HYDROXIDE 1200 MG/15ML
LIQUID ORAL CONTINUOUS PRN
Status: COMPLETED | OUTPATIENT
Start: 2020-12-03 | End: 2020-12-03

## 2020-12-03 RX ORDER — MIDAZOLAM HYDROCHLORIDE 1 MG/ML
INJECTION INTRAMUSCULAR; INTRAVENOUS PRN
Status: DISCONTINUED | OUTPATIENT
Start: 2020-12-03 | End: 2020-12-03 | Stop reason: SDUPTHER

## 2020-12-03 RX ORDER — 0.9 % SODIUM CHLORIDE 0.9 %
1000 INTRAVENOUS SOLUTION INTRAVENOUS ONCE
Status: COMPLETED | OUTPATIENT
Start: 2020-12-03 | End: 2020-12-03

## 2020-12-03 RX ADMIN — SODIUM CHLORIDE: 9 INJECTION, SOLUTION INTRAVENOUS at 00:59

## 2020-12-03 RX ADMIN — ACETAMINOPHEN 1000 MG: 650 SOLUTION ORAL at 14:14

## 2020-12-03 RX ADMIN — IBUPROFEN 400 MG: 200 SUSPENSION ORAL at 09:14

## 2020-12-03 RX ADMIN — ROCURONIUM BROMIDE 20 MG: 10 INJECTION, SOLUTION INTRAVENOUS at 17:46

## 2020-12-03 RX ADMIN — SODIUM CHLORIDE 1000 ML: 9 INJECTION, SOLUTION INTRAVENOUS at 10:40

## 2020-12-03 RX ADMIN — ENOXAPARIN SODIUM 30 MG: 30 INJECTION SUBCUTANEOUS at 21:45

## 2020-12-03 RX ADMIN — IBUPROFEN 400 MG: 200 SUSPENSION ORAL at 14:14

## 2020-12-03 RX ADMIN — IBUPROFEN 400 MG: 200 SUSPENSION ORAL at 22:20

## 2020-12-03 RX ADMIN — SODIUM CHLORIDE 3 G: 900 INJECTION INTRAVENOUS at 09:06

## 2020-12-03 RX ADMIN — ROCURONIUM BROMIDE 20 MG: 10 INJECTION, SOLUTION INTRAVENOUS at 17:26

## 2020-12-03 RX ADMIN — SODIUM CHLORIDE 1000 ML: 9 INJECTION, SOLUTION INTRAVENOUS at 19:02

## 2020-12-03 RX ADMIN — SODIUM CHLORIDE 60 ML: 9 INJECTION, SOLUTION INTRAVENOUS at 14:10

## 2020-12-03 RX ADMIN — POLYETHYLENE GLYCOL 3350 17 G: 17 POWDER, FOR SOLUTION ORAL at 09:14

## 2020-12-03 RX ADMIN — ROCURONIUM BROMIDE 20 MG: 10 INJECTION, SOLUTION INTRAVENOUS at 15:42

## 2020-12-03 RX ADMIN — SODIUM CHLORIDE, PRESERVATIVE FREE 10 ML: 5 INJECTION INTRAVENOUS at 21:46

## 2020-12-03 RX ADMIN — ACETAMINOPHEN 1000 MG: 650 SOLUTION ORAL at 22:20

## 2020-12-03 RX ADMIN — Medication 200 MCG/HR: at 01:05

## 2020-12-03 RX ADMIN — DOCUSATE SODIUM 50MG AND SENNOSIDES 8.6MG 1 TABLET: 8.6; 5 TABLET, FILM COATED ORAL at 09:06

## 2020-12-03 RX ADMIN — ROCURONIUM BROMIDE 20 MG: 10 INJECTION, SOLUTION INTRAVENOUS at 16:31

## 2020-12-03 RX ADMIN — PROPOFOL 30 MCG/KG/MIN: 10 INJECTION, EMULSION INTRAVENOUS at 06:30

## 2020-12-03 RX ADMIN — ROCURONIUM BROMIDE 20 MG: 10 INJECTION, SOLUTION INTRAVENOUS at 16:05

## 2020-12-03 RX ADMIN — SODIUM CHLORIDE 3 G: 900 INJECTION INTRAVENOUS at 03:42

## 2020-12-03 RX ADMIN — SODIUM CHLORIDE: 9 INJECTION, SOLUTION INTRAVENOUS at 15:10

## 2020-12-03 RX ADMIN — PROPOFOL 30 MCG/KG/MIN: 10 INJECTION, EMULSION INTRAVENOUS at 14:09

## 2020-12-03 RX ADMIN — SODIUM CHLORIDE 3 G: 900 INJECTION INTRAVENOUS at 17:00

## 2020-12-03 RX ADMIN — Medication 200 MCG/HR: at 19:01

## 2020-12-03 RX ADMIN — FAMOTIDINE 20 MG: 10 INJECTION INTRAVENOUS at 21:45

## 2020-12-03 RX ADMIN — LEVETIRACETAM 1000 MG: 100 SOLUTION ORAL at 21:45

## 2020-12-03 RX ADMIN — ROCURONIUM BROMIDE 40 MG: 10 INJECTION, SOLUTION INTRAVENOUS at 15:10

## 2020-12-03 RX ADMIN — VASOPRESSIN 0.04 UNITS/MIN: 20 INJECTION INTRAVENOUS at 14:10

## 2020-12-03 RX ADMIN — VASOPRESSIN 0.04 UNITS/MIN: 20 INJECTION INTRAVENOUS at 22:22

## 2020-12-03 RX ADMIN — METHOCARBAMOL TABLETS 750 MG: 750 TABLET, COATED ORAL at 12:06

## 2020-12-03 RX ADMIN — LEVETIRACETAM 1000 MG: 100 SOLUTION ORAL at 09:05

## 2020-12-03 RX ADMIN — Medication 200 MCG/HR: at 11:04

## 2020-12-03 RX ADMIN — PROPOFOL 35 MCG/KG/MIN: 10 INJECTION, EMULSION INTRAVENOUS at 02:29

## 2020-12-03 RX ADMIN — SODIUM CHLORIDE, PRESERVATIVE FREE 10 ML: 5 INJECTION INTRAVENOUS at 09:14

## 2020-12-03 RX ADMIN — Medication 200 MCG/HR: at 06:03

## 2020-12-03 RX ADMIN — FAMOTIDINE 20 MG: 10 INJECTION INTRAVENOUS at 09:05

## 2020-12-03 RX ADMIN — MIDAZOLAM HYDROCHLORIDE 2 MG: 1 INJECTION, SOLUTION INTRAMUSCULAR; INTRAVENOUS at 17:46

## 2020-12-03 RX ADMIN — VASOPRESSIN 0.04 UNITS/MIN: 20 INJECTION INTRAVENOUS at 08:07

## 2020-12-03 RX ADMIN — SODIUM CHLORIDE 3 G: 900 INJECTION INTRAVENOUS at 21:45

## 2020-12-03 RX ADMIN — PHENYLEPHRINE HYDROCHLORIDE 100 MCG: 10 INJECTION INTRAVENOUS at 15:33

## 2020-12-03 RX ADMIN — METHOCARBAMOL TABLETS 750 MG: 750 TABLET, COATED ORAL at 03:43

## 2020-12-03 ASSESSMENT — PULMONARY FUNCTION TESTS
PIF_VALUE: 16
PIF_VALUE: 16
PIF_VALUE: 28
PIF_VALUE: 18
PIF_VALUE: 16
PIF_VALUE: 18
PIF_VALUE: 29
PIF_VALUE: 19
PIF_VALUE: 17
PIF_VALUE: 22
PIF_VALUE: 16
PIF_VALUE: 17
PIF_VALUE: 16
PIF_VALUE: 18
PIF_VALUE: 23
PIF_VALUE: 19
PIF_VALUE: 16
PIF_VALUE: 30
PIF_VALUE: 17
PIF_VALUE: 16
PIF_VALUE: 17
PIF_VALUE: 30
PIF_VALUE: 32
PIF_VALUE: 16
PIF_VALUE: 16
PIF_VALUE: 19
PIF_VALUE: 16
PIF_VALUE: 36
PIF_VALUE: 21
PIF_VALUE: 29
PIF_VALUE: 22
PIF_VALUE: 17
PIF_VALUE: 37
PIF_VALUE: 27
PIF_VALUE: 17
PIF_VALUE: 19
PIF_VALUE: 15
PIF_VALUE: 16
PIF_VALUE: 16
PIF_VALUE: 22
PIF_VALUE: 18
PIF_VALUE: 17
PIF_VALUE: 29
PIF_VALUE: 19
PIF_VALUE: 17
PIF_VALUE: 22
PIF_VALUE: 18
PIF_VALUE: 26
PIF_VALUE: 16
PIF_VALUE: 16
PIF_VALUE: 26
PIF_VALUE: 20
PIF_VALUE: 16
PIF_VALUE: 16
PIF_VALUE: 18
PIF_VALUE: 16
PIF_VALUE: 19
PIF_VALUE: 17
PIF_VALUE: 16
PIF_VALUE: 16
PIF_VALUE: 17
PIF_VALUE: 30
PIF_VALUE: 16
PIF_VALUE: 19
PIF_VALUE: 29
PIF_VALUE: 16
PIF_VALUE: 18
PIF_VALUE: 20
PIF_VALUE: 16
PIF_VALUE: 22
PIF_VALUE: 16
PIF_VALUE: 17
PIF_VALUE: 16
PIF_VALUE: 17
PIF_VALUE: 19
PIF_VALUE: 18
PIF_VALUE: 16
PIF_VALUE: 31
PIF_VALUE: 22
PIF_VALUE: 21
PIF_VALUE: 19
PIF_VALUE: 22
PIF_VALUE: 16
PIF_VALUE: 28
PIF_VALUE: 16
PIF_VALUE: 30
PIF_VALUE: 18
PIF_VALUE: 16
PIF_VALUE: 22
PIF_VALUE: 17
PIF_VALUE: 17
PIF_VALUE: 20
PIF_VALUE: 17
PIF_VALUE: 16
PIF_VALUE: 16
PIF_VALUE: 18
PIF_VALUE: 17
PIF_VALUE: 16
PIF_VALUE: 19
PIF_VALUE: 16
PIF_VALUE: 18
PIF_VALUE: 18
PIF_VALUE: 16
PIF_VALUE: 16
PIF_VALUE: 22
PIF_VALUE: 16
PIF_VALUE: 18
PIF_VALUE: 18
PIF_VALUE: 37
PIF_VALUE: 29
PIF_VALUE: 21
PIF_VALUE: 16
PIF_VALUE: 39
PIF_VALUE: 16
PIF_VALUE: 17
PIF_VALUE: 16
PIF_VALUE: 27
PIF_VALUE: 17
PIF_VALUE: 18
PIF_VALUE: 30
PIF_VALUE: 18
PIF_VALUE: 17
PIF_VALUE: 16
PIF_VALUE: 15
PIF_VALUE: 16
PIF_VALUE: 16
PIF_VALUE: 20
PIF_VALUE: 27
PIF_VALUE: 17
PIF_VALUE: 17
PIF_VALUE: 38
PIF_VALUE: 20
PIF_VALUE: 23
PIF_VALUE: 16
PIF_VALUE: 28
PIF_VALUE: 17
PIF_VALUE: 26
PIF_VALUE: 18
PIF_VALUE: 16
PIF_VALUE: 21
PIF_VALUE: 18
PIF_VALUE: 44
PIF_VALUE: 21
PIF_VALUE: 17
PIF_VALUE: 16
PIF_VALUE: 22
PIF_VALUE: 22

## 2020-12-03 NOTE — PLAN OF CARE
Problem: Restraint Use - Nonviolent/Non-Self-Destructive Behavior:  Goal: Absence of restraint-related injury  Description: Absence of restraint-related injury  Outcome: Ongoing     Problem: Falls - Risk of:  Goal: Will remain free from falls  Description: Will remain free from falls  Outcome: Ongoing     Problem: Falls - Risk of:  Goal: Absence of physical injury  Description: Absence of physical injury  Outcome: Ongoing     Problem: Skin Integrity:  Goal: Will show no infection signs and symptoms  Description: Will show no infection signs and symptoms  Outcome: Ongoing     Problem: Skin Integrity:  Goal: Absence of new skin breakdown  Description: Absence of new skin breakdown  Outcome: Ongoing     Problem: OXYGENATION/RESPIRATORY FUNCTION  Goal: Patient will achieve/maintain normal respiratory rate/effort  Description: Respiratory rate and effort will be within normal limits for the patient  12/2/2020 2228 by Carmen Juarez RCP  Outcome: Ongoing

## 2020-12-03 NOTE — FLOWSHEET NOTE
attempted to call patients Father-no answer. RN Attempted to call patients sister Joel Hunter to get in contact with either of patients parents. No answer at this time.      Electronically signed by Jazmine Alcocer RN on 12/3/2020 at 10:53 AM
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
## 2020-12-03 NOTE — PROGRESS NOTES
Occupational [address]  Occupational Therapy Not Seen Note    DATE: 12/3/2020  Name: Nedra Halsted  : 2000  MRN: [id number]    Patient not available for Occupational Therapy due to:    [x] Other: Pt intubated/sedated and going to OR today. Will check back as able.      Next Scheduled Treatment: Will check back as able     Isauro Bonilla OTR/L

## 2020-12-03 NOTE — PLAN OF CARE
Problem: OXYGENATION/RESPIRATORY FUNCTION  Goal: Patient will maintain patent airway  12/3/2020 0846 by Anny Orellana RCP  Outcome: Ongoing     Problem: OXYGENATION/RESPIRATORY FUNCTION  Goal: Patient will achieve/maintain normal respiratory rate/effort  Description: Respiratory rate and effort will be within normal limits for the patient  12/3/2020 0846 by Anny Orellana RCP  Outcome: Ongoing     Problem: MECHANICAL VENTILATION  Goal: Patient will maintain patent airway  12/3/2020 0846 by Anny Orellana RCP  Outcome: Ongoing     Problem: MECHANICAL VENTILATION  Goal: Oral health is maintained or improved  12/3/2020 0846 by Anny Orellana RCP  Outcome: Ongoing     Problem: MECHANICAL VENTILATION  Goal: ET tube will be managed safely  12/3/2020 0846 by Anny Orellana RCP  Outcome: Ongoing     Problem: MECHANICAL VENTILATION  Goal: Ability to express needs and understand communication  12/3/2020 0846 by Anny Orellana RCP  Outcome: Ongoing     Problem: MECHANICAL VENTILATION  Goal: Mobility/activity is maintained at optimum level for patient  12/3/2020 0846 by Anny Orellana RCP  Outcome: Ongoing     Problem: SKIN INTEGRITY  Goal: Skin integrity is maintained or improved  12/3/2020 0846 by Anny Orellana RCP  Outcome: Ongoing
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
## 2020-12-03 NOTE — FLOWSHEET NOTE
Attempted to call patients mother CALEB Radford ([phone]) no answer. Voicemail was left to please return phone call.     Electronically signed by Jennifer Becker RN on 12/3/2020 at 10:51 AM
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
## 2020-12-03 NOTE — PLAN OF CARE
Ct myelogram negative for persistent stenosis.  No further surgery     Georgie Felipe DO  Neurosurgery  O: Hank  C: [phone]
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
## 2020-12-04 ENCOUNTER — APPOINTMENT (OUTPATIENT)
Dept: GENERAL RADIOLOGY | Age: 20
DRG: 004 | End: 2020-12-04
Payer: MEDICAID

## 2020-12-04 LAB
ABSOLUTE EOS #: <0.03 K/UL (ref 0–0.44)
ABSOLUTE IMMATURE GRANULOCYTE: 0.04 K/UL (ref 0–0.3)
ABSOLUTE LYMPH #: 1.72 K/UL (ref 1.1–3.7)
ABSOLUTE MONO #: 0.43 K/UL (ref 0.1–1.4)
ALLEN TEST: ABNORMAL
ANION GAP SERPL CALCULATED.3IONS-SCNC: 7 MMOL/L (ref 9–17)
ANION GAP SERPL CALCULATED.3IONS-SCNC: 8 MMOL/L (ref 9–17)
BASOPHILS # BLD: 0 % (ref 0–2)
BASOPHILS ABSOLUTE: <0.03 K/UL (ref 0–0.2)
BUN BLDV-MCNC: 10 MG/DL (ref 6–20)
BUN BLDV-MCNC: 12 MG/DL (ref 6–20)
BUN/CREAT BLD: ABNORMAL (ref 9–20)
BUN/CREAT BLD: ABNORMAL (ref 9–20)
CALCIUM IONIZED: 1.14 MMOL/L (ref 1.13–1.33)
CALCIUM SERPL-MCNC: 7.4 MG/DL (ref 8.6–10.4)
CALCIUM SERPL-MCNC: 7.8 MG/DL (ref 8.6–10.4)
CHLORIDE BLD-SCNC: 111 MMOL/L (ref 98–107)
CHLORIDE BLD-SCNC: 113 MMOL/L (ref 98–107)
CO2: 22 MMOL/L (ref 20–31)
CO2: 22 MMOL/L (ref 20–31)
CREAT SERPL-MCNC: 0.66 MG/DL (ref 0.7–1.2)
CREAT SERPL-MCNC: 0.68 MG/DL (ref 0.7–1.2)
DIFFERENTIAL TYPE: ABNORMAL
EOSINOPHILS RELATIVE PERCENT: 0 % (ref 1–4)
FIO2: 30
GFR AFRICAN AMERICAN: >60 ML/MIN
GFR AFRICAN AMERICAN: >60 ML/MIN
GFR NON-AFRICAN AMERICAN: >60 ML/MIN
GFR NON-AFRICAN AMERICAN: >60 ML/MIN
GFR SERPL CREATININE-BSD FRML MDRD: ABNORMAL ML/MIN/{1.73_M2}
GLUCOSE BLD-MCNC: 110 MG/DL (ref 74–100)
GLUCOSE BLD-MCNC: 115 MG/DL (ref 70–99)
GLUCOSE BLD-MCNC: 119 MG/DL (ref 70–99)
HCT VFR BLD CALC: 20.9 % (ref 40.7–50.3)
HCT VFR BLD CALC: 25.5 % (ref 40.7–50.3)
HEMOGLOBIN: 6.8 G/DL (ref 13–17)
HEMOGLOBIN: 8.3 G/DL (ref 13–17)
IMMATURE GRANULOCYTES: 1 %
LYMPHOCYTES # BLD: 25 % (ref 25–45)
MAGNESIUM: 1.7 MG/DL (ref 1.6–2.6)
MCH RBC QN AUTO: 31.2 PG (ref 25.2–33.5)
MCHC RBC AUTO-ENTMCNC: 32.5 G/DL (ref 28.4–34.8)
MCV RBC AUTO: 95.9 FL (ref 82.6–102.9)
MODE: ABNORMAL
MONOCYTES # BLD: 6 % (ref 2–8)
NEGATIVE BASE EXCESS, ART: ABNORMAL (ref 0–2)
NRBC AUTOMATED: 0 PER 100 WBC
O2 DEVICE/FLOW/%: ABNORMAL
PATIENT TEMP: ABNORMAL
PDW BLD-RTO: 14.4 % (ref 11.8–14.4)
PHOSPHORUS: 3.2 MG/DL (ref 2.5–4.5)
PHOSPHORUS: 3.7 MG/DL (ref 2.5–4.5)
PLATELET # BLD: 91 K/UL (ref 138–453)
PLATELET ESTIMATE: ABNORMAL
PMV BLD AUTO: 11 FL (ref 8.1–13.5)
POC HCO3: 24.8 MMOL/L (ref 21–28)
POC LACTIC ACID: 0.68 MMOL/L (ref 0.56–1.39)
POC O2 SATURATION: 99 % (ref 94–98)
POC PCO2 TEMP: ABNORMAL MM HG
POC PCO2: 41.8 MM HG (ref 35–48)
POC PH TEMP: ABNORMAL
POC PH: 7.38 (ref 7.35–7.45)
POC PO2 TEMP: ABNORMAL MM HG
POC PO2: 133.2 MM HG (ref 83–108)
POSITIVE BASE EXCESS, ART: 0 (ref 0–3)
POTASSIUM SERPL-SCNC: 3.7 MMOL/L (ref 3.7–5.3)
POTASSIUM SERPL-SCNC: 4 MMOL/L (ref 3.7–5.3)
RBC # BLD: 2.18 M/UL (ref 4.21–5.77)
RBC # BLD: ABNORMAL 10*6/UL
SAMPLE SITE: ABNORMAL
SEG NEUTROPHILS: 68 % (ref 34–64)
SEGMENTED NEUTROPHILS ABSOLUTE COUNT: 4.65 K/UL (ref 1.5–8.1)
SODIUM BLD-SCNC: 141 MMOL/L (ref 135–144)
SODIUM BLD-SCNC: 142 MMOL/L (ref 135–144)
TCO2 (CALC), ART: 26 MMOL/L (ref 22–29)
WBC # BLD: 6.9 K/UL (ref 4.5–13.5)
WBC # BLD: ABNORMAL 10*3/UL

## 2020-12-04 PROCEDURE — 2500000003 HC RX 250 WO HCPCS: Performed by: STUDENT IN AN ORGANIZED HEALTH CARE EDUCATION/TRAINING PROGRAM

## 2020-12-04 PROCEDURE — 6370000000 HC RX 637 (ALT 250 FOR IP): Performed by: STUDENT IN AN ORGANIZED HEALTH CARE EDUCATION/TRAINING PROGRAM

## 2020-12-04 PROCEDURE — 2580000003 HC RX 258: Performed by: STUDENT IN AN ORGANIZED HEALTH CARE EDUCATION/TRAINING PROGRAM

## 2020-12-04 PROCEDURE — 84100 ASSAY OF PHOSPHORUS: CPT

## 2020-12-04 PROCEDURE — 6360000002 HC RX W HCPCS: Performed by: STUDENT IN AN ORGANIZED HEALTH CARE EDUCATION/TRAINING PROGRAM

## 2020-12-04 PROCEDURE — 2500000003 HC RX 250 WO HCPCS: Performed by: NURSE PRACTITIONER

## 2020-12-04 PROCEDURE — 97110 THERAPEUTIC EXERCISES: CPT

## 2020-12-04 PROCEDURE — APPSS45 APP SPLIT SHARED TIME 31-45 MINUTES: Performed by: REGISTERED NURSE

## 2020-12-04 PROCEDURE — 97162 PT EVAL MOD COMPLEX 30 MIN: CPT

## 2020-12-04 PROCEDURE — 6370000000 HC RX 637 (ALT 250 FOR IP): Performed by: NURSE PRACTITIONER

## 2020-12-04 PROCEDURE — 71045 X-RAY EXAM CHEST 1 VIEW: CPT

## 2020-12-04 PROCEDURE — 82803 BLOOD GASES ANY COMBINATION: CPT

## 2020-12-04 PROCEDURE — 82947 ASSAY GLUCOSE BLOOD QUANT: CPT

## 2020-12-04 PROCEDURE — 85014 HEMATOCRIT: CPT

## 2020-12-04 PROCEDURE — 86900 BLOOD TYPING SEROLOGIC ABO: CPT

## 2020-12-04 PROCEDURE — 99232 SBSQ HOSP IP/OBS MODERATE 35: CPT | Performed by: NEUROLOGICAL SURGERY

## 2020-12-04 PROCEDURE — 85018 HEMOGLOBIN: CPT

## 2020-12-04 PROCEDURE — 80048 BASIC METABOLIC PNL TOTAL CA: CPT

## 2020-12-04 PROCEDURE — 36430 TRANSFUSION BLD/BLD COMPNT: CPT

## 2020-12-04 PROCEDURE — 2000000000 HC ICU R&B

## 2020-12-04 PROCEDURE — P9016 RBC LEUKOCYTES REDUCED: HCPCS

## 2020-12-04 PROCEDURE — 83605 ASSAY OF LACTIC ACID: CPT

## 2020-12-04 PROCEDURE — 37799 UNLISTED PX VASCULAR SURGERY: CPT

## 2020-12-04 PROCEDURE — 94003 VENT MGMT INPAT SUBQ DAY: CPT

## 2020-12-04 PROCEDURE — 94770 HC ETCO2 MONITOR DAILY: CPT

## 2020-12-04 PROCEDURE — 94761 N-INVAS EAR/PLS OXIMETRY MLT: CPT

## 2020-12-04 PROCEDURE — 85025 COMPLETE CBC W/AUTO DIFF WBC: CPT

## 2020-12-04 PROCEDURE — 2700000000 HC OXYGEN THERAPY PER DAY

## 2020-12-04 PROCEDURE — 6360000002 HC RX W HCPCS: Performed by: NURSE PRACTITIONER

## 2020-12-04 PROCEDURE — 6360000002 HC RX W HCPCS

## 2020-12-04 RX ORDER — 0.9 % SODIUM CHLORIDE 0.9 %
20 INTRAVENOUS SOLUTION INTRAVENOUS ONCE
Status: COMPLETED | OUTPATIENT
Start: 2020-12-04 | End: 2020-12-04

## 2020-12-04 RX ORDER — MAGNESIUM SULFATE IN WATER 40 MG/ML
2 INJECTION, SOLUTION INTRAVENOUS ONCE
Status: COMPLETED | OUTPATIENT
Start: 2020-12-04 | End: 2020-12-04

## 2020-12-04 RX ORDER — METOCLOPRAMIDE HYDROCHLORIDE 5 MG/ML
10 INJECTION INTRAMUSCULAR; INTRAVENOUS EVERY 6 HOURS
Status: DISCONTINUED | OUTPATIENT
Start: 2020-12-04 | End: 2020-12-06

## 2020-12-04 RX ORDER — QUETIAPINE FUMARATE 25 MG/1
50 TABLET, FILM COATED ORAL 2 TIMES DAILY
Status: DISCONTINUED | OUTPATIENT
Start: 2020-12-04 | End: 2020-12-05

## 2020-12-04 RX ORDER — HALOPERIDOL 5 MG/ML
INJECTION INTRAMUSCULAR
Status: COMPLETED
Start: 2020-12-04 | End: 2020-12-04

## 2020-12-04 RX ORDER — DEXMEDETOMIDINE HYDROCHLORIDE 4 UG/ML
0.2 INJECTION, SOLUTION INTRAVENOUS CONTINUOUS
Status: DISCONTINUED | OUTPATIENT
Start: 2020-12-04 | End: 2020-12-09

## 2020-12-04 RX ORDER — HALOPERIDOL 5 MG/ML
5 INJECTION INTRAMUSCULAR ONCE
Status: COMPLETED | OUTPATIENT
Start: 2020-12-04 | End: 2020-12-04

## 2020-12-04 RX ORDER — OXYCODONE HCL 5 MG/5 ML
5 SOLUTION, ORAL ORAL EVERY 6 HOURS
Status: DISCONTINUED | OUTPATIENT
Start: 2020-12-04 | End: 2020-12-05

## 2020-12-04 RX ADMIN — IBUPROFEN 400 MG: 200 SUSPENSION ORAL at 09:58

## 2020-12-04 RX ADMIN — SODIUM CHLORIDE 3 G: 900 INJECTION INTRAVENOUS at 22:03

## 2020-12-04 RX ADMIN — CHLORHEXIDINE GLUCONATE 15 ML: 1.2 RINSE ORAL at 21:14

## 2020-12-04 RX ADMIN — FAMOTIDINE 20 MG: 10 INJECTION INTRAVENOUS at 21:14

## 2020-12-04 RX ADMIN — METOCLOPRAMIDE 10 MG: 5 INJECTION, SOLUTION INTRAMUSCULAR; INTRAVENOUS at 17:31

## 2020-12-04 RX ADMIN — Medication 150 MCG/HR: at 22:19

## 2020-12-04 RX ADMIN — PROPOFOL 25 MCG/KG/MIN: 10 INJECTION, EMULSION INTRAVENOUS at 09:26

## 2020-12-04 RX ADMIN — HALOPERIDOL LACTATE 5 MG: 5 INJECTION, SOLUTION INTRAMUSCULAR at 19:45

## 2020-12-04 RX ADMIN — METHOCARBAMOL TABLETS 750 MG: 750 TABLET, COATED ORAL at 21:11

## 2020-12-04 RX ADMIN — Medication 100 MCG/HR: at 17:31

## 2020-12-04 RX ADMIN — VASOPRESSIN 0.04 UNITS/MIN: 20 INJECTION INTRAVENOUS at 08:05

## 2020-12-04 RX ADMIN — METHOCARBAMOL TABLETS 750 MG: 750 TABLET, COATED ORAL at 12:14

## 2020-12-04 RX ADMIN — DEXMEDETOMIDINE HYDROCHLORIDE 0.1 MCG/KG/HR: 400 INJECTION INTRAVENOUS at 13:08

## 2020-12-04 RX ADMIN — Medication 200 MCG/HR: at 00:17

## 2020-12-04 RX ADMIN — QUETIAPINE FUMARATE 50 MG: 25 TABLET ORAL at 12:14

## 2020-12-04 RX ADMIN — SODIUM CHLORIDE, PRESERVATIVE FREE 10 ML: 5 INJECTION INTRAVENOUS at 08:27

## 2020-12-04 RX ADMIN — FAMOTIDINE 20 MG: 10 INJECTION INTRAVENOUS at 08:17

## 2020-12-04 RX ADMIN — HALOPERIDOL 5 MG: 5 INJECTION INTRAMUSCULAR at 19:45

## 2020-12-04 RX ADMIN — ACETAMINOPHEN 1000 MG: 650 SOLUTION ORAL at 22:05

## 2020-12-04 RX ADMIN — MAGNESIUM SULFATE 2 G: 2 INJECTION INTRAVENOUS at 03:30

## 2020-12-04 RX ADMIN — SODIUM CHLORIDE 20 ML: 9 INJECTION, SOLUTION INTRAVENOUS at 09:55

## 2020-12-04 RX ADMIN — OXYCODONE HYDROCHLORIDE 5 MG: 5 SOLUTION ORAL at 17:31

## 2020-12-04 RX ADMIN — METOCLOPRAMIDE 10 MG: 5 INJECTION, SOLUTION INTRAMUSCULAR; INTRAVENOUS at 12:14

## 2020-12-04 RX ADMIN — ENOXAPARIN SODIUM 30 MG: 30 INJECTION SUBCUTANEOUS at 08:17

## 2020-12-04 RX ADMIN — IBUPROFEN 400 MG: 200 SUSPENSION ORAL at 06:51

## 2020-12-04 RX ADMIN — QUETIAPINE FUMARATE 50 MG: 25 TABLET ORAL at 21:11

## 2020-12-04 RX ADMIN — SODIUM CHLORIDE 3 G: 900 INJECTION INTRAVENOUS at 10:04

## 2020-12-04 RX ADMIN — Medication 200 MCG/HR: at 09:56

## 2020-12-04 RX ADMIN — SODIUM CHLORIDE 3 G: 900 INJECTION INTRAVENOUS at 03:29

## 2020-12-04 RX ADMIN — IBUPROFEN 400 MG: 200 SUSPENSION ORAL at 17:31

## 2020-12-04 RX ADMIN — Medication 200 MCG/HR: at 04:57

## 2020-12-04 RX ADMIN — ENOXAPARIN SODIUM 30 MG: 30 INJECTION SUBCUTANEOUS at 21:11

## 2020-12-04 RX ADMIN — ACETAMINOPHEN 1000 MG: 650 SOLUTION ORAL at 06:31

## 2020-12-04 RX ADMIN — METHOCARBAMOL TABLETS 750 MG: 750 TABLET, COATED ORAL at 03:44

## 2020-12-04 RX ADMIN — IBUPROFEN 400 MG: 200 SUSPENSION ORAL at 13:13

## 2020-12-04 RX ADMIN — OXYCODONE HYDROCHLORIDE 5 MG: 5 SOLUTION ORAL at 12:18

## 2020-12-04 RX ADMIN — SODIUM CHLORIDE 3 G: 900 INJECTION INTRAVENOUS at 16:37

## 2020-12-04 RX ADMIN — LEVETIRACETAM 1000 MG: 100 SOLUTION ORAL at 08:17

## 2020-12-04 RX ADMIN — SODIUM CHLORIDE 1000 ML: 9 INJECTION, SOLUTION INTRAVENOUS at 03:12

## 2020-12-04 RX ADMIN — IBUPROFEN 400 MG: 200 SUSPENSION ORAL at 22:03

## 2020-12-04 RX ADMIN — ACETAMINOPHEN 1000 MG: 650 SOLUTION ORAL at 13:12

## 2020-12-04 RX ADMIN — LEVETIRACETAM 1000 MG: 100 SOLUTION ORAL at 21:11

## 2020-12-04 RX ADMIN — SODIUM CHLORIDE, PRESERVATIVE FREE 10 ML: 5 INJECTION INTRAVENOUS at 21:13

## 2020-12-04 ASSESSMENT — PULMONARY FUNCTION TESTS
PIF_VALUE: 17
PIF_VALUE: 16
PIF_VALUE: 17
PIF_VALUE: 17
PIF_VALUE: 18
PIF_VALUE: 18
PIF_VALUE: 16
PIF_VALUE: 17
PIF_VALUE: 18
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
## 2020-12-04 NOTE — CARE COORDINATION
Attempted to contact patient's father had to leave voicemail.   Left voicemail asking for return call Per RN patient's mother is non verbal.
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
## 2020-12-04 NOTE — PLAN OF CARE
Problem: Restraint Use - Nonviolent/Non-Self-Destructive Behavior:  Goal: Absence of restraint-related injury  Description: Absence of restraint-related injury  Outcome: Ongoing     Problem: Falls - Risk of:  Goal: Will remain free from falls  Description: Will remain free from falls  Outcome: Ongoing     Problem: Falls - Risk of:  Goal: Absence of physical injury  Description: Absence of physical injury  Outcome: Ongoing     Problem: Skin Integrity:  Goal: Will show no infection signs and symptoms  Description: Will show no infection signs and symptoms  Outcome: Ongoing     Problem: Skin Integrity:  Goal: Absence of new skin breakdown  Description: Absence of new skin breakdown  Outcome: Ongoing     Problem: MECHANICAL VENTILATION  Goal: Tracheostomy will be managed safely  12/3/2020 1818 by Antony Barton RCP  Outcome: Ongoing

## 2020-12-04 NOTE — PROGRESS NOTES
[facility]  Occupational Therapy Not Seen Note    DATE: 2020  Name: Nguyen Salazar  : 2000  MRN: [id number]    Patient not available for Occupational Therapy due to:    [] Testing:    [] Hemodialysis    [] Blood Transfusion in Progress    []Refusal by Patient:    [] Surgery/Procedure:    [] Strict Bedrest    [x] Sedation: Fentanyl,Propofol; Trach    [] Spine Precautions     [] Pt being transferred to palliative care at this time. Spoke with pt/family and OT services to be defered. [] Pt independent with functional mobility and functional tasks.  Pt with no OT acute care needs at this time, will defer OT eval.    [] Other    Next Scheduled Treatment: Ck      Braden Zamorano, OTR/L
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
## 2020-12-05 ENCOUNTER — APPOINTMENT (OUTPATIENT)
Dept: GENERAL RADIOLOGY | Age: 20
DRG: 004 | End: 2020-12-05
Payer: MEDICAID

## 2020-12-05 ENCOUNTER — APPOINTMENT (OUTPATIENT)
Dept: CT IMAGING | Age: 20
DRG: 004 | End: 2020-12-05
Payer: MEDICAID

## 2020-12-05 LAB
ABO/RH: NORMAL
ABSOLUTE EOS #: 0.05 K/UL (ref 0–0.44)
ABSOLUTE IMMATURE GRANULOCYTE: <0.03 K/UL (ref 0–0.3)
ABSOLUTE LYMPH #: 1.41 K/UL (ref 1.1–3.7)
ABSOLUTE MONO #: 0.36 K/UL (ref 0.1–1.4)
ALLEN TEST: ABNORMAL
ANION GAP SERPL CALCULATED.3IONS-SCNC: 8 MMOL/L (ref 9–17)
ANTIBODY SCREEN: NEGATIVE
ARM BAND NUMBER: NORMAL
BASOPHILS # BLD: 0 % (ref 0–2)
BASOPHILS ABSOLUTE: <0.03 K/UL (ref 0–0.2)
BLD PROD TYP BPU: NORMAL
BUN BLDV-MCNC: 7 MG/DL (ref 6–20)
BUN/CREAT BLD: ABNORMAL (ref 9–20)
CALCIUM SERPL-MCNC: 7.7 MG/DL (ref 8.6–10.4)
CHLORIDE BLD-SCNC: 115 MMOL/L (ref 98–107)
CO2: 23 MMOL/L (ref 20–31)
CREAT SERPL-MCNC: 0.55 MG/DL (ref 0.7–1.2)
CROSSMATCH RESULT: NORMAL
DIFFERENTIAL TYPE: ABNORMAL
DISPENSE STATUS BLOOD BANK: NORMAL
EOSINOPHILS RELATIVE PERCENT: 1 % (ref 1–4)
EXPIRATION DATE: NORMAL
FIO2: 30
GFR AFRICAN AMERICAN: >60 ML/MIN
GFR NON-AFRICAN AMERICAN: >60 ML/MIN
GFR SERPL CREATININE-BSD FRML MDRD: ABNORMAL ML/MIN/{1.73_M2}
GFR SERPL CREATININE-BSD FRML MDRD: ABNORMAL ML/MIN/{1.73_M2}
GLUCOSE BLD-MCNC: 117 MG/DL (ref 74–100)
GLUCOSE BLD-MCNC: 119 MG/DL (ref 70–99)
HCT VFR BLD CALC: 24.7 % (ref 40.7–50.3)
HEMOGLOBIN: 8.1 G/DL (ref 13–17)
IMMATURE GRANULOCYTES: 1 %
LYMPHOCYTES # BLD: 32 % (ref 25–45)
MAGNESIUM: 2.1 MG/DL (ref 1.6–2.6)
MCH RBC QN AUTO: 30.3 PG (ref 25.2–33.5)
MCHC RBC AUTO-ENTMCNC: 32.8 G/DL (ref 28.4–34.8)
MCV RBC AUTO: 92.5 FL (ref 82.6–102.9)
MODE: ABNORMAL
MONOCYTES # BLD: 8 % (ref 2–8)
NEGATIVE BASE EXCESS, ART: ABNORMAL (ref 0–2)
NRBC AUTOMATED: 0 PER 100 WBC
O2 DEVICE/FLOW/%: ABNORMAL
PATIENT TEMP: ABNORMAL
PDW BLD-RTO: 15.8 % (ref 11.8–14.4)
PHOSPHORUS: 1.9 MG/DL (ref 2.5–4.5)
PLATELET # BLD: 103 K/UL (ref 138–453)
PLATELET ESTIMATE: ABNORMAL
PMV BLD AUTO: 10.5 FL (ref 8.1–13.5)
POC HCO3: 26.3 MMOL/L (ref 21–28)
POC LACTIC ACID: 0.34 MMOL/L (ref 0.56–1.39)
POC O2 SATURATION: 98 % (ref 94–98)
POC PCO2 TEMP: ABNORMAL MM HG
POC PCO2: 46.2 MM HG (ref 35–48)
POC PH TEMP: ABNORMAL
POC PH: 7.36 (ref 7.35–7.45)
POC PO2 TEMP: ABNORMAL MM HG
POC PO2: 112.1 MM HG (ref 83–108)
POSITIVE BASE EXCESS, ART: 1 (ref 0–3)
POTASSIUM SERPL-SCNC: 3.4 MMOL/L (ref 3.7–5.3)
RBC # BLD: 2.67 M/UL (ref 4.21–5.77)
RBC # BLD: ABNORMAL 10*6/UL
SAMPLE SITE: ABNORMAL
SEG NEUTROPHILS: 58 % (ref 34–64)
SEGMENTED NEUTROPHILS ABSOLUTE COUNT: 2.55 K/UL (ref 1.5–8.1)
SODIUM BLD-SCNC: 146 MMOL/L (ref 135–144)
TCO2 (CALC), ART: 28 MMOL/L (ref 22–29)
TRANSFUSION STATUS: NORMAL
TRIGL SERPL-MCNC: 87 MG/DL
UNIT DIVISION: 0
UNIT NUMBER: NORMAL
WBC # BLD: 4.4 K/UL (ref 4.5–13.5)
WBC # BLD: ABNORMAL 10*3/UL

## 2020-12-05 PROCEDURE — 6370000000 HC RX 637 (ALT 250 FOR IP): Performed by: NURSE PRACTITIONER

## 2020-12-05 PROCEDURE — 6370000000 HC RX 637 (ALT 250 FOR IP): Performed by: STUDENT IN AN ORGANIZED HEALTH CARE EDUCATION/TRAINING PROGRAM

## 2020-12-05 PROCEDURE — 80048 BASIC METABOLIC PNL TOTAL CA: CPT

## 2020-12-05 PROCEDURE — 94770 HC ETCO2 MONITOR DAILY: CPT

## 2020-12-05 PROCEDURE — 94003 VENT MGMT INPAT SUBQ DAY: CPT

## 2020-12-05 PROCEDURE — 6360000002 HC RX W HCPCS: Performed by: STUDENT IN AN ORGANIZED HEALTH CARE EDUCATION/TRAINING PROGRAM

## 2020-12-05 PROCEDURE — 83735 ASSAY OF MAGNESIUM: CPT

## 2020-12-05 PROCEDURE — 2500000003 HC RX 250 WO HCPCS: Performed by: NURSE PRACTITIONER

## 2020-12-05 PROCEDURE — 2580000003 HC RX 258: Performed by: STUDENT IN AN ORGANIZED HEALTH CARE EDUCATION/TRAINING PROGRAM

## 2020-12-05 PROCEDURE — 2500000003 HC RX 250 WO HCPCS: Performed by: GENERAL PRACTICE

## 2020-12-05 PROCEDURE — 85025 COMPLETE CBC W/AUTO DIFF WBC: CPT

## 2020-12-05 PROCEDURE — 82803 BLOOD GASES ANY COMBINATION: CPT

## 2020-12-05 PROCEDURE — 2000000000 HC ICU R&B

## 2020-12-05 PROCEDURE — 84478 ASSAY OF TRIGLYCERIDES: CPT

## 2020-12-05 PROCEDURE — 71045 X-RAY EXAM CHEST 1 VIEW: CPT

## 2020-12-05 PROCEDURE — 37799 UNLISTED PX VASCULAR SURGERY: CPT

## 2020-12-05 PROCEDURE — 6360000002 HC RX W HCPCS: Performed by: NURSE PRACTITIONER

## 2020-12-05 PROCEDURE — 2580000003 HC RX 258: Performed by: GENERAL PRACTICE

## 2020-12-05 PROCEDURE — 82947 ASSAY GLUCOSE BLOOD QUANT: CPT

## 2020-12-05 PROCEDURE — 2700000000 HC OXYGEN THERAPY PER DAY

## 2020-12-05 PROCEDURE — 6360000004 HC RX CONTRAST MEDICATION: Performed by: SURGERY

## 2020-12-05 PROCEDURE — 94761 N-INVAS EAR/PLS OXIMETRY MLT: CPT

## 2020-12-05 PROCEDURE — 83605 ASSAY OF LACTIC ACID: CPT

## 2020-12-05 PROCEDURE — APPNB15 APP NON BILLABLE TIME 0-15 MINS: Performed by: NURSE PRACTITIONER

## 2020-12-05 PROCEDURE — 70498 CT ANGIOGRAPHY NECK: CPT

## 2020-12-05 PROCEDURE — 6370000000 HC RX 637 (ALT 250 FOR IP): Performed by: GENERAL PRACTICE

## 2020-12-05 PROCEDURE — 2500000003 HC RX 250 WO HCPCS: Performed by: STUDENT IN AN ORGANIZED HEALTH CARE EDUCATION/TRAINING PROGRAM

## 2020-12-05 PROCEDURE — 84100 ASSAY OF PHOSPHORUS: CPT

## 2020-12-05 RX ORDER — QUETIAPINE FUMARATE 100 MG/1
100 TABLET, FILM COATED ORAL 2 TIMES DAILY
Status: DISCONTINUED | OUTPATIENT
Start: 2020-12-05 | End: 2020-12-11 | Stop reason: HOSPADM

## 2020-12-05 RX ORDER — HALOPERIDOL 5 MG/ML
5 INJECTION INTRAMUSCULAR
Status: DISCONTINUED | OUTPATIENT
Start: 2020-12-05 | End: 2020-12-05

## 2020-12-05 RX ORDER — OXYCODONE HCL 5 MG/5 ML
5 SOLUTION, ORAL ORAL EVERY 4 HOURS
Status: DISCONTINUED | OUTPATIENT
Start: 2020-12-05 | End: 2020-12-06

## 2020-12-05 RX ADMIN — DEXMEDETOMIDINE HYDROCHLORIDE 1.1 MCG/KG/HR: 400 INJECTION INTRAVENOUS at 13:58

## 2020-12-05 RX ADMIN — IBUPROFEN 400 MG: 200 SUSPENSION ORAL at 10:05

## 2020-12-05 RX ADMIN — ACETAMINOPHEN 1000 MG: 650 SOLUTION ORAL at 21:11

## 2020-12-05 RX ADMIN — OXYCODONE HYDROCHLORIDE 5 MG: 5 SOLUTION ORAL at 06:12

## 2020-12-05 RX ADMIN — IBUPROFEN 400 MG: 200 SUSPENSION ORAL at 06:11

## 2020-12-05 RX ADMIN — OXYCODONE HYDROCHLORIDE 5 MG: 5 SOLUTION ORAL at 17:46

## 2020-12-05 RX ADMIN — IOPAMIDOL 90 ML: 755 INJECTION, SOLUTION INTRAVENOUS at 01:49

## 2020-12-05 RX ADMIN — IBUPROFEN 400 MG: 200 SUSPENSION ORAL at 13:59

## 2020-12-05 RX ADMIN — SODIUM CHLORIDE 3 G: 900 INJECTION INTRAVENOUS at 21:31

## 2020-12-05 RX ADMIN — DOCUSATE SODIUM 50MG AND SENNOSIDES 8.6MG 1 TABLET: 8.6; 5 TABLET, FILM COATED ORAL at 08:43

## 2020-12-05 RX ADMIN — IBUPROFEN 400 MG: 200 SUSPENSION ORAL at 21:10

## 2020-12-05 RX ADMIN — Medication 125 MCG/HR: at 05:05

## 2020-12-05 RX ADMIN — METOCLOPRAMIDE 10 MG: 5 INJECTION, SOLUTION INTRAMUSCULAR; INTRAVENOUS at 00:02

## 2020-12-05 RX ADMIN — METOCLOPRAMIDE 10 MG: 5 INJECTION, SOLUTION INTRAMUSCULAR; INTRAVENOUS at 06:09

## 2020-12-05 RX ADMIN — LEVETIRACETAM 1000 MG: 100 SOLUTION ORAL at 08:43

## 2020-12-05 RX ADMIN — ENOXAPARIN SODIUM 30 MG: 30 INJECTION SUBCUTANEOUS at 08:43

## 2020-12-05 RX ADMIN — QUETIAPINE FUMARATE 100 MG: 100 TABLET ORAL at 08:43

## 2020-12-05 RX ADMIN — ACETAMINOPHEN 1000 MG: 650 SOLUTION ORAL at 13:59

## 2020-12-05 RX ADMIN — SODIUM CHLORIDE 3 G: 900 INJECTION INTRAVENOUS at 08:44

## 2020-12-05 RX ADMIN — SODIUM CHLORIDE 3 G: 900 INJECTION INTRAVENOUS at 17:19

## 2020-12-05 RX ADMIN — POTASSIUM PHOSPHATE, MONOBASIC AND POTASSIUM PHOSPHATE, DIBASIC 20 MMOL: 224; 236 INJECTION, SOLUTION, CONCENTRATE INTRAVENOUS at 08:43

## 2020-12-05 RX ADMIN — SODIUM CHLORIDE 3 G: 900 INJECTION INTRAVENOUS at 03:12

## 2020-12-05 RX ADMIN — LEVETIRACETAM 1000 MG: 100 SOLUTION ORAL at 20:58

## 2020-12-05 RX ADMIN — Medication 6 MCG/MIN: at 17:18

## 2020-12-05 RX ADMIN — DEXMEDETOMIDINE HYDROCHLORIDE 1.3 MCG/KG/HR: 400 INJECTION INTRAVENOUS at 19:24

## 2020-12-05 RX ADMIN — OXYCODONE HYDROCHLORIDE 5 MG: 5 SOLUTION ORAL at 00:02

## 2020-12-05 RX ADMIN — Medication 100 MCG/HR: at 17:46

## 2020-12-05 RX ADMIN — CHLORHEXIDINE GLUCONATE 15 ML: 1.2 RINSE ORAL at 08:48

## 2020-12-05 RX ADMIN — METHOCARBAMOL TABLETS 750 MG: 750 TABLET, COATED ORAL at 19:29

## 2020-12-05 RX ADMIN — POTASSIUM BICARBONATE 20 MEQ: 782 TABLET, EFFERVESCENT ORAL at 09:09

## 2020-12-05 RX ADMIN — METOCLOPRAMIDE 10 MG: 5 INJECTION, SOLUTION INTRAMUSCULAR; INTRAVENOUS at 12:08

## 2020-12-05 RX ADMIN — OXYCODONE HYDROCHLORIDE 5 MG: 5 SOLUTION ORAL at 10:03

## 2020-12-05 RX ADMIN — IBUPROFEN 400 MG: 200 SUSPENSION ORAL at 03:11

## 2020-12-05 RX ADMIN — METHOCARBAMOL TABLETS 750 MG: 750 TABLET, COATED ORAL at 12:08

## 2020-12-05 RX ADMIN — DEXMEDETOMIDINE HYDROCHLORIDE 1.1 MCG/KG/HR: 400 INJECTION INTRAVENOUS at 01:55

## 2020-12-05 RX ADMIN — POLYETHYLENE GLYCOL 3350 17 G: 17 POWDER, FOR SOLUTION ORAL at 10:04

## 2020-12-05 RX ADMIN — DEXMEDETOMIDINE HYDROCHLORIDE 0.9 MCG/KG/HR: 400 INJECTION INTRAVENOUS at 08:33

## 2020-12-05 RX ADMIN — ENOXAPARIN SODIUM 30 MG: 30 INJECTION SUBCUTANEOUS at 20:57

## 2020-12-05 RX ADMIN — Medication 2 MCG/MIN: at 03:30

## 2020-12-05 RX ADMIN — ACETAMINOPHEN 1000 MG: 650 SOLUTION ORAL at 06:11

## 2020-12-05 RX ADMIN — QUETIAPINE FUMARATE 100 MG: 100 TABLET ORAL at 20:59

## 2020-12-05 RX ADMIN — METHOCARBAMOL TABLETS 750 MG: 750 TABLET, COATED ORAL at 03:12

## 2020-12-05 RX ADMIN — OXYCODONE HYDROCHLORIDE 5 MG: 5 SOLUTION ORAL at 21:42

## 2020-12-05 RX ADMIN — CHLORHEXIDINE GLUCONATE 15 ML: 1.2 RINSE ORAL at 21:21

## 2020-12-05 RX ADMIN — IBUPROFEN 400 MG: 200 SUSPENSION ORAL at 17:46

## 2020-12-05 RX ADMIN — Medication 125 MCG/HR: at 12:35

## 2020-12-05 RX ADMIN — OXYCODONE HYDROCHLORIDE 5 MG: 5 SOLUTION ORAL at 13:59

## 2020-12-05 ASSESSMENT — PULMONARY FUNCTION TESTS
PIF_VALUE: 18
PIF_VALUE: 18
PIF_VALUE: 17
PIF_VALUE: 12
PIF_VALUE: 17
PIF_VALUE: 17
PIF_VALUE: 18
PIF_VALUE: 17

## 2020-12-05 NOTE — PLAN OF CARE
Problem: Restraint Use - Nonviolent/Non-Self-Destructive Behavior:  Goal: Absence of restraint indications  Description: Absence of restraint indications  Outcome: Ongoing  Goal: Absence of restraint-related injury  Description: Absence of restraint-related injury  Outcome: Ongoing     Problem: Falls - Risk of:  Goal: Will remain free from falls  Description: Will remain free from falls  Outcome: Ongoing  Goal: Absence of physical injury  Description: Absence of physical injury  Outcome: Ongoing     Problem: Skin Integrity:  Goal: Will show no infection signs and symptoms  Description: Will show no infection signs and symptoms  Outcome: Ongoing  Goal: Absence of new skin breakdown  Description: Absence of new skin breakdown  Outcome: Ongoing     Problem: Anxiety/Stress:  Goal: Level of anxiety will decrease  Description: Level of anxiety will decrease  Outcome: Ongoing     Problem: Cardiac Output - Decreased:  Goal: Hemodynamic stability will improve  Description: Hemodynamic stability will improve  Outcome: Ongoing     Problem: Pain:  Goal: Pain level will decrease  Description: Pain level will decrease  Outcome: Ongoing  Goal: Recognizes and communicates pain  Description: Recognizes and communicates pain  Outcome: Ongoing  Goal: Control of acute pain  Description: Control of acute pain  Outcome: Ongoing  Goal: Control of chronic pain  Description: Control of chronic pain  Outcome: Ongoing     Problem: Tissue Perfusion - Cardiopulmonary, Altered:  Goal: Hemodynamic stability will improve  Description: Hemodynamic stability will improve  Outcome: Ongoing     Problem: Nutrition  Goal: Optimal nutrition therapy  Description: Nutrition Problem #1: Inadequate oral intake  Intervention: Food and/or Nutrient Delivery: Continue NPO, Start Tube Feeding  Nutritional Goals: EN intake to meet % of estimated nutrition needs     Outcome: Ongoing     Problem: OXYGENATION/RESPIRATORY FUNCTION  Goal: Patient will maintain patent airway  Outcome: Ongoing  Goal: Patient will achieve/maintain normal respiratory rate/effort  Description: Respiratory rate and effort will be within normal limits for the patient  Outcome: Ongoing     Problem: MECHANICAL VENTILATION  Goal: Patient will maintain patent airway  Outcome: Ongoing  Goal: Oral health is maintained or improved  Outcome: Ongoing  Goal: Ability to express needs and understand communication  Outcome: Ongoing  Goal: Mobility/activity is maintained at optimum level for patient  Outcome: Ongoing     Problem: SKIN INTEGRITY  Goal: Skin integrity is maintained or improved  Outcome: Ongoing     Problem: PAIN  Goal: STG - Patient will increase activity level  Outcome: Ongoing  Goal: STG - patient verbalizes a reduction in pain level  Outcome: Ongoing     Problem: Confusion - Acute:  Goal: Absence of continued neurological deterioration signs and symptoms  Description: Absence of continued neurological deterioration signs and symptoms  Outcome: Ongoing  Goal: Mental status will be restored to baseline  Description: Mental status will be restored to baseline  Outcome: Ongoing     Problem: Discharge Planning:  Goal: Ability to perform activities of daily living will improve  Description: Ability to perform activities of daily living will improve  Outcome: Ongoing  Goal: Participates in care planning  Description: Participates in care planning  Outcome: Ongoing     Problem: Injury - Risk of, Physical Injury:  Goal: Will remain free from falls  Description: Will remain free from falls  Outcome: Ongoing  Goal: Absence of physical injury  Description: Absence of physical injury  Outcome: Ongoing     Problem: Mood - Altered:  Goal: Mood stable  Description: Mood stable  Outcome: Ongoing  Goal: Absence of abusive behavior  Description: Absence of abusive behavior  Outcome: Ongoing  Goal: Verbalizations of feeling emotionally comfortable while being cared for will increase  Description: Verbalizations

## 2020-12-05 NOTE — PLAN OF CARE
Problem: Restraint Use - Nonviolent/Non-Self-Destructive Behavior:  Goal: Absence of restraint-related injury  Description: Absence of restraint-related injury  12/5/2020 1141 by Yariel Elise RN  Outcome: Ongoing  12/5/2020 0352 by Burgess Mark RN  Outcome: Ongoing     Problem: Falls - Risk of:  Goal: Will remain free from falls  Description: Will remain free from falls  12/5/2020 1141 by Yariel Elise RN  Outcome: Ongoing  12/5/2020 0352 by Burgess Mark RN  Outcome: Ongoing  Goal: Absence of physical injury  Description: Absence of physical injury  12/5/2020 1141 by Yariel Elise RN  Outcome: Ongoing  12/5/2020 0352 by Burgess Mark RN  Outcome: Ongoing     Problem: Skin Integrity:  Goal: Will show no infection signs and symptoms  Description: Will show no infection signs and symptoms  12/5/2020 1141 by Yariel Elise RN  Outcome: Ongoing  12/5/2020 0856 by Venkat Naqvi RCP  Outcome: Ongoing  12/5/2020 0352 by Burgess Mark RN  Outcome: Ongoing  Goal: Absence of new skin breakdown  Description: Absence of new skin breakdown  12/5/2020 1141 by Yariel Elise RN  Outcome: Ongoing  12/5/2020 0856 by Venkat Naqvi RCP  Outcome: Ongoing  12/5/2020 0352 by Burgess Mark RN  Outcome: Ongoing     Problem: Anxiety/Stress:  Goal: Level of anxiety will decrease  Description: Level of anxiety will decrease  12/5/2020 1141 by Yariel Elise RN  Outcome: Ongoing  12/5/2020 0352 by Burgess Mark RN  Outcome: Ongoing     Problem: Cardiac Output - Decreased:  Goal: Hemodynamic stability will improve  Description: Hemodynamic stability will improve  12/5/2020 1141 by Yariel Elise RN  Outcome: Ongoing  12/5/2020 0352 by Burgess Mark RN  Outcome: Ongoing     Problem: Pain:  Goal: Pain level will decrease  Description: Pain level will decrease  12/5/2020 1141 by Yariel Elise RN  Outcome: Ongoing  12/5/2020 0352 by Burgess Mark RN  Outcome: Ongoing  Goal: Recognizes and communicates pain  Description: Recognizes and communicates pain  12/5/2020 1141 by Wolm Barthel, RN  Outcome: Ongoing  12/5/2020 0352 by Arianna Banerjee RN  Outcome: Ongoing  Goal: Control of acute pain  Description: Control of acute pain  12/5/2020 1141 by Wolm Barthel, RN  Outcome: Ongoing  12/5/2020 0352 by Arianna Banerjee RN  Outcome: Ongoing  Goal: Control of chronic pain  Description: Control of chronic pain  12/5/2020 1141 by Wolm Barthel, RN  Outcome: Ongoing  12/5/2020 0352 by Arianna Banerjee RN  Outcome: Ongoing     Problem: Tissue Perfusion - Cardiopulmonary, Altered:  Goal: Hemodynamic stability will improve  Description: Hemodynamic stability will improve  12/5/2020 1141 by Wolm Barthel, RN  Outcome: Ongoing  12/5/2020 0352 by Arianna Banerjee RN  Outcome: Ongoing     Problem: Nutrition  Goal: Optimal nutrition therapy  Description: Nutrition Problem #1: Inadequate oral intake  Intervention: Food and/or Nutrient Delivery: Continue NPO, Start Tube Feeding  Nutritional Goals: EN intake to meet % of estimated nutrition needs     12/5/2020 1141 by Wolm Barthel, RN  Outcome: Ongoing  12/5/2020 0352 by Arianna Banerjee RN  Outcome: Ongoing     Problem: OXYGENATION/RESPIRATORY FUNCTION  Goal: Patient will maintain patent airway  12/5/2020 1141 by Wolm Barthel, RN  Outcome: Ongoing  12/5/2020 0352 by Arianna Banerjee RN  Outcome: Ongoing  Goal: Patient will achieve/maintain normal respiratory rate/effort  Description: Respiratory rate and effort will be within normal limits for the patient  12/5/2020 1141 by Wolm Barthel, RN  Outcome: Ongoing  12/5/2020 0856 by Anisa Martino RCP  Outcome: Ongoing  12/5/2020 0352 by Arianna Banerjee RN  Outcome: Ongoing     Problem: MECHANICAL VENTILATION  Goal: Patient will maintain patent airway  12/5/2020 1141 by Wolm Barthel, RN  Outcome: Ongoing  12/5/2020 0856 by Anisa Martino RCP  Outcome: Ongoing  12/5/2020 0352 by Avril Madsen RN  Outcome: Ongoing  Goal: Oral health is maintained or improved  12/5/2020 1141 by Flor Taylor RN  Outcome: Ongoing  12/5/2020 0856 by Kristi Costa RCP  Outcome: Ongoing  12/5/2020 0352 by Avril Madsen RN  Outcome: Ongoing  Goal: Ability to express needs and understand communication  12/5/2020 1141 by Flor Taylor RN  Outcome: Ongoing  12/5/2020 0856 by Kristi Costa RCP  Outcome: Ongoing  12/5/2020 0352 by Avril Madsen RN  Outcome: Ongoing  Goal: Mobility/activity is maintained at optimum level for patient  12/5/2020 1141 by Flor Taylor RN  Outcome: Ongoing  12/5/2020 0856 by Kristi Costa RCP  Outcome: Ongoing  12/5/2020 0352 by Avril Madsen RN  Outcome: Ongoing  Goal: Tracheostomy will be managed safely  12/5/2020 0856 by Kristi Costa RCP  Outcome: Ongoing     Problem: SKIN INTEGRITY  Goal: Skin integrity is maintained or improved  12/5/2020 1141 by Flor Taylor RN  Outcome: Ongoing  12/5/2020 0856 by Kristi Costa RCP  Outcome: Ongoing  12/5/2020 0352 by Avril Madsen RN  Outcome: Ongoing     Problem: PAIN  Goal: STG - Patient will increase activity level  12/5/2020 1141 by Flor Taylor RN  Outcome: Ongoing  12/5/2020 0352 by Avril Madsen RN  Outcome: Ongoing  Goal: STG - patient verbalizes a reduction in pain level  12/5/2020 1141 by Flor Taylor RN  Outcome: Ongoing  12/5/2020 0352 by Avril Madsen RN  Outcome: Ongoing     Problem: Confusion - Acute:  Goal: Absence of continued neurological deterioration signs and symptoms  Description: Absence of continued neurological deterioration signs and symptoms  12/5/2020 1141 by Flor Taylor RN  Outcome: Ongoing  12/5/2020 0352 by Avril Madsen RN  Outcome: Ongoing  Goal: Mental status will be restored to baseline  Description: Mental status will be restored to baseline  12/5/2020 1141 by Flor Taylor RN  Outcome: Ongoing  12/5/2020 Problem: Sensory Perception - Impaired:  Goal: Demonstrations of improved sensory functioning will increase  Description: Demonstrations of improved sensory functioning will increase  12/5/2020 1141 by Virginia Tyler RN  Outcome: Ongoing  12/5/2020 0352 by Ambrocio Prado RN  Outcome: Ongoing  Goal: Decrease in sensory misperception frequency  Description: Decrease in sensory misperception frequency  12/5/2020 1141 by Virginia Tyler RN  Outcome: Ongoing  12/5/2020 0352 by Ambrocio Prado RN  Outcome: Ongoing  Goal: Able to refrain from responding to false sensory perceptions  Description: Able to refrain from responding to false sensory perceptions  12/5/2020 1141 by Virginia Tyler RN  Outcome: Ongoing  12/5/2020 0352 by Ambrocio Prado RN  Outcome: Ongoing  Goal: Demonstrates accurate environmental perceptions  Description: Demonstrates accurate environmental perceptions  12/5/2020 1141 by Virginia Tyler RN  Outcome: Ongoing  12/5/2020 0352 by Ambrocio Prado RN  Outcome: Ongoing  Goal: Able to distinguish between reality-based and nonreality-based thinking  Description: Able to distinguish between reality-based and nonreality-based thinking  12/5/2020 1141 by Virginia Tyler RN  Outcome: Ongoing  12/5/2020 0352 by Ambrocio Prado RN  Outcome: Ongoing  Goal: Able to interrupt nonreality-based thinking  Description: Able to interrupt nonreality-based thinking  12/5/2020 1141 by Virginia Tyler RN  Outcome: Ongoing  12/5/2020 0352 by Ambrocio Prado RN  Outcome: Ongoing     Problem: Sleep Pattern Disturbance:  Goal: Appears well-rested  Description: Appears well-rested  12/5/2020 1141 by Virginia Tyler RN  Outcome: Ongoing  12/5/2020 0352 by Ambrocio Prado RN  Outcome: Ongoing
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
## 2020-12-05 NOTE — PLAN OF CARE
Problem: Skin Integrity:  Goal: Will show no infection signs and symptoms  Description: Will show no infection signs and symptoms  12/5/2020 0856 by Josh Rodrigez RCP  Outcome: Ongoing  12/5/2020 0352 by David Vincent RN  Outcome: Ongoing  Goal: Absence of new skin breakdown  Description: Absence of new skin breakdown  12/5/2020 0856 by Joshceleste Rodrigez, RCP  Outcome: Ongoing  12/5/2020 0352 by David Vincent RN  Outcome: Ongoing     Problem: OXYGENATION/RESPIRATORY FUNCTION  Goal: Patient will maintain patent airway  12/5/2020 0352 by David Vincent RN  Outcome: Ongoing  Goal: Patient will achieve/maintain normal respiratory rate/effort  Description: Respiratory rate and effort will be within normal limits for the patient  12/5/2020 0856 by Josh Rodrigez RCP  Outcome: Ongoing  12/5/2020 0352 by David Vincent RN  Outcome: Ongoing     Problem: MECHANICAL VENTILATION  Goal: Patient will maintain patent airway  12/5/2020 0856 by Josh Rodrigez RCP  Outcome: Ongoing  12/5/2020 0352 by David Vincent RN  Outcome: Ongoing  Goal: Oral health is maintained or improved  12/5/2020 0856 by Josh Rodrigez RCP  Outcome: Ongoing  12/5/2020 0352 by David Vincent RN  Outcome: Ongoing  Goal: Ability to express needs and understand communication  12/5/2020 0856 by Josh Rodrigez RCP  Outcome: Ongoing  12/5/2020 0352 by David Vincent RN  Outcome: Ongoing  Goal: Mobility/activity is maintained at optimum level for patient  12/5/2020 0856 by Josh Rodrigez RCP  Outcome: Ongoing  12/5/2020 0352 by David Vincent RN  Outcome: Ongoing  Goal: Tracheostomy will be managed safely  Outcome: Ongoing     Problem: SKIN INTEGRITY  Goal: Skin integrity is maintained or improved  12/5/2020 0856 by Josh Rodrigez RCP  Outcome: Ongoing  12/5/2020 0352 by David Vincent RN  Outcome: Ongoing

## 2020-12-05 NOTE — CARE COORDINATION
Dispo planning:     CM to obtain [facility], [facility] choices. PM&R consult to follow pt for potential acute rehab (if can tolerate off the ventilator and medically/hemodynamically stable). Per RN, dad is the contact for updates.
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
## 2020-12-06 ENCOUNTER — APPOINTMENT (OUTPATIENT)
Dept: GENERAL RADIOLOGY | Age: 20
DRG: 004 | End: 2020-12-06
Payer: MEDICAID

## 2020-12-06 LAB
ABSOLUTE EOS #: 0.05 K/UL (ref 0–0.44)
ABSOLUTE IMMATURE GRANULOCYTE: 0.04 K/UL (ref 0–0.3)
ABSOLUTE LYMPH #: 1.52 K/UL (ref 1.1–3.7)
ABSOLUTE MONO #: 0.47 K/UL (ref 0.1–1.4)
ALLEN TEST: ABNORMAL
ANION GAP SERPL CALCULATED.3IONS-SCNC: 9 MMOL/L (ref 9–17)
BASOPHILS # BLD: 0 % (ref 0–2)
BASOPHILS ABSOLUTE: <0.03 K/UL (ref 0–0.2)
BUN BLDV-MCNC: 8 MG/DL (ref 6–20)
BUN/CREAT BLD: ABNORMAL (ref 9–20)
CALCIUM SERPL-MCNC: 7.8 MG/DL (ref 8.6–10.4)
CHLORIDE BLD-SCNC: 114 MMOL/L (ref 98–107)
CO2: 26 MMOL/L (ref 20–31)
CREAT SERPL-MCNC: 0.63 MG/DL (ref 0.7–1.2)
DIFFERENTIAL TYPE: ABNORMAL
EOSINOPHILS RELATIVE PERCENT: 1 % (ref 1–4)
FIO2: 30
GFR AFRICAN AMERICAN: >60 ML/MIN
GFR NON-AFRICAN AMERICAN: >60 ML/MIN
GFR SERPL CREATININE-BSD FRML MDRD: ABNORMAL ML/MIN/{1.73_M2}
GFR SERPL CREATININE-BSD FRML MDRD: ABNORMAL ML/MIN/{1.73_M2}
GLUCOSE BLD-MCNC: 110 MG/DL (ref 74–100)
GLUCOSE BLD-MCNC: 121 MG/DL (ref 70–99)
HCT VFR BLD CALC: 24.4 % (ref 40.7–50.3)
HEMOGLOBIN: 8 G/DL (ref 13–17)
IMMATURE GRANULOCYTES: 1 %
LYMPHOCYTES # BLD: 32 % (ref 25–45)
MAGNESIUM: 1.9 MG/DL (ref 1.6–2.6)
MCH RBC QN AUTO: 30.1 PG (ref 25.2–33.5)
MCHC RBC AUTO-ENTMCNC: 32.8 G/DL (ref 28.4–34.8)
MCV RBC AUTO: 91.7 FL (ref 82.6–102.9)
MODE: ABNORMAL
MONOCYTES # BLD: 10 % (ref 2–8)
NEGATIVE BASE EXCESS, ART: ABNORMAL (ref 0–2)
NRBC AUTOMATED: 0 PER 100 WBC
O2 DEVICE/FLOW/%: ABNORMAL
PATIENT TEMP: 38.6
PDW BLD-RTO: 15.3 % (ref 11.8–14.4)
PHOSPHORUS: 2.3 MG/DL (ref 2.5–4.5)
PLATELET # BLD: ABNORMAL K/UL (ref 138–453)
PLATELET ESTIMATE: ABNORMAL
PLATELET, FLUORESCENCE: 151 K/UL (ref 138–453)
PLATELET, IMMATURE FRACTION: 1.4 % (ref 1.1–10.3)
PMV BLD AUTO: ABNORMAL FL (ref 8.1–13.5)
POC HCO3: 28 MMOL/L (ref 21–28)
POC LACTIC ACID: 0.34 MMOL/L (ref 0.56–1.39)
POC O2 SATURATION: 94 % (ref 94–98)
POC PCO2 TEMP: 46 MM HG
POC PCO2: 42.8 MM HG (ref 35–48)
POC PH TEMP: 7.4
POC PH: 7.42 (ref 7.35–7.45)
POC PO2 TEMP: 78 MM HG
POC PO2: 70.3 MM HG (ref 83–108)
POSITIVE BASE EXCESS, ART: 3 (ref 0–3)
POTASSIUM SERPL-SCNC: 3.2 MMOL/L (ref 3.7–5.3)
RBC # BLD: 2.66 M/UL (ref 4.21–5.77)
RBC # BLD: ABNORMAL 10*6/UL
SAMPLE SITE: ABNORMAL
SEG NEUTROPHILS: 57 % (ref 34–64)
SEGMENTED NEUTROPHILS ABSOLUTE COUNT: 2.74 K/UL (ref 1.5–8.1)
SODIUM BLD-SCNC: 149 MMOL/L (ref 135–144)
TCO2 (CALC), ART: 29 MMOL/L (ref 22–29)
WBC # BLD: 4.8 K/UL (ref 4.5–13.5)
WBC # BLD: ABNORMAL 10*3/UL

## 2020-12-06 PROCEDURE — 6360000002 HC RX W HCPCS: Performed by: STUDENT IN AN ORGANIZED HEALTH CARE EDUCATION/TRAINING PROGRAM

## 2020-12-06 PROCEDURE — 6370000000 HC RX 637 (ALT 250 FOR IP): Performed by: NURSE PRACTITIONER

## 2020-12-06 PROCEDURE — 6370000000 HC RX 637 (ALT 250 FOR IP): Performed by: STUDENT IN AN ORGANIZED HEALTH CARE EDUCATION/TRAINING PROGRAM

## 2020-12-06 PROCEDURE — 6360000002 HC RX W HCPCS: Performed by: NURSE PRACTITIONER

## 2020-12-06 PROCEDURE — 82803 BLOOD GASES ANY COMBINATION: CPT

## 2020-12-06 PROCEDURE — 94770 HC ETCO2 MONITOR DAILY: CPT

## 2020-12-06 PROCEDURE — 2500000003 HC RX 250 WO HCPCS: Performed by: NURSE PRACTITIONER

## 2020-12-06 PROCEDURE — 6370000000 HC RX 637 (ALT 250 FOR IP): Performed by: GENERAL PRACTICE

## 2020-12-06 PROCEDURE — 51701 INSERT BLADDER CATHETER: CPT

## 2020-12-06 PROCEDURE — 2580000003 HC RX 258: Performed by: STUDENT IN AN ORGANIZED HEALTH CARE EDUCATION/TRAINING PROGRAM

## 2020-12-06 PROCEDURE — 2500000003 HC RX 250 WO HCPCS: Performed by: GENERAL PRACTICE

## 2020-12-06 PROCEDURE — 85025 COMPLETE CBC W/AUTO DIFF WBC: CPT

## 2020-12-06 PROCEDURE — 80048 BASIC METABOLIC PNL TOTAL CA: CPT

## 2020-12-06 PROCEDURE — 94003 VENT MGMT INPAT SUBQ DAY: CPT

## 2020-12-06 PROCEDURE — 2700000000 HC OXYGEN THERAPY PER DAY

## 2020-12-06 PROCEDURE — 71045 X-RAY EXAM CHEST 1 VIEW: CPT

## 2020-12-06 PROCEDURE — 94761 N-INVAS EAR/PLS OXIMETRY MLT: CPT

## 2020-12-06 PROCEDURE — APPNB15 APP NON BILLABLE TIME 0-15 MINS: Performed by: NURSE PRACTITIONER

## 2020-12-06 PROCEDURE — 83605 ASSAY OF LACTIC ACID: CPT

## 2020-12-06 PROCEDURE — 83735 ASSAY OF MAGNESIUM: CPT

## 2020-12-06 PROCEDURE — 2000000000 HC ICU R&B

## 2020-12-06 PROCEDURE — 82947 ASSAY GLUCOSE BLOOD QUANT: CPT

## 2020-12-06 PROCEDURE — 2580000003 HC RX 258: Performed by: GENERAL PRACTICE

## 2020-12-06 PROCEDURE — 85055 RETICULATED PLATELET ASSAY: CPT

## 2020-12-06 PROCEDURE — 84100 ASSAY OF PHOSPHORUS: CPT

## 2020-12-06 RX ORDER — OXYCODONE HCL 5 MG/5 ML
5 SOLUTION, ORAL ORAL ONCE
Status: COMPLETED | OUTPATIENT
Start: 2020-12-06 | End: 2020-12-06

## 2020-12-06 RX ORDER — OXYCODONE HCL 5 MG/5 ML
10 SOLUTION, ORAL ORAL EVERY 6 HOURS PRN
Status: DISCONTINUED | OUTPATIENT
Start: 2020-12-06 | End: 2020-12-09

## 2020-12-06 RX ORDER — FUROSEMIDE 10 MG/ML
20 INJECTION INTRAMUSCULAR; INTRAVENOUS ONCE
Status: COMPLETED | OUTPATIENT
Start: 2020-12-06 | End: 2020-12-06

## 2020-12-06 RX ORDER — ACETAMINOPHEN 160 MG/5ML
650 SOLUTION ORAL ONCE
Status: COMPLETED | OUTPATIENT
Start: 2020-12-06 | End: 2020-12-06

## 2020-12-06 RX ORDER — GABAPENTIN 250 MG/5ML
300 SOLUTION ORAL EVERY 8 HOURS SCHEDULED
Status: DISPENSED | OUTPATIENT
Start: 2020-12-06 | End: 2020-12-08

## 2020-12-06 RX ORDER — MAGNESIUM SULFATE IN WATER 40 MG/ML
2 INJECTION, SOLUTION INTRAVENOUS ONCE
Status: COMPLETED | OUTPATIENT
Start: 2020-12-06 | End: 2020-12-06

## 2020-12-06 RX ORDER — FENTANYL CITRATE 50 UG/ML
75 INJECTION, SOLUTION INTRAMUSCULAR; INTRAVENOUS
Status: DISCONTINUED | OUTPATIENT
Start: 2020-12-06 | End: 2020-12-07

## 2020-12-06 RX ADMIN — SODIUM CHLORIDE, PRESERVATIVE FREE 10 ML: 5 INJECTION INTRAVENOUS at 20:26

## 2020-12-06 RX ADMIN — IBUPROFEN 400 MG: 200 SUSPENSION ORAL at 01:58

## 2020-12-06 RX ADMIN — OXYCODONE HYDROCHLORIDE 10 MG: 5 SOLUTION ORAL at 22:14

## 2020-12-06 RX ADMIN — ENOXAPARIN SODIUM 30 MG: 30 INJECTION SUBCUTANEOUS at 09:04

## 2020-12-06 RX ADMIN — DEXMEDETOMIDINE HYDROCHLORIDE 1.3 MCG/KG/HR: 400 INJECTION INTRAVENOUS at 00:18

## 2020-12-06 RX ADMIN — OXYCODONE HYDROCHLORIDE 5 MG: 5 SOLUTION ORAL at 05:56

## 2020-12-06 RX ADMIN — IBUPROFEN 400 MG: 200 SUSPENSION ORAL at 09:43

## 2020-12-06 RX ADMIN — IBUPROFEN 400 MG: 200 SUSPENSION ORAL at 17:52

## 2020-12-06 RX ADMIN — DEXMEDETOMIDINE HYDROCHLORIDE 1.4 MCG/KG/HR: 400 INJECTION INTRAVENOUS at 17:52

## 2020-12-06 RX ADMIN — LEVETIRACETAM 1000 MG: 100 SOLUTION ORAL at 09:04

## 2020-12-06 RX ADMIN — SODIUM CHLORIDE 3 G: 900 INJECTION INTRAVENOUS at 22:14

## 2020-12-06 RX ADMIN — GABAPENTIN 300 MG: 250 SUSPENSION ORAL at 13:36

## 2020-12-06 RX ADMIN — METHOCARBAMOL TABLETS 750 MG: 750 TABLET, COATED ORAL at 03:52

## 2020-12-06 RX ADMIN — QUETIAPINE FUMARATE 100 MG: 100 TABLET ORAL at 09:04

## 2020-12-06 RX ADMIN — OXYCODONE HYDROCHLORIDE 10 MG: 5 SOLUTION ORAL at 15:55

## 2020-12-06 RX ADMIN — SODIUM CHLORIDE 3 G: 900 INJECTION INTRAVENOUS at 09:41

## 2020-12-06 RX ADMIN — FENTANYL CITRATE 75 MCG: 50 INJECTION, SOLUTION INTRAMUSCULAR; INTRAVENOUS at 13:47

## 2020-12-06 RX ADMIN — METHOCARBAMOL TABLETS 750 MG: 750 TABLET, COATED ORAL at 20:28

## 2020-12-06 RX ADMIN — OXYCODONE HYDROCHLORIDE 5 MG: 5 SOLUTION ORAL at 09:40

## 2020-12-06 RX ADMIN — IBUPROFEN 400 MG: 200 SUSPENSION ORAL at 13:35

## 2020-12-06 RX ADMIN — OXYCODONE HYDROCHLORIDE 5 MG: 5 SOLUTION ORAL at 10:31

## 2020-12-06 RX ADMIN — POTASSIUM PHOSPHATE, MONOBASIC AND POTASSIUM PHOSPHATE, DIBASIC 20 MMOL: 224; 236 INJECTION, SOLUTION, CONCENTRATE INTRAVENOUS at 09:33

## 2020-12-06 RX ADMIN — FENTANYL CITRATE 75 MCG: 50 INJECTION, SOLUTION INTRAMUSCULAR; INTRAVENOUS at 16:23

## 2020-12-06 RX ADMIN — IBUPROFEN 400 MG: 200 SUSPENSION ORAL at 22:14

## 2020-12-06 RX ADMIN — FENTANYL CITRATE 75 MCG: 50 INJECTION, SOLUTION INTRAMUSCULAR; INTRAVENOUS at 10:35

## 2020-12-06 RX ADMIN — OXYCODONE HYDROCHLORIDE 5 MG: 5 SOLUTION ORAL at 01:59

## 2020-12-06 RX ADMIN — ACETAMINOPHEN 1000 MG: 650 SOLUTION ORAL at 22:14

## 2020-12-06 RX ADMIN — CHLORHEXIDINE GLUCONATE 15 ML: 1.2 RINSE ORAL at 09:33

## 2020-12-06 RX ADMIN — MAGNESIUM SULFATE 2 G: 2 INJECTION INTRAVENOUS at 10:30

## 2020-12-06 RX ADMIN — METOCLOPRAMIDE 10 MG: 5 INJECTION, SOLUTION INTRAMUSCULAR; INTRAVENOUS at 00:10

## 2020-12-06 RX ADMIN — DEXMEDETOMIDINE HYDROCHLORIDE 1.2 MCG/KG/HR: 400 INJECTION INTRAVENOUS at 22:52

## 2020-12-06 RX ADMIN — ENOXAPARIN SODIUM 30 MG: 30 INJECTION SUBCUTANEOUS at 20:27

## 2020-12-06 RX ADMIN — SODIUM CHLORIDE 3 G: 900 INJECTION INTRAVENOUS at 15:55

## 2020-12-06 RX ADMIN — DEXMEDETOMIDINE HYDROCHLORIDE 1.4 MCG/KG/HR: 400 INJECTION INTRAVENOUS at 09:41

## 2020-12-06 RX ADMIN — ACETAMINOPHEN 650 MG: 650 SOLUTION ORAL at 03:56

## 2020-12-06 RX ADMIN — LEVETIRACETAM 1000 MG: 100 SOLUTION ORAL at 20:27

## 2020-12-06 RX ADMIN — FENTANYL CITRATE 75 MCG: 50 INJECTION, SOLUTION INTRAMUSCULAR; INTRAVENOUS at 11:14

## 2020-12-06 RX ADMIN — DEXMEDETOMIDINE HYDROCHLORIDE 1.4 MCG/KG/HR: 400 INJECTION INTRAVENOUS at 13:44

## 2020-12-06 RX ADMIN — FUROSEMIDE 20 MG: 10 INJECTION, SOLUTION INTRAMUSCULAR; INTRAVENOUS at 10:31

## 2020-12-06 RX ADMIN — DEXMEDETOMIDINE HYDROCHLORIDE 1.3 MCG/KG/HR: 400 INJECTION INTRAVENOUS at 04:58

## 2020-12-06 RX ADMIN — FENTANYL CITRATE 75 MCG: 50 INJECTION, SOLUTION INTRAMUSCULAR; INTRAVENOUS at 17:49

## 2020-12-06 RX ADMIN — Medication 75 MCG/HR: at 04:59

## 2020-12-06 RX ADMIN — IBUPROFEN 400 MG: 200 SUSPENSION ORAL at 05:56

## 2020-12-06 RX ADMIN — METHOCARBAMOL TABLETS 750 MG: 750 TABLET, COATED ORAL at 12:30

## 2020-12-06 RX ADMIN — SODIUM CHLORIDE 3 G: 900 INJECTION INTRAVENOUS at 03:37

## 2020-12-06 RX ADMIN — ACETAMINOPHEN 1000 MG: 650 SOLUTION ORAL at 05:54

## 2020-12-06 RX ADMIN — POTASSIUM BICARBONATE 40 MEQ: 782 TABLET, EFFERVESCENT ORAL at 09:33

## 2020-12-06 RX ADMIN — QUETIAPINE FUMARATE 100 MG: 100 TABLET ORAL at 20:27

## 2020-12-06 RX ADMIN — FENTANYL CITRATE 75 MCG: 50 INJECTION, SOLUTION INTRAMUSCULAR; INTRAVENOUS at 12:30

## 2020-12-06 ASSESSMENT — PULMONARY FUNCTION TESTS
PIF_VALUE: 12
PIF_VALUE: 13
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
## 2020-12-06 NOTE — PLAN OF CARE
Problem: Restraint Use - Nonviolent/Non-Self-Destructive Behavior:  Goal: Absence of restraint indications  Description: Absence of restraint indications  12/6/2020 0116 by Moraima Donohue RN  Outcome: Not Met This Shift     Problem: Restraint Use - Nonviolent/Non-Self-Destructive Behavior:  Goal: Absence of restraint-related injury  Description: Absence of restraint-related injury  12/6/2020 0116 by Moraima Donohue RN  Outcome: Met This Shift     Problem: Falls - Risk of:  Goal: Will remain free from falls  Description: Will remain free from falls  12/6/2020 0116 by Moraima Donohue RN  Outcome: Ongoing     Problem: Skin Integrity:  Goal: Absence of new skin breakdown  Description: Absence of new skin breakdown  12/6/2020 0116 by Moraima Donohue RN  Outcome: Ongoing     Problem: Anxiety/Stress:  Goal: Level of anxiety will decrease  Description: Level of anxiety will decrease  12/6/2020 0116 by Moraima Donohue RN  Outcome: Ongoing     Problem: Cardiac Output - Decreased:  Goal: Hemodynamic stability will improve  Description: Hemodynamic stability will improve  12/6/2020 0116 by Moraima Donohue RN  Outcome: Ongoing     Problem: Pain:  Goal: Pain level will decrease  Description: Pain level will decrease  12/6/2020 0116 by Moraima Donohue RN  Outcome: Ongoing     Problem: Tissue Perfusion - Cardiopulmonary, Altered:  Goal: Hemodynamic stability will improve  Description: Hemodynamic stability will improve  12/6/2020 0116 by Moraima Donohue RN  Outcome: Ongoing     Problem: OXYGENATION/RESPIRATORY FUNCTION  Goal: Patient will maintain patent airway  12/6/2020 0116 by Moraima Donohue RN  Outcome: Ongoing     Problem: MECHANICAL VENTILATION  Goal: Oral health is maintained or improved  12/6/2020 0116 by Moraima Donohue RN  Outcome: Ongoing     Problem: SKIN INTEGRITY  Goal: Skin integrity is maintained or improved  12/6/2020 0116 by Moraima Donohue RN  Outcome: Ongoing     Problem: Injury - Risk of, Physical Injury:  Goal: Will remain free from falls  Description: Will remain free from falls  12/6/2020 0116 by Alethea Sanchez RN  Outcome: Ongoing

## 2020-12-06 NOTE — PLAN OF CARE
Problem: Falls - Risk of:  Goal: Will remain free from falls  Description: Will remain free from falls  Outcome: Ongoing  Goal: Absence of physical injury  Description: Absence of physical injury  Outcome: Ongoing     Problem: Skin Integrity:  Goal: Will show no infection signs and symptoms  Description: Will show no infection signs and symptoms  Outcome: Ongoing  Goal: Absence of new skin breakdown  Description: Absence of new skin breakdown  Outcome: Ongoing     Problem: Anxiety/Stress:  Goal: Level of anxiety will decrease  Description: Level of anxiety will decrease  Outcome: Ongoing     Problem: Cardiac Output - Decreased:  Goal: Hemodynamic stability will improve  Description: Hemodynamic stability will improve  Outcome: Ongoing     Problem: Pain:  Goal: Pain level will decrease  Description: Pain level will decrease  Outcome: Ongoing  Goal: Recognizes and communicates pain  Description: Recognizes and communicates pain  Outcome: Ongoing  Goal: Control of acute pain  Description: Control of acute pain  Outcome: Ongoing  Goal: Control of chronic pain  Description: Control of chronic pain  Outcome: Ongoing     Problem: Tissue Perfusion - Cardiopulmonary, Altered:  Goal: Hemodynamic stability will improve  Description: Hemodynamic stability will improve  Outcome: Ongoing     Problem: Nutrition  Goal: Optimal nutrition therapy  Description: Nutrition Problem #1: Inadequate oral intake  Intervention: Food and/or Nutrient Delivery: Continue NPO, Start Tube Feeding  Nutritional Goals: EN intake to meet % of estimated nutrition needs     Outcome: Ongoing     Problem: OXYGENATION/RESPIRATORY FUNCTION  Goal: Patient will maintain patent airway  Outcome: Ongoing  Goal: Patient will achieve/maintain normal respiratory rate/effort  Description: Respiratory rate and effort will be within normal limits for the patient  Outcome: Ongoing     Problem: MECHANICAL VENTILATION  Goal: Patient will maintain patent airway  Outcome: Ongoing  Goal: Oral health is maintained or improved  Outcome: Ongoing  Goal: Ability to express needs and understand communication  Outcome: Ongoing  Goal: Mobility/activity is maintained at optimum level for patient  Outcome: Ongoing     Problem: SKIN INTEGRITY  Goal: Skin integrity is maintained or improved  Outcome: Ongoing     Problem: PAIN  Goal: STG - Patient will increase activity level  Outcome: Ongoing  Goal: STG - patient verbalizes a reduction in pain level  Outcome: Ongoing     Problem: Confusion - Acute:  Goal: Absence of continued neurological deterioration signs and symptoms  Description: Absence of continued neurological deterioration signs and symptoms  Outcome: Ongoing  Goal: Mental status will be restored to baseline  Description: Mental status will be restored to baseline  Outcome: Ongoing     Problem: Discharge Planning:  Goal: Ability to perform activities of daily living will improve  Description: Ability to perform activities of daily living will improve  Outcome: Ongoing  Goal: Participates in care planning  Description: Participates in care planning  Outcome: Ongoing     Problem: Injury - Risk of, Physical Injury:  Goal: Will remain free from falls  Description: Will remain free from falls  Outcome: Ongoing  Goal: Absence of physical injury  Description: Absence of physical injury  Outcome: Ongoing     Problem: Mood - Altered:  Goal: Mood stable  Description: Mood stable  Outcome: Ongoing  Goal: Absence of abusive behavior  Description: Absence of abusive behavior  Outcome: Ongoing  Goal: Verbalizations of feeling emotionally comfortable while being cared for will increase  Description: Verbalizations of feeling emotionally comfortable while being cared for will increase  Outcome: Ongoing     Problem: Psychomotor Activity - Altered:  Goal: Absence of psychomotor disturbance signs and symptoms  Description: Absence of psychomotor disturbance signs and symptoms  Outcome: Ongoing     Problem: Sensory Perception - Impaired:  Goal: Demonstrations of improved sensory functioning will increase  Description: Demonstrations of improved sensory functioning will increase  Outcome: Ongoing  Goal: Decrease in sensory misperception frequency  Description: Decrease in sensory misperception frequency  Outcome: Ongoing  Goal: Able to refrain from responding to false sensory perceptions  Description: Able to refrain from responding to false sensory perceptions  Outcome: Ongoing  Goal: Demonstrates accurate environmental perceptions  Description: Demonstrates accurate environmental perceptions  Outcome: Ongoing  Goal: Able to distinguish between reality-based and nonreality-based thinking  Description: Able to distinguish between reality-based and nonreality-based thinking  Outcome: Ongoing  Goal: Able to interrupt nonreality-based thinking  Description: Able to interrupt nonreality-based thinking  Outcome: Ongoing     Problem: Sleep Pattern Disturbance:  Goal: Appears well-rested  Description: Appears well-rested  Outcome: Ongoing

## 2020-12-07 ENCOUNTER — APPOINTMENT (OUTPATIENT)
Dept: GENERAL RADIOLOGY | Age: 20
DRG: 004 | End: 2020-12-07
Payer: MEDICAID

## 2020-12-07 PROBLEM — D62 ACUTE BLOOD LOSS ANEMIA: Status: RESOLVED | Noted: 2020-12-02 | Resolved: 2020-12-07

## 2020-12-07 PROBLEM — I60.9 SAH (SUBARACHNOID HEMORRHAGE) (HCC): Status: RESOLVED | Noted: 2020-12-02 | Resolved: 2020-12-07

## 2020-12-07 LAB
ALLEN TEST: ABNORMAL
ANION GAP SERPL CALCULATED.3IONS-SCNC: 10 MMOL/L (ref 9–17)
BUN BLDV-MCNC: 11 MG/DL (ref 6–20)
BUN/CREAT BLD: ABNORMAL (ref 9–20)
CALCIUM SERPL-MCNC: 8.2 MG/DL (ref 8.6–10.4)
CHLORIDE BLD-SCNC: 108 MMOL/L (ref 98–107)
CO2: 26 MMOL/L (ref 20–31)
CREAT SERPL-MCNC: 0.55 MG/DL (ref 0.7–1.2)
FIO2: 100
GFR AFRICAN AMERICAN: >60 ML/MIN
GFR NON-AFRICAN AMERICAN: >60 ML/MIN
GFR SERPL CREATININE-BSD FRML MDRD: ABNORMAL ML/MIN/{1.73_M2}
GFR SERPL CREATININE-BSD FRML MDRD: ABNORMAL ML/MIN/{1.73_M2}
GLUCOSE BLD-MCNC: 105 MG/DL (ref 70–99)
MODE: ABNORMAL
NEGATIVE BASE EXCESS, ART: ABNORMAL (ref 0–2)
O2 DEVICE/FLOW/%: ABNORMAL
PATIENT TEMP: ABNORMAL
PHOSPHORUS: 2.8 MG/DL (ref 2.5–4.5)
POC HCO3: 27 MMOL/L (ref 21–28)
POC LACTIC ACID: 1.1 MMOL/L (ref 0.56–1.39)
POC O2 SATURATION: 94 % (ref 94–98)
POC PCO2 TEMP: ABNORMAL MM HG
POC PCO2: 38.5 MM HG (ref 35–48)
POC PH TEMP: ABNORMAL
POC PH: 7.45 (ref 7.35–7.45)
POC PO2 TEMP: ABNORMAL MM HG
POC PO2: 66.5 MM HG (ref 83–108)
POSITIVE BASE EXCESS, ART: 3 (ref 0–3)
POTASSIUM SERPL-SCNC: 3.6 MMOL/L (ref 3.7–5.3)
SAMPLE SITE: ABNORMAL
SODIUM BLD-SCNC: 144 MMOL/L (ref 135–144)
TCO2 (CALC), ART: 28 MMOL/L (ref 22–29)

## 2020-12-07 PROCEDURE — 2580000003 HC RX 258: Performed by: STUDENT IN AN ORGANIZED HEALTH CARE EDUCATION/TRAINING PROGRAM

## 2020-12-07 PROCEDURE — 94640 AIRWAY INHALATION TREATMENT: CPT

## 2020-12-07 PROCEDURE — 99253 IP/OBS CNSLTJ NEW/EST LOW 45: CPT | Performed by: PHYSICAL MEDICINE & REHABILITATION

## 2020-12-07 PROCEDURE — 6360000002 HC RX W HCPCS: Performed by: GENERAL PRACTICE

## 2020-12-07 PROCEDURE — 2500000003 HC RX 250 WO HCPCS: Performed by: NURSE PRACTITIONER

## 2020-12-07 PROCEDURE — 83605 ASSAY OF LACTIC ACID: CPT

## 2020-12-07 PROCEDURE — 6360000002 HC RX W HCPCS: Performed by: NURSE PRACTITIONER

## 2020-12-07 PROCEDURE — 51701 INSERT BLADDER CATHETER: CPT

## 2020-12-07 PROCEDURE — 6370000000 HC RX 637 (ALT 250 FOR IP): Performed by: GENERAL PRACTICE

## 2020-12-07 PROCEDURE — 94761 N-INVAS EAR/PLS OXIMETRY MLT: CPT

## 2020-12-07 PROCEDURE — 6370000000 HC RX 637 (ALT 250 FOR IP): Performed by: NURSE PRACTITIONER

## 2020-12-07 PROCEDURE — 82803 BLOOD GASES ANY COMBINATION: CPT

## 2020-12-07 PROCEDURE — 97167 OT EVAL HIGH COMPLEX 60 MIN: CPT

## 2020-12-07 PROCEDURE — 36600 WITHDRAWAL OF ARTERIAL BLOOD: CPT

## 2020-12-07 PROCEDURE — APPSS15 APP SPLIT SHARED TIME 0-15 MINUTES: Performed by: NURSE PRACTITIONER

## 2020-12-07 PROCEDURE — 6370000000 HC RX 637 (ALT 250 FOR IP): Performed by: STUDENT IN AN ORGANIZED HEALTH CARE EDUCATION/TRAINING PROGRAM

## 2020-12-07 PROCEDURE — 2700000000 HC OXYGEN THERAPY PER DAY

## 2020-12-07 PROCEDURE — 80048 BASIC METABOLIC PNL TOTAL CA: CPT

## 2020-12-07 PROCEDURE — 6360000002 HC RX W HCPCS: Performed by: STUDENT IN AN ORGANIZED HEALTH CARE EDUCATION/TRAINING PROGRAM

## 2020-12-07 PROCEDURE — 2000000000 HC ICU R&B

## 2020-12-07 PROCEDURE — 71045 X-RAY EXAM CHEST 1 VIEW: CPT

## 2020-12-07 PROCEDURE — 97164 PT RE-EVAL EST PLAN CARE: CPT

## 2020-12-07 PROCEDURE — 97535 SELF CARE MNGMENT TRAINING: CPT

## 2020-12-07 PROCEDURE — 97530 THERAPEUTIC ACTIVITIES: CPT

## 2020-12-07 PROCEDURE — 94003 VENT MGMT INPAT SUBQ DAY: CPT

## 2020-12-07 PROCEDURE — 84100 ASSAY OF PHOSPHORUS: CPT

## 2020-12-07 PROCEDURE — 36415 COLL VENOUS BLD VENIPUNCTURE: CPT

## 2020-12-07 PROCEDURE — 94770 HC ETCO2 MONITOR DAILY: CPT

## 2020-12-07 RX ORDER — HALOPERIDOL 5 MG/ML
5 INJECTION INTRAMUSCULAR EVERY 4 HOURS PRN
Status: DISCONTINUED | OUTPATIENT
Start: 2020-12-07 | End: 2020-12-09

## 2020-12-07 RX ORDER — OFLOXACIN 3 MG/ML
1 SOLUTION/ DROPS OPHTHALMIC 3 TIMES DAILY
Status: DISCONTINUED | OUTPATIENT
Start: 2020-12-07 | End: 2020-12-11 | Stop reason: HOSPADM

## 2020-12-07 RX ORDER — AMOXICILLIN AND CLAVULANATE POTASSIUM 875; 125 MG/1; MG/1
1 TABLET, FILM COATED ORAL EVERY 12 HOURS SCHEDULED
Status: DISCONTINUED | OUTPATIENT
Start: 2020-12-07 | End: 2020-12-08

## 2020-12-07 RX ORDER — ALBUTEROL SULFATE 2.5 MG/3ML
2.5 SOLUTION RESPIRATORY (INHALATION) EVERY 4 HOURS PRN
Status: DISCONTINUED | OUTPATIENT
Start: 2020-12-07 | End: 2020-12-11 | Stop reason: HOSPADM

## 2020-12-07 RX ADMIN — METHOCARBAMOL TABLETS 750 MG: 750 TABLET, COATED ORAL at 04:20

## 2020-12-07 RX ADMIN — DOCUSATE SODIUM 50MG AND SENNOSIDES 8.6MG 1 TABLET: 8.6; 5 TABLET, FILM COATED ORAL at 20:22

## 2020-12-07 RX ADMIN — IBUPROFEN 400 MG: 200 SUSPENSION ORAL at 18:30

## 2020-12-07 RX ADMIN — CHLORHEXIDINE GLUCONATE 15 ML: 1.2 RINSE ORAL at 20:21

## 2020-12-07 RX ADMIN — ACETAMINOPHEN 1000 MG: 650 SOLUTION ORAL at 06:37

## 2020-12-07 RX ADMIN — IBUPROFEN 400 MG: 200 SUSPENSION ORAL at 11:43

## 2020-12-07 RX ADMIN — ALBUTEROL SULFATE 2.5 MG: 5 SOLUTION RESPIRATORY (INHALATION) at 18:33

## 2020-12-07 RX ADMIN — OXYCODONE HYDROCHLORIDE 10 MG: 5 SOLUTION ORAL at 14:15

## 2020-12-07 RX ADMIN — AMOXICILLIN AND CLAVULANATE POTASSIUM 1 TABLET: 875; 125 TABLET, FILM COATED ORAL at 11:43

## 2020-12-07 RX ADMIN — OFLOXACIN 1 DROP: 3 SOLUTION OPHTHALMIC at 20:23

## 2020-12-07 RX ADMIN — ENOXAPARIN SODIUM 30 MG: 30 INJECTION SUBCUTANEOUS at 07:49

## 2020-12-07 RX ADMIN — LEVETIRACETAM 1000 MG: 100 SOLUTION ORAL at 07:48

## 2020-12-07 RX ADMIN — IBUPROFEN 400 MG: 200 SUSPENSION ORAL at 01:45

## 2020-12-07 RX ADMIN — FENTANYL CITRATE 75 MCG: 50 INJECTION, SOLUTION INTRAMUSCULAR; INTRAVENOUS at 07:47

## 2020-12-07 RX ADMIN — FENTANYL CITRATE 75 MCG: 50 INJECTION, SOLUTION INTRAMUSCULAR; INTRAVENOUS at 01:37

## 2020-12-07 RX ADMIN — FENTANYL CITRATE 75 MCG: 50 INJECTION, SOLUTION INTRAMUSCULAR; INTRAVENOUS at 03:08

## 2020-12-07 RX ADMIN — POTASSIUM BICARBONATE 40 MEQ: 782 TABLET, EFFERVESCENT ORAL at 07:50

## 2020-12-07 RX ADMIN — SODIUM CHLORIDE 3 G: 900 INJECTION INTRAVENOUS at 04:20

## 2020-12-07 RX ADMIN — DEXMEDETOMIDINE HYDROCHLORIDE 0.7 MCG/KG/HR: 400 INJECTION INTRAVENOUS at 10:43

## 2020-12-07 RX ADMIN — IBUPROFEN 400 MG: 200 SUSPENSION ORAL at 06:36

## 2020-12-07 RX ADMIN — DEXMEDETOMIDINE HYDROCHLORIDE 0.3 MCG/KG/HR: 400 INJECTION INTRAVENOUS at 22:14

## 2020-12-07 RX ADMIN — QUETIAPINE FUMARATE 100 MG: 100 TABLET ORAL at 07:50

## 2020-12-07 RX ADMIN — METHOCARBAMOL TABLETS 750 MG: 750 TABLET, COATED ORAL at 11:43

## 2020-12-07 RX ADMIN — DEXMEDETOMIDINE HYDROCHLORIDE 0.9 MCG/KG/HR: 400 INJECTION INTRAVENOUS at 04:20

## 2020-12-07 RX ADMIN — SODIUM CHLORIDE, PRESERVATIVE FREE 10 ML: 5 INJECTION INTRAVENOUS at 20:22

## 2020-12-07 RX ADMIN — OXYCODONE HYDROCHLORIDE 10 MG: 5 SOLUTION ORAL at 20:21

## 2020-12-07 RX ADMIN — SODIUM CHLORIDE, PRESERVATIVE FREE 10 ML: 5 INJECTION INTRAVENOUS at 07:55

## 2020-12-07 RX ADMIN — ACETAMINOPHEN 1000 MG: 650 SOLUTION ORAL at 14:14

## 2020-12-07 RX ADMIN — OFLOXACIN 1 DROP: 3 SOLUTION OPHTHALMIC at 18:30

## 2020-12-07 RX ADMIN — AMOXICILLIN AND CLAVULANATE POTASSIUM 1 TABLET: 875; 125 TABLET, FILM COATED ORAL at 20:22

## 2020-12-07 RX ADMIN — LEVETIRACETAM 1000 MG: 100 SOLUTION ORAL at 20:22

## 2020-12-07 RX ADMIN — OXYCODONE HYDROCHLORIDE 10 MG: 5 SOLUTION ORAL at 07:50

## 2020-12-07 RX ADMIN — FENTANYL CITRATE 75 MCG: 50 INJECTION, SOLUTION INTRAMUSCULAR; INTRAVENOUS at 10:36

## 2020-12-07 RX ADMIN — METHOCARBAMOL TABLETS 750 MG: 750 TABLET, COATED ORAL at 20:22

## 2020-12-07 RX ADMIN — GABAPENTIN 300 MG: 250 SUSPENSION ORAL at 14:15

## 2020-12-07 RX ADMIN — ENOXAPARIN SODIUM 30 MG: 30 INJECTION SUBCUTANEOUS at 20:21

## 2020-12-07 RX ADMIN — QUETIAPINE FUMARATE 100 MG: 100 TABLET ORAL at 22:11

## 2020-12-07 RX ADMIN — IBUPROFEN 400 MG: 200 SUSPENSION ORAL at 22:11

## 2020-12-07 RX ADMIN — CHLORHEXIDINE GLUCONATE 15 ML: 1.2 RINSE ORAL at 11:55

## 2020-12-07 ASSESSMENT — PULMONARY FUNCTION TESTS
PIF_VALUE: 12
PIF_VALUE: 13
PIF_VALUE: 14
PIF_VALUE: 12
PIF_VALUE: 15

## 2020-12-07 ASSESSMENT — PAIN SCALES - WONG BAKER: WONGBAKER_NUMERICALRESPONSE: 0
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
## 2020-12-07 NOTE — PROGRESS NOTES
The transport originated from [facility]. Pt. was transported to [facility]. Assisting with the transport was DESTINI Wright. Appropriate devices were applied to monitor the patient's condition during transport. Patient transported  via 40% O2 via venturi t-piece. Patient tolerated well.         art Evangelista  5:53 AM
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
## 2020-12-07 NOTE — CARE COORDINATION
Called patient's father, left VM. 1100 called patient's GF, left VM    1310 patient's father called and stated that he is currently at work and unable to speak. He stated that I can speak to patient's girlfriend, Selene Payne. [address] patient's girlfriend, Selene Payne, prompted to leave VM. Left VM. 1705  Case Management Initial Discharge Plan  Ruchi Ask,             Met with:patient and patient's girl friend to discuss discharge plans. Information verified: address, contacts, phone number, , insurance Yes    Emergency Contact/Next of Kin name & number:   Barb Sanchez  Girlfriend [phone]         PCP: No primary care provider on file. Date of last visit: n/a     [place]    Discharge Planning    Living Arrangements:      Support Systems:       Patient to stay with girlfriend at [place] [id number]. [address] [id number]    Patient able to perform ADL's:Independent    Current Services (outpatient & in home) n/a   DME equipment: n/a   DME provider: n/a    Receiving oral anticoagulation therapy? No    If indicated:   Physician managing anticoagulation treatment: n/a  Where does patient obtain lab work for ATC treatment? n/a      Potential Assistance Needed:       Patient agreeable to home care: Yes  Freedom of choice provided:  yes    Prior SNF/Rehab Placement and Facility:    Agreeable to SNF/Rehab: Yes  [place] of choice provided: yes     Evaluation: yes    Expected Discharge date:       Patient expects to be discharged to: Follow Up Appointment: Best Day/ Time:      Transportation provider: [facility]  Transportation arrangements needed for discharge: Yes    Readmission Risk              Risk of Unplanned Readmission:        11             Does patient have a readmission risk score greater than 14?: No  If yes, follow-up appointment must be made within 7 days of discharge.      Goals of Care:       Discharge Plan: Provided [facility] list at patient's bedside and explained what LTACH level of care. Awaiting choice.            Electronically signed by Enedina Sepulveda RN on 12/7/20 at 5:04 PM EST

## 2020-12-07 NOTE — PROGRESS NOTES
Pt educated on c-spine precautions, in response pt used his left hand to raise his right middle finger

## 2020-12-07 NOTE — PLAN OF CARE
Problem: OXYGENATION/RESPIRATORY FUNCTION  Goal: Patient will maintain patent airway  12/6/2020 1937 by Geremias Morel RCP  Outcome: Ongoing     Problem: OXYGENATION/RESPIRATORY FUNCTION  Goal: Patient will achieve/maintain normal respiratory rate/effort  Description: Respiratory rate and effort will be within normal limits for the patient  12/6/2020 1937 by Geremias Morel RCP  Outcome: Ongoing     Problem: MECHANICAL VENTILATION  Goal: Patient will maintain patent airway  12/6/2020 1937 by Geremias Morel RCP  Outcome: Ongoing     Problem: MECHANICAL VENTILATION  Goal: Oral health is maintained or improved  12/6/2020 1937 by Geremias Morel RCP  Outcome: Ongoing     Problem: MECHANICAL VENTILATION  Goal: Ability to express needs and understand communication  12/6/2020 1937 by Geremias Morel RCP  Outcome: Ongoing     Problem: MECHANICAL VENTILATION  Goal: Mobility/activity is maintained at optimum level for patient  12/6/2020 1937 by Geremias Morel RCP  Outcome: Ongoing     Problem: MECHANICAL VENTILATION  Goal: Tracheostomy will be managed safely  Outcome: Ongoing     Problem: SKIN INTEGRITY  Goal: Skin integrity is maintained or improved  12/6/2020 1937 by Geremias Morel RCP  Outcome: Ongoing

## 2020-12-07 NOTE — PLAN OF CARE
Problem: OXYGENATION/RESPIRATORY FUNCTION  Goal: Patient will maintain patent airway  12/7/2020 1041 by Elle Schmidt RCP  Outcome: Ongoing  12/7/2020 0630 by Irene Mcburney, RN  Outcome: Ongoing     Problem: OXYGENATION/RESPIRATORY FUNCTION  Goal: Patient will achieve/maintain normal respiratory rate/effort  Description: Respiratory rate and effort will be within normal limits for the patient  12/7/2020 1041 by Elle Schmidt RCP  Outcome: Ongoing  12/7/2020 0630 by Irene Mcburney, RN  Outcome: Ongoing     Problem: MECHANICAL VENTILATION  Goal: Patient will maintain patent airway  12/7/2020 1041 by Elle Schmidt RCP  Outcome: Ongoing  12/7/2020 0630 by Irene Mcburney, RN  Outcome: Ongoing     Problem: MECHANICAL VENTILATION  Goal: Oral health is maintained or improved  12/7/2020 1041 by Elle Schmidt RCP  Outcome: Ongoing  12/7/2020 0630 by Irene Mcburney, RN  Outcome: Ongoing     Problem: MECHANICAL VENTILATION  Goal: Ability to express needs and understand communication  12/7/2020 1041 by Elle Schmidt RCP  Outcome: Ongoing  12/7/2020 0630 by Irene Mcburney, RN  Outcome: Ongoing     Problem: MECHANICAL VENTILATION  Goal: Mobility/activity is maintained at optimum level for patient  12/7/2020 1041 by Elle Schmidt RCP  Outcome: Ongoing  12/7/2020 0630 by Irene Mcburney, RN  Outcome: Ongoing     Problem: MECHANICAL VENTILATION  Goal: Tracheostomy will be managed safely  Outcome: Ongoing     Problem: SKIN INTEGRITY  Goal: Skin integrity is maintained or improved  12/7/2020 1041 by Elle Schmidt RCP  Outcome: Ongoing  12/7/2020 0630 by Irene Mcburney, RN  Outcome: Ongoing
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
## 2020-12-07 NOTE — PROGRESS NOTES
session. Ambulation  Ambulation?: No  Stairs/Curb  Stairs?: No     Balance  Posture: Poor  Sitting - Static: Poor  Sitting - Dynamic: Poor  Comments: Pt required maxA sitting EOB for forward trunk support. Pt able to use R UE to intermittently help support self by pushing elbow into an elevated head bed. Pt sat EOB 4 minutes. Plan   Plan  Times per week: 5-6x/week  Current Treatment Recommendations: ROM, Strengthening, Functional Mobility Training, Positioning, Safety Education & Training, Patient/Caregiver Education & Training, Transfer Training  Safety Devices  Type of devices: Nurse notified, All fall risk precautions in place, Call light within reach, Left in bed  Restraints  Initially in place: No    AM-PAC Score     AM-PAC Inpatient Mobility without Stair Climbing Raw Score : 5 (12/07/20 1101)  AM-PAC Inpatient without Stair Climbing T-Scale Score : 23.59 (12/07/20 1101)  Mobility Inpatient CMS 0-100% Score: 100 (12/07/20 1101)  Mobility Inpatient without Stair CMS G-Code Modifier : CN (12/07/20 1101)       Goals  Short term goals  Time Frame for Short term goals: 14 visits  Short term goal 1: Pt will complete bed mobility with modAx2  Short term goal 2: Pt will sit EOB 10 minutes with modA and UE support  Short term goal 3: PROM to prevent contractures         Therapy Time   Individual Concurrent Group Co-treatment   Time In 0950         Time Out 1025         Minutes 35         Timed Code Treatment Minutes: 8 Minutes       Tawnya Wickenheiser    This treatment/evaluation completed by signing SPT. Signing PT agrees with treatment and documentation.

## 2020-12-07 NOTE — PLAN OF CARE
Problem: Falls - Risk of:  Goal: Will remain free from falls  Description: Will remain free from falls  12/7/2020 0630 by Mauricio Fregoso RN  Outcome: Ongoing  12/6/2020 1819 by Ericka Hu RN  Outcome: Ongoing  Goal: Absence of physical injury  Description: Absence of physical injury  12/7/2020 0630 by Mauricio Fregoso RN  Outcome: Ongoing  12/6/2020 1819 by Ericka Hu RN  Outcome: Ongoing     Problem: Skin Integrity:  Goal: Will show no infection signs and symptoms  Description: Will show no infection signs and symptoms  12/7/2020 0630 by Mauricio Fregoso RN  Outcome: Ongoing  12/6/2020 1819 by Ericka Hu RN  Outcome: Ongoing  Goal: Absence of new skin breakdown  Description: Absence of new skin breakdown  12/7/2020 0630 by Mauricio Fregoso RN  Outcome: Ongoing  12/6/2020 1819 by Ericka Hu RN  Outcome: Ongoing     Problem: Anxiety/Stress:  Goal: Level of anxiety will decrease  Description: Level of anxiety will decrease  12/7/2020 0630 by Maurciio Fregoso RN  Outcome: Ongoing  12/6/2020 1819 by Ericka Hu RN  Outcome: Ongoing     Problem: Cardiac Output - Decreased:  Goal: Hemodynamic stability will improve  Description: Hemodynamic stability will improve  12/7/2020 0630 by Mauricio Fregoso RN  Outcome: Ongoing  12/6/2020 1819 by Ericka Hu RN  Outcome: Ongoing     Problem: Pain:  Goal: Pain level will decrease  Description: Pain level will decrease  12/7/2020 0630 by Mauricio Fregoso RN  Outcome: Ongoing  12/6/2020 1819 by Ericka Hu RN  Outcome: Ongoing  Goal: Recognizes and communicates pain  Description: Recognizes and communicates pain  12/7/2020 0630 by Mauricio Fregoso RN  Outcome: Ongoing  12/6/2020 1819 by Ericka Hu RN  Outcome: Ongoing  Goal: Control of acute pain  Description: Control of acute pain  12/7/2020 0630 by Mauricio Fregoso RN  Outcome: Ongoing  12/6/2020 1819 by Ericka Hu RN  Outcome: Ongoing  Goal: Control of chronic pain  Description: Control of chronic pain  12/7/2020 0630 by Ashish Camacho RN  Outcome: Ongoing  12/6/2020 1819 by Ramy Stein RN  Outcome: Ongoing     Problem: Tissue Perfusion - Cardiopulmonary, Altered:  Goal: Hemodynamic stability will improve  Description: Hemodynamic stability will improve  12/7/2020 0630 by Ashish Camacho RN  Outcome: Ongoing  12/6/2020 1819 by Ramy Stein RN  Outcome: Ongoing     Problem: Nutrition  Goal: Optimal nutrition therapy  Description: Nutrition Problem #1: Inadequate oral intake  Intervention: Food and/or Nutrient Delivery: Continue NPO, Start Tube Feeding  Nutritional Goals: EN intake to meet % of estimated nutrition needs     12/7/2020 0630 by Ashish Camacho RN  Outcome: Ongoing  12/6/2020 1819 by Ramy Stein RN  Outcome: Ongoing     Problem: OXYGENATION/RESPIRATORY FUNCTION  Goal: Patient will maintain patent airway  12/7/2020 0630 by Ashish Camacho RN  Outcome: Ongoing  12/6/2020 1937 by Geremias Morel RCP  Outcome: Ongoing  12/6/2020 1819 by Ramy Stein RN  Outcome: Ongoing  Goal: Patient will achieve/maintain normal respiratory rate/effort  Description: Respiratory rate and effort will be within normal limits for the patient  12/7/2020 0630 by Ashish Camacho RN  Outcome: Ongoing  12/6/2020 1937 by Geremias Morel RCP  Outcome: Ongoing  12/6/2020 1819 by Ramy Stein RN  Outcome: Ongoing     Problem: MECHANICAL VENTILATION  Goal: Patient will maintain patent airway  12/7/2020 0630 by Ashish Camacho RN  Outcome: Ongoing  12/6/2020 1937 by Geremias Morel RCP  Outcome: Ongoing  12/6/2020 1819 by Ramy Stein RN  Outcome: Ongoing  Goal: Oral health is maintained or improved  12/7/2020 0630 by Ashish Camacho RN  Outcome: Ongoing  12/6/2020 1937 by Geremias Morel RCP  Outcome: Ongoing  12/6/2020 1819 by Ramy Stein RN  Outcome: Ongoing  Goal: Ability to express needs and understand communication  12/7/2020 0630 by Ashish Camacho RN  Outcome: Ongoing  12/6/2020 1937 by Venus Sanchez RCP  Outcome: Ongoing  12/6/2020 1819 by Em Galvan RN  Outcome: Ongoing  Goal: Mobility/activity is maintained at optimum level for patient  12/7/2020 0630 by Spencer Ramos RN  Outcome: Ongoing  12/6/2020 1937 by Venus Sanchez RCP  Outcome: Ongoing  12/6/2020 1819 by Em Galvan RN  Outcome: Ongoing  Goal: Tracheostomy will be managed safely  12/6/2020 1937 by Venus Sanchez RCP  Outcome: Ongoing     Problem: SKIN INTEGRITY  Goal: Skin integrity is maintained or improved  12/7/2020 0630 by Spencer Ramos RN  Outcome: Ongoing  12/6/2020 1937 by Venus Sanchez RCP  Outcome: Ongoing  12/6/2020 1819 by Em Galvan RN  Outcome: Ongoing     Problem: PAIN  Goal: STG - Patient will increase activity level  12/7/2020 0630 by Spencer Ramos RN  Outcome: Ongoing  12/6/2020 1819 by Em Galvan RN  Outcome: Ongoing  Goal: STG - patient verbalizes a reduction in pain level  12/7/2020 0630 by Spencer Ramos RN  Outcome: Ongoing  12/6/2020 1819 by Em Galvan RN  Outcome: Ongoing     Problem: Confusion - Acute:  Goal: Absence of continued neurological deterioration signs and symptoms  Description: Absence of continued neurological deterioration signs and symptoms  12/7/2020 0630 by Spencer Ramos RN  Outcome: Ongoing  12/6/2020 1819 by Em Galvan RN  Outcome: Ongoing  Goal: Mental status will be restored to baseline  Description: Mental status will be restored to baseline  12/7/2020 0630 by Spencer Ramos RN  Outcome: Ongoing  12/6/2020 1819 by Em Galvan RN  Outcome: Ongoing     Problem: Discharge Planning:  Goal: Ability to perform activities of daily living will improve  Description: Ability to perform activities of daily living will improve  12/7/2020 0630 by Spencer Ramos RN  Outcome: Ongoing  12/6/2020 1819 by Em Galvan RN  Outcome: Ongoing  Goal: Participates in care planning  Description: Participates in care planning  12/7/2020 0630 by Clive Choi RN  Outcome: Ongoing  12/6/2020 1819 by Aquilino Jessica RN  Outcome: Ongoing     Problem: Injury - Risk of, Physical Injury:  Goal: Will remain free from falls  Description: Will remain free from falls  12/7/2020 0630 by Clive Choi RN  Outcome: Ongoing  12/6/2020 1819 by Aquilino Jessica RN  Outcome: Ongoing  Goal: Absence of physical injury  Description: Absence of physical injury  12/7/2020 0630 by Clive Choi RN  Outcome: Ongoing  12/6/2020 1819 by Aquilino Jessica RN  Outcome: Ongoing     Problem: Mood - Altered:  Goal: Mood stable  Description: Mood stable  12/7/2020 0630 by Clive Choi RN  Outcome: Ongoing  12/6/2020 1819 by Aquilino Jessica RN  Outcome: Ongoing  Goal: Absence of abusive behavior  Description: Absence of abusive behavior  12/7/2020 0630 by Clive Choi RN  Outcome: Ongoing  12/6/2020 1819 by Aquilino Jessica RN  Outcome: Ongoing  Goal: Verbalizations of feeling emotionally comfortable while being cared for will increase  Description: Verbalizations of feeling emotionally comfortable while being cared for will increase  12/7/2020 0630 by Clive Choi RN  Outcome: Ongoing  12/6/2020 1819 by Aquilino Jessica RN  Outcome: Ongoing     Problem: Psychomotor Activity - Altered:  Goal: Absence of psychomotor disturbance signs and symptoms  Description: Absence of psychomotor disturbance signs and symptoms  12/7/2020 0630 by Clive Choi RN  Outcome: Ongoing  12/6/2020 1819 by Aquilino Jessica RN  Outcome: Ongoing     Problem: Sensory Perception - Impaired:  Goal: Demonstrations of improved sensory functioning will increase  Description: Demonstrations of improved sensory functioning will increase  12/7/2020 0630 by Clive Choi RN  Outcome: Ongoing  12/6/2020 1819 by Aquilino Jessica RN  Outcome: Ongoing  Goal: Decrease in sensory misperception frequency  Description: Decrease in sensory misperception frequency  12/7/2020 0630 by Maddison Ho RN  Outcome: Ongoing  12/6/2020 1819 by Mac Purcell RN  Outcome: Ongoing  Goal: Able to refrain from responding to false sensory perceptions  Description: Able to refrain from responding to false sensory perceptions  12/7/2020 0630 by Maddison Ho RN  Outcome: Ongoing  12/6/2020 1819 by Mac Purcell RN  Outcome: Ongoing  Goal: Demonstrates accurate environmental perceptions  Description: Demonstrates accurate environmental perceptions  12/7/2020 0630 by Maddison Ho RN  Outcome: Ongoing  12/6/2020 1819 by Mac Purcell RN  Outcome: Ongoing  Goal: Able to distinguish between reality-based and nonreality-based thinking  Description: Able to distinguish between reality-based and nonreality-based thinking  12/7/2020 0630 by Maddison Ho RN  Outcome: Ongoing  12/6/2020 1819 by Mac Purcell RN  Outcome: Ongoing  Goal: Able to interrupt nonreality-based thinking  Description: Able to interrupt nonreality-based thinking  12/7/2020 0630 by Maddison Ho RN  Outcome: Ongoing  12/6/2020 1819 by Mac Purcell RN  Outcome: Ongoing     Problem: Sleep Pattern Disturbance:  Goal: Appears well-rested  Description: Appears well-rested  12/7/2020 0630 by Maddison Ho RN  Outcome: Ongoing  12/6/2020 1819 by Mac Purcell RN  Outcome: Ongoing
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
## 2020-12-08 ENCOUNTER — APPOINTMENT (OUTPATIENT)
Dept: CT IMAGING | Age: 20
DRG: 004 | End: 2020-12-08
Payer: MEDICAID

## 2020-12-08 ENCOUNTER — APPOINTMENT (OUTPATIENT)
Dept: GENERAL RADIOLOGY | Age: 20
DRG: 004 | End: 2020-12-08
Payer: MEDICAID

## 2020-12-08 LAB
ABSOLUTE EOS #: <0.03 K/UL (ref 0–0.44)
ABSOLUTE IMMATURE GRANULOCYTE: 0.12 K/UL (ref 0–0.3)
ABSOLUTE LYMPH #: 0.89 K/UL (ref 1.1–3.7)
ABSOLUTE MONO #: 0.48 K/UL (ref 0.1–1.4)
ALLEN TEST: ABNORMAL
ALLEN TEST: ABNORMAL
ANION GAP SERPL CALCULATED.3IONS-SCNC: 9 MMOL/L (ref 9–17)
BASOPHILS # BLD: 0 % (ref 0–2)
BASOPHILS ABSOLUTE: <0.03 K/UL (ref 0–0.2)
BUN BLDV-MCNC: 16 MG/DL (ref 6–20)
BUN/CREAT BLD: ABNORMAL (ref 9–20)
CALCIUM SERPL-MCNC: 8.3 MG/DL (ref 8.6–10.4)
CHLORIDE BLD-SCNC: 105 MMOL/L (ref 98–107)
CO2: 24 MMOL/L (ref 20–31)
CREAT SERPL-MCNC: 0.42 MG/DL (ref 0.7–1.2)
DIFFERENTIAL TYPE: ABNORMAL
EOSINOPHILS RELATIVE PERCENT: 0 % (ref 1–4)
FIO2: 100
FIO2: 70
GFR AFRICAN AMERICAN: >60 ML/MIN
GFR NON-AFRICAN AMERICAN: >60 ML/MIN
GFR SERPL CREATININE-BSD FRML MDRD: ABNORMAL ML/MIN/{1.73_M2}
GFR SERPL CREATININE-BSD FRML MDRD: ABNORMAL ML/MIN/{1.73_M2}
GLUCOSE BLD-MCNC: 106 MG/DL (ref 70–99)
HCT VFR BLD CALC: 29.1 % (ref 40.7–50.3)
HEMOGLOBIN: 9.9 G/DL (ref 13–17)
IMMATURE GRANULOCYTES: 2 %
LYMPHOCYTES # BLD: 12 % (ref 25–45)
MCH RBC QN AUTO: 30.9 PG (ref 25.2–33.5)
MCHC RBC AUTO-ENTMCNC: 34 G/DL (ref 28.4–34.8)
MCV RBC AUTO: 90.9 FL (ref 82.6–102.9)
MODE: ABNORMAL
MODE: ABNORMAL
MONOCYTES # BLD: 6 % (ref 2–8)
NEGATIVE BASE EXCESS, ART: ABNORMAL (ref 0–2)
NEGATIVE BASE EXCESS, ART: ABNORMAL (ref 0–2)
NRBC AUTOMATED: 0.5 PER 100 WBC
O2 DEVICE/FLOW/%: ABNORMAL
O2 DEVICE/FLOW/%: ABNORMAL
PATIENT TEMP: ABNORMAL
PATIENT TEMP: ABNORMAL
PDW BLD-RTO: 15.1 % (ref 11.8–14.4)
PLATELET # BLD: 193 K/UL (ref 138–453)
PLATELET ESTIMATE: ABNORMAL
PMV BLD AUTO: 10.5 FL (ref 8.1–13.5)
POC HCO3: 26.8 MMOL/L (ref 21–28)
POC HCO3: 27.2 MMOL/L (ref 21–28)
POC LACTIC ACID: 0.89 MMOL/L (ref 0.56–1.39)
POC LACTIC ACID: 0.94 MMOL/L (ref 0.56–1.39)
POC O2 SATURATION: 94 % (ref 94–98)
POC O2 SATURATION: 95 % (ref 94–98)
POC PCO2 TEMP: ABNORMAL MM HG
POC PCO2 TEMP: ABNORMAL MM HG
POC PCO2: 39.3 MM HG (ref 35–48)
POC PCO2: 39.7 MM HG (ref 35–48)
POC PH TEMP: ABNORMAL
POC PH TEMP: ABNORMAL
POC PH: 7.44 (ref 7.35–7.45)
POC PH: 7.44 (ref 7.35–7.45)
POC PO2 TEMP: ABNORMAL MM HG
POC PO2 TEMP: ABNORMAL MM HG
POC PO2: 68.5 MM HG (ref 83–108)
POC PO2: 72.1 MM HG (ref 83–108)
POSITIVE BASE EXCESS, ART: 2 (ref 0–3)
POSITIVE BASE EXCESS, ART: 3 (ref 0–3)
POTASSIUM SERPL-SCNC: 4.4 MMOL/L (ref 3.7–5.3)
RBC # BLD: 3.2 M/UL (ref 4.21–5.77)
RBC # BLD: ABNORMAL 10*6/UL
SAMPLE SITE: ABNORMAL
SAMPLE SITE: ABNORMAL
SEG NEUTROPHILS: 80 % (ref 34–64)
SEGMENTED NEUTROPHILS ABSOLUTE COUNT: 6.01 K/UL (ref 1.5–8.1)
SODIUM BLD-SCNC: 138 MMOL/L (ref 135–144)
TCO2 (CALC), ART: 28 MMOL/L (ref 22–29)
TCO2 (CALC), ART: 28 MMOL/L (ref 22–29)
WBC # BLD: 7.5 K/UL (ref 4.5–13.5)
WBC # BLD: ABNORMAL 10*3/UL

## 2020-12-08 PROCEDURE — 6370000000 HC RX 637 (ALT 250 FOR IP): Performed by: STUDENT IN AN ORGANIZED HEALTH CARE EDUCATION/TRAINING PROGRAM

## 2020-12-08 PROCEDURE — 6370000000 HC RX 637 (ALT 250 FOR IP): Performed by: NURSE PRACTITIONER

## 2020-12-08 PROCEDURE — 85025 COMPLETE CBC W/AUTO DIFF WBC: CPT

## 2020-12-08 PROCEDURE — 71045 X-RAY EXAM CHEST 1 VIEW: CPT

## 2020-12-08 PROCEDURE — 94761 N-INVAS EAR/PLS OXIMETRY MLT: CPT

## 2020-12-08 PROCEDURE — 94003 VENT MGMT INPAT SUBQ DAY: CPT

## 2020-12-08 PROCEDURE — 82803 BLOOD GASES ANY COMBINATION: CPT

## 2020-12-08 PROCEDURE — 6370000000 HC RX 637 (ALT 250 FOR IP): Performed by: GENERAL PRACTICE

## 2020-12-08 PROCEDURE — 83605 ASSAY OF LACTIC ACID: CPT

## 2020-12-08 PROCEDURE — 6360000002 HC RX W HCPCS: Performed by: STUDENT IN AN ORGANIZED HEALTH CARE EDUCATION/TRAINING PROGRAM

## 2020-12-08 PROCEDURE — 2700000000 HC OXYGEN THERAPY PER DAY

## 2020-12-08 PROCEDURE — 6360000004 HC RX CONTRAST MEDICATION: Performed by: STUDENT IN AN ORGANIZED HEALTH CARE EDUCATION/TRAINING PROGRAM

## 2020-12-08 PROCEDURE — 2000000000 HC ICU R&B

## 2020-12-08 PROCEDURE — 2580000003 HC RX 258: Performed by: GENERAL PRACTICE

## 2020-12-08 PROCEDURE — 2580000003 HC RX 258: Performed by: STUDENT IN AN ORGANIZED HEALTH CARE EDUCATION/TRAINING PROGRAM

## 2020-12-08 PROCEDURE — 71260 CT THORAX DX C+: CPT

## 2020-12-08 PROCEDURE — 36415 COLL VENOUS BLD VENIPUNCTURE: CPT

## 2020-12-08 PROCEDURE — 6360000002 HC RX W HCPCS: Performed by: NURSE PRACTITIONER

## 2020-12-08 PROCEDURE — 80048 BASIC METABOLIC PNL TOTAL CA: CPT

## 2020-12-08 PROCEDURE — 6360000002 HC RX W HCPCS: Performed by: GENERAL PRACTICE

## 2020-12-08 PROCEDURE — 51701 INSERT BLADDER CATHETER: CPT

## 2020-12-08 PROCEDURE — 99232 SBSQ HOSP IP/OBS MODERATE 35: CPT | Performed by: FAMILY MEDICINE

## 2020-12-08 PROCEDURE — 36600 WITHDRAWAL OF ARTERIAL BLOOD: CPT

## 2020-12-08 PROCEDURE — 94770 HC ETCO2 MONITOR DAILY: CPT

## 2020-12-08 RX ADMIN — IOPAMIDOL 75 ML: 755 INJECTION, SOLUTION INTRAVENOUS at 08:45

## 2020-12-08 RX ADMIN — SODIUM CHLORIDE, PRESERVATIVE FREE 10 ML: 5 INJECTION INTRAVENOUS at 09:00

## 2020-12-08 RX ADMIN — CHLORHEXIDINE GLUCONATE 15 ML: 1.2 RINSE ORAL at 09:00

## 2020-12-08 RX ADMIN — IBUPROFEN 400 MG: 200 SUSPENSION ORAL at 18:47

## 2020-12-08 RX ADMIN — OFLOXACIN 1 DROP: 3 SOLUTION OPHTHALMIC at 21:07

## 2020-12-08 RX ADMIN — ACETAMINOPHEN 1000 MG: 650 SOLUTION ORAL at 14:00

## 2020-12-08 RX ADMIN — LEVETIRACETAM 1000 MG: 100 SOLUTION ORAL at 20:43

## 2020-12-08 RX ADMIN — HALOPERIDOL LACTATE 5 MG: 5 INJECTION, SOLUTION INTRAMUSCULAR at 23:51

## 2020-12-08 RX ADMIN — GABAPENTIN 300 MG: 250 SUSPENSION ORAL at 05:41

## 2020-12-08 RX ADMIN — OXYCODONE HYDROCHLORIDE 10 MG: 5 SOLUTION ORAL at 02:27

## 2020-12-08 RX ADMIN — HALOPERIDOL LACTATE 5 MG: 5 INJECTION, SOLUTION INTRAMUSCULAR at 08:15

## 2020-12-08 RX ADMIN — IBUPROFEN 400 MG: 200 SUSPENSION ORAL at 10:29

## 2020-12-08 RX ADMIN — AMOXICILLIN AND CLAVULANATE POTASSIUM 1 TABLET: 875; 125 TABLET, FILM COATED ORAL at 09:00

## 2020-12-08 RX ADMIN — HALOPERIDOL LACTATE 5 MG: 5 INJECTION, SOLUTION INTRAMUSCULAR at 01:50

## 2020-12-08 RX ADMIN — HALOPERIDOL LACTATE 5 MG: 5 INJECTION, SOLUTION INTRAMUSCULAR at 12:15

## 2020-12-08 RX ADMIN — PIPERACILLIN AND TAZOBACTAM 4.5 G: 4; .5 INJECTION, POWDER, LYOPHILIZED, FOR SOLUTION INTRAVENOUS; PARENTERAL at 18:47

## 2020-12-08 RX ADMIN — QUETIAPINE FUMARATE 100 MG: 100 TABLET ORAL at 09:00

## 2020-12-08 RX ADMIN — IBUPROFEN 400 MG: 200 SUSPENSION ORAL at 20:43

## 2020-12-08 RX ADMIN — IBUPROFEN 400 MG: 200 SUSPENSION ORAL at 14:00

## 2020-12-08 RX ADMIN — OXYCODONE HYDROCHLORIDE 10 MG: 5 SOLUTION ORAL at 23:17

## 2020-12-08 RX ADMIN — SODIUM CHLORIDE, PRESERVATIVE FREE 10 ML: 5 INJECTION INTRAVENOUS at 21:07

## 2020-12-08 RX ADMIN — METHOCARBAMOL TABLETS 750 MG: 750 TABLET, COATED ORAL at 11:45

## 2020-12-08 RX ADMIN — PIPERACILLIN AND TAZOBACTAM 4.5 G: 4; .5 INJECTION, POWDER, LYOPHILIZED, FOR SOLUTION INTRAVENOUS; PARENTERAL at 12:42

## 2020-12-08 RX ADMIN — METHOCARBAMOL TABLETS 750 MG: 750 TABLET, COATED ORAL at 20:43

## 2020-12-08 RX ADMIN — LEVETIRACETAM 1000 MG: 100 SOLUTION ORAL at 09:00

## 2020-12-08 RX ADMIN — OXYCODONE HYDROCHLORIDE 10 MG: 5 SOLUTION ORAL at 14:21

## 2020-12-08 RX ADMIN — POTASSIUM BICARBONATE 40 MEQ: 782 TABLET, EFFERVESCENT ORAL at 09:00

## 2020-12-08 RX ADMIN — OFLOXACIN 1 DROP: 3 SOLUTION OPHTHALMIC at 14:00

## 2020-12-08 RX ADMIN — OFLOXACIN 1 DROP: 3 SOLUTION OPHTHALMIC at 09:00

## 2020-12-08 RX ADMIN — QUETIAPINE FUMARATE 100 MG: 100 TABLET ORAL at 20:43

## 2020-12-08 RX ADMIN — OXYCODONE HYDROCHLORIDE 10 MG: 5 SOLUTION ORAL at 08:14

## 2020-12-08 RX ADMIN — ACETAMINOPHEN 1000 MG: 650 SOLUTION ORAL at 23:17

## 2020-12-08 RX ADMIN — ACETAMINOPHEN 1000 MG: 650 SOLUTION ORAL at 05:40

## 2020-12-08 RX ADMIN — ENOXAPARIN SODIUM 30 MG: 30 INJECTION SUBCUTANEOUS at 20:43

## 2020-12-08 RX ADMIN — ENOXAPARIN SODIUM 30 MG: 30 INJECTION SUBCUTANEOUS at 09:00

## 2020-12-08 ASSESSMENT — PAIN SCALES - GENERAL
PAINLEVEL_OUTOF10: 9
PAINLEVEL_OUTOF10: 8
PAINLEVEL_OUTOF10: 9
PAINLEVEL_OUTOF10: 10
PAINLEVEL_OUTOF10: 8

## 2020-12-08 ASSESSMENT — PULMONARY FUNCTION TESTS
PIF_VALUE: 20
PIF_VALUE: 9
PIF_VALUE: 14
PIF_VALUE: 13
PIF_VALUE: 16
PIF_VALUE: 18

## 2020-12-08 NOTE — CARE COORDINATION
Transitional Planning    Left VM for patient's father. [address] Patient's father called back. Explained what LTACH is and what options patient has. Corbin Jorden (patient's father) chose [facility] for first choice. Referral sent. 1240 left VM for [facility] at [facility]

## 2020-12-08 NOTE — PLAN OF CARE
Problem: OXYGENATION/RESPIRATORY FUNCTION  Goal: Patient will maintain patent airway  12/8/2020 1044 by Veronica Cooper RCP  Outcome: Ongoing  12/8/2020 0532 by Juan J Wren RN  Outcome: Ongoing  12/7/2020 2105 by Pasquale Councilman  Outcome: Ongoing     Problem: OXYGENATION/RESPIRATORY FUNCTION  Goal: Patient will achieve/maintain normal respiratory rate/effort  Description: Respiratory rate and effort will be within normal limits for the patient  12/8/2020 1044 by Veronica Cooper RCP  Outcome: Ongoing  12/8/2020 0532 by Juan J Wren RN  Outcome: Ongoing  12/7/2020 2105 by Pasquale Councilman  Outcome: Ongoing     Problem: MECHANICAL VENTILATION  Goal: Patient will maintain patent airway  12/8/2020 1044 by Veronica Cooper RCP  Outcome: Ongoing  12/8/2020 0532 by Juan J Wren RN  Outcome: Ongoing  12/7/2020 2105 by Pasquale Councilman  Outcome: Ongoing     Problem: MECHANICAL VENTILATION  Goal: Oral health is maintained or improved  12/8/2020 1044 by Veronica Cooper RCP  Outcome: Ongoing  12/8/2020 0532 by Juan J Wren RN  Outcome: Ongoing  12/7/2020 2105 by Pasquale Councilman  Outcome: Ongoing     Problem: MECHANICAL VENTILATION  Goal: Ability to express needs and understand communication  12/8/2020 1044 by Veronica Cooper RCP  Outcome: Ongoing  12/8/2020 0532 by Juan J Wren RN  Outcome: Ongoing  12/7/2020 2105 by Pasquale Councilman  Outcome: Ongoing     Problem: MECHANICAL VENTILATION  Goal: Mobility/activity is maintained at optimum level for patient  12/8/2020 1044 by Veronica Cooper RCP  Outcome: Ongoing  12/8/2020 0532 by Juan J Wren RN  Outcome: Ongoing  12/7/2020 2105 by Pasquale Councilman  Outcome: Ongoing     Problem: MECHANICAL VENTILATION  Goal: Tracheostomy will be managed safely  12/8/2020 1044 by Veronica Cooper RCP  Outcome: Ongoing  12/7/2020 2105 by Pasquale Councilman  Outcome: Ongoing     Problem: SKIN INTEGRITY  Goal: Skin integrity is maintained or improved  12/8/2020 1044 by Veronica Cooper RCP  Outcome: Ongoing  12/8/2020 0532 by Elenita Lam RN  Outcome: Ongoing  12/7/2020 2105 by Olivier Duffy  Outcome: Ongoing
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
## 2020-12-08 NOTE — PLAN OF CARE
Problem: Falls - Risk of:  Goal: Will remain free from falls  Description: Will remain free from falls  Outcome: Ongoing  Goal: Absence of physical injury  Description: Absence of physical injury  Outcome: Ongoing     Problem: Skin Integrity:  Goal: Will show no infection signs and symptoms  Description: Will show no infection signs and symptoms  Outcome: Ongoing  Goal: Absence of new skin breakdown  Description: Absence of new skin breakdown  Outcome: Ongoing     Problem: Anxiety/Stress:  Goal: Level of anxiety will decrease  Description: Level of anxiety will decrease  Outcome: Ongoing     Problem: Cardiac Output - Decreased:  Goal: Hemodynamic stability will improve  Description: Hemodynamic stability will improve  Outcome: Ongoing     Problem: Pain:  Goal: Pain level will decrease  Description: Pain level will decrease  Outcome: Ongoing  Goal: Recognizes and communicates pain  Description: Recognizes and communicates pain  Outcome: Ongoing  Goal: Control of acute pain  Description: Control of acute pain  Outcome: Ongoing  Goal: Control of chronic pain  Description: Control of chronic pain  Outcome: Ongoing     Problem: Tissue Perfusion - Cardiopulmonary, Altered:  Goal: Hemodynamic stability will improve  Description: Hemodynamic stability will improve  Outcome: Ongoing     Problem: Nutrition  Goal: Optimal nutrition therapy  Description: Nutrition Problem #1: Inadequate oral intake  Intervention: Food and/or Nutrient Delivery: Continue NPO, Start Tube Feeding  Nutritional Goals: EN intake to meet % of estimated nutrition needs     Outcome: Ongoing     Problem: OXYGENATION/RESPIRATORY FUNCTION  Goal: Patient will maintain patent airway  12/8/2020 0532 by Ashish Camacho RN  Outcome: Ongoing  12/7/2020 2105 by Maurisio Abdullahi  Outcome: Ongoing  Goal: Patient will achieve/maintain normal respiratory rate/effort  Description: Respiratory rate and effort will be within normal limits for the patient  12/8/2020 0532 by Laura Kraus RN  Outcome: Ongoing  12/7/2020 2105 by Patricia Bautista  Outcome: Ongoing     Problem: MECHANICAL VENTILATION  Goal: Patient will maintain patent airway  12/8/2020 0532 by Laura Kraus RN  Outcome: Ongoing  12/7/2020 2105 by Patricia Bautista  Outcome: Ongoing  Goal: Oral health is maintained or improved  12/8/2020 0532 by Laura Kraus RN  Outcome: Ongoing  12/7/2020 2105 by Patricia Bautista  Outcome: Ongoing  Goal: Ability to express needs and understand communication  12/8/2020 0532 by Laura Kraus RN  Outcome: Ongoing  12/7/2020 2105 by Patricia Bautista  Outcome: Ongoing  Goal: Mobility/activity is maintained at optimum level for patient  12/8/2020 0532 by Laura Kraus RN  Outcome: Ongoing  12/7/2020 2105 by Ptaricia Bautista  Outcome: Ongoing  Goal: Tracheostomy will be managed safely  12/7/2020 2105 by Patricia Bautista  Outcome: Ongoing     Problem: SKIN INTEGRITY  Goal: Skin integrity is maintained or improved  12/8/2020 0532 by Laura Kraus RN  Outcome: Ongoing  12/7/2020 2105 by Patricia Bautista  Outcome: Ongoing     Problem: PAIN  Goal: STG - Patient will increase activity level  Outcome: Ongoing  Goal: STG - patient verbalizes a reduction in pain level  Outcome: Ongoing     Problem: Confusion - Acute:  Goal: Absence of continued neurological deterioration signs and symptoms  Description: Absence of continued neurological deterioration signs and symptoms  Outcome: Ongoing  Goal: Mental status will be restored to baseline  Description: Mental status will be restored to baseline  Outcome: Ongoing     Problem: Discharge Planning:  Goal: Ability to perform activities of daily living will improve  Description: Ability to perform activities of daily living will improve  Outcome: Ongoing  Goal: Participates in care planning  Description: Participates in care planning  Outcome: Ongoing     Problem: Injury - Risk of, Physical Injury:  Goal: Will remain free from falls  Description: Will remain free from falls  Outcome: Ongoing  Goal: Absence of physical injury  Description: Absence of physical injury  Outcome: Ongoing     Problem: Mood - Altered:  Goal: Mood stable  Description: Mood stable  Outcome: Ongoing  Goal: Absence of abusive behavior  Description: Absence of abusive behavior  Outcome: Ongoing  Goal: Verbalizations of feeling emotionally comfortable while being cared for will increase  Description: Verbalizations of feeling emotionally comfortable while being cared for will increase  Outcome: Ongoing     Problem: Psychomotor Activity - Altered:  Goal: Absence of psychomotor disturbance signs and symptoms  Description: Absence of psychomotor disturbance signs and symptoms  Outcome: Ongoing     Problem: Sensory Perception - Impaired:  Goal: Demonstrations of improved sensory functioning will increase  Description: Demonstrations of improved sensory functioning will increase  Outcome: Ongoing  Goal: Decrease in sensory misperception frequency  Description: Decrease in sensory misperception frequency  Outcome: Ongoing  Goal: Able to refrain from responding to false sensory perceptions  Description: Able to refrain from responding to false sensory perceptions  Outcome: Ongoing  Goal: Demonstrates accurate environmental perceptions  Description: Demonstrates accurate environmental perceptions  Outcome: Ongoing  Goal: Able to distinguish between reality-based and nonreality-based thinking  Description: Able to distinguish between reality-based and nonreality-based thinking  Outcome: Ongoing  Goal: Able to interrupt nonreality-based thinking  Description: Able to interrupt nonreality-based thinking  Outcome: Ongoing     Problem: Sleep Pattern Disturbance:  Goal: Appears well-rested  Description: Appears well-rested  Outcome: Ongoing

## 2020-12-08 NOTE — PLAN OF CARE
Problem: OXYGENATION/RESPIRATORY FUNCTION  Goal: Patient will maintain patent airway  12/7/2020 2105 by Deric Liao  Outcome: Ongoing     Problem: OXYGENATION/RESPIRATORY FUNCTION  Goal: Patient will achieve/maintain normal respiratory rate/effort  Description: Respiratory rate and effort will be within normal limits for the patient  12/7/2020 2105 by Deric Liao  Outcome: Ongoing     Problem: MECHANICAL VENTILATION  Goal: Patient will maintain patent airway  12/7/2020 2105 by Deric Liao  Outcome: Ongoing     Problem: MECHANICAL VENTILATION  Goal: Oral health is maintained or improved  12/7/2020 2105 by Deric Liao  Outcome: Ongoing     Problem: MECHANICAL VENTILATION  Goal: Ability to express needs and understand communication  12/7/2020 2105 by Deric Liao  Outcome: Ongoing     Problem: MECHANICAL VENTILATION  Goal: Mobility/activity is maintained at optimum level for patient  12/7/2020 2105 by Deric Liao  Outcome: Ongoing     Problem: MECHANICAL VENTILATION  Goal: Tracheostomy will be managed safely  12/7/2020 2105 by Deric Liao  Outcome: Ongoing     Problem: SKIN INTEGRITY  Goal: Skin integrity is maintained or improved  12/7/2020 2105 by Deric Liao  Outcome: Ongoing
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
## 2020-12-08 NOTE — DISCHARGE INSTR - DIET

## 2020-12-09 ENCOUNTER — APPOINTMENT (OUTPATIENT)
Dept: GENERAL RADIOLOGY | Age: 20
DRG: 004 | End: 2020-12-09
Payer: MEDICAID

## 2020-12-09 LAB
ALLEN TEST: ABNORMAL
ANION GAP SERPL CALCULATED.3IONS-SCNC: 10 MMOL/L (ref 9–17)
BUN BLDV-MCNC: 22 MG/DL (ref 6–20)
BUN/CREAT BLD: ABNORMAL (ref 9–20)
CALCIUM SERPL-MCNC: 7.9 MG/DL (ref 8.6–10.4)
CHLORIDE BLD-SCNC: 107 MMOL/L (ref 98–107)
CO2: 25 MMOL/L (ref 20–31)
CREAT SERPL-MCNC: 0.59 MG/DL (ref 0.7–1.2)
FIO2: 70
GFR AFRICAN AMERICAN: >60 ML/MIN
GFR NON-AFRICAN AMERICAN: >60 ML/MIN
GFR SERPL CREATININE-BSD FRML MDRD: ABNORMAL ML/MIN/{1.73_M2}
GFR SERPL CREATININE-BSD FRML MDRD: ABNORMAL ML/MIN/{1.73_M2}
GLUCOSE BLD-MCNC: 112 MG/DL (ref 74–100)
GLUCOSE BLD-MCNC: 114 MG/DL (ref 70–99)
HCT VFR BLD CALC: 26.7 % (ref 40.7–50.3)
HEMOGLOBIN: 8.6 G/DL (ref 13–17)
MCH RBC QN AUTO: 30.4 PG (ref 25.2–33.5)
MCHC RBC AUTO-ENTMCNC: 32.2 G/DL (ref 28.4–34.8)
MCV RBC AUTO: 94.3 FL (ref 82.6–102.9)
MODE: ABNORMAL
NEGATIVE BASE EXCESS, ART: ABNORMAL (ref 0–2)
NRBC AUTOMATED: 0.2 PER 100 WBC
O2 DEVICE/FLOW/%: ABNORMAL
PATIENT TEMP: ABNORMAL
PDW BLD-RTO: 15.2 % (ref 11.8–14.4)
PLATELET # BLD: 246 K/UL (ref 138–453)
PMV BLD AUTO: 10.1 FL (ref 8.1–13.5)
POC HCO3: 27.3 MMOL/L (ref 21–28)
POC LACTIC ACID: 0.76 MMOL/L (ref 0.56–1.39)
POC O2 SATURATION: 95 % (ref 94–98)
POC PCO2 TEMP: ABNORMAL MM HG
POC PCO2: 38.9 MM HG (ref 35–48)
POC PH TEMP: ABNORMAL
POC PH: 7.45 (ref 7.35–7.45)
POC PO2 TEMP: ABNORMAL MM HG
POC PO2: 70.2 MM HG (ref 83–108)
POSITIVE BASE EXCESS, ART: 3 (ref 0–3)
POTASSIUM SERPL-SCNC: 3.9 MMOL/L (ref 3.7–5.3)
RBC # BLD: 2.83 M/UL (ref 4.21–5.77)
SAMPLE SITE: ABNORMAL
SODIUM BLD-SCNC: 142 MMOL/L (ref 135–144)
TCO2 (CALC), ART: 29 MMOL/L (ref 22–29)
WBC # BLD: 9.4 K/UL (ref 4.5–13.5)

## 2020-12-09 PROCEDURE — 2700000000 HC OXYGEN THERAPY PER DAY

## 2020-12-09 PROCEDURE — 85027 COMPLETE CBC AUTOMATED: CPT

## 2020-12-09 PROCEDURE — 6370000000 HC RX 637 (ALT 250 FOR IP): Performed by: GENERAL PRACTICE

## 2020-12-09 PROCEDURE — 82803 BLOOD GASES ANY COMBINATION: CPT

## 2020-12-09 PROCEDURE — 6360000002 HC RX W HCPCS: Performed by: STUDENT IN AN ORGANIZED HEALTH CARE EDUCATION/TRAINING PROGRAM

## 2020-12-09 PROCEDURE — 2580000003 HC RX 258: Performed by: GENERAL PRACTICE

## 2020-12-09 PROCEDURE — 51701 INSERT BLADDER CATHETER: CPT

## 2020-12-09 PROCEDURE — 97110 THERAPEUTIC EXERCISES: CPT

## 2020-12-09 PROCEDURE — 82947 ASSAY GLUCOSE BLOOD QUANT: CPT

## 2020-12-09 PROCEDURE — 6370000000 HC RX 637 (ALT 250 FOR IP): Performed by: NURSE PRACTITIONER

## 2020-12-09 PROCEDURE — 36600 WITHDRAWAL OF ARTERIAL BLOOD: CPT

## 2020-12-09 PROCEDURE — 80048 BASIC METABOLIC PNL TOTAL CA: CPT

## 2020-12-09 PROCEDURE — 83605 ASSAY OF LACTIC ACID: CPT

## 2020-12-09 PROCEDURE — 2580000003 HC RX 258: Performed by: NURSE PRACTITIONER

## 2020-12-09 PROCEDURE — 2580000003 HC RX 258: Performed by: STUDENT IN AN ORGANIZED HEALTH CARE EDUCATION/TRAINING PROGRAM

## 2020-12-09 PROCEDURE — 6370000000 HC RX 637 (ALT 250 FOR IP): Performed by: STUDENT IN AN ORGANIZED HEALTH CARE EDUCATION/TRAINING PROGRAM

## 2020-12-09 PROCEDURE — 36415 COLL VENOUS BLD VENIPUNCTURE: CPT

## 2020-12-09 PROCEDURE — 6360000002 HC RX W HCPCS: Performed by: NURSE PRACTITIONER

## 2020-12-09 PROCEDURE — 94770 HC ETCO2 MONITOR DAILY: CPT

## 2020-12-09 PROCEDURE — 2000000000 HC ICU R&B

## 2020-12-09 PROCEDURE — 94761 N-INVAS EAR/PLS OXIMETRY MLT: CPT

## 2020-12-09 PROCEDURE — 71045 X-RAY EXAM CHEST 1 VIEW: CPT

## 2020-12-09 PROCEDURE — 97530 THERAPEUTIC ACTIVITIES: CPT

## 2020-12-09 PROCEDURE — 6360000002 HC RX W HCPCS: Performed by: GENERAL PRACTICE

## 2020-12-09 PROCEDURE — 94003 VENT MGMT INPAT SUBQ DAY: CPT

## 2020-12-09 RX ORDER — OXYCODONE HCL 5 MG/5 ML
10 SOLUTION, ORAL ORAL EVERY 8 HOURS PRN
Status: DISCONTINUED | OUTPATIENT
Start: 2020-12-09 | End: 2020-12-10

## 2020-12-09 RX ORDER — METHOCARBAMOL 750 MG/1
750 TABLET, FILM COATED ORAL 3 TIMES DAILY PRN
Status: DISCONTINUED | OUTPATIENT
Start: 2020-12-09 | End: 2020-12-11 | Stop reason: HOSPADM

## 2020-12-09 RX ORDER — FENTANYL CITRATE 50 UG/ML
50 INJECTION, SOLUTION INTRAMUSCULAR; INTRAVENOUS ONCE
Status: COMPLETED | OUTPATIENT
Start: 2020-12-09 | End: 2020-12-09

## 2020-12-09 RX ADMIN — FENTANYL CITRATE 50 MCG: 50 INJECTION, SOLUTION INTRAMUSCULAR; INTRAVENOUS at 03:11

## 2020-12-09 RX ADMIN — ACETAMINOPHEN 1000 MG: 650 SOLUTION ORAL at 06:52

## 2020-12-09 RX ADMIN — ACETAMINOPHEN 1000 MG: 650 SOLUTION ORAL at 22:54

## 2020-12-09 RX ADMIN — PIPERACILLIN AND TAZOBACTAM 4.5 G: 4; .5 INJECTION, POWDER, LYOPHILIZED, FOR SOLUTION INTRAVENOUS; PARENTERAL at 10:55

## 2020-12-09 RX ADMIN — ENOXAPARIN SODIUM 30 MG: 30 INJECTION SUBCUTANEOUS at 09:24

## 2020-12-09 RX ADMIN — PIPERACILLIN AND TAZOBACTAM 4.5 G: 4; .5 INJECTION, POWDER, LYOPHILIZED, FOR SOLUTION INTRAVENOUS; PARENTERAL at 03:12

## 2020-12-09 RX ADMIN — LEVETIRACETAM 1000 MG: 100 SOLUTION ORAL at 09:24

## 2020-12-09 RX ADMIN — IBUPROFEN 400 MG: 200 SUSPENSION ORAL at 10:55

## 2020-12-09 RX ADMIN — ACETAMINOPHEN 1000 MG: 650 SOLUTION ORAL at 14:01

## 2020-12-09 RX ADMIN — IBUPROFEN 400 MG: 200 SUSPENSION ORAL at 03:11

## 2020-12-09 RX ADMIN — CHLORHEXIDINE GLUCONATE 15 ML: 1.2 RINSE ORAL at 09:45

## 2020-12-09 RX ADMIN — OFLOXACIN 1 DROP: 3 SOLUTION OPHTHALMIC at 20:28

## 2020-12-09 RX ADMIN — OFLOXACIN 1 DROP: 3 SOLUTION OPHTHALMIC at 13:51

## 2020-12-09 RX ADMIN — POLYETHYLENE GLYCOL 3350 17 G: 17 POWDER, FOR SOLUTION ORAL at 09:39

## 2020-12-09 RX ADMIN — METHOCARBAMOL TABLETS 750 MG: 750 TABLET, COATED ORAL at 03:13

## 2020-12-09 RX ADMIN — METHOCARBAMOL TABLETS 750 MG: 750 TABLET, COATED ORAL at 10:55

## 2020-12-09 RX ADMIN — IBUPROFEN 400 MG: 200 SUSPENSION ORAL at 06:53

## 2020-12-09 RX ADMIN — QUETIAPINE FUMARATE 100 MG: 100 TABLET ORAL at 20:28

## 2020-12-09 RX ADMIN — OFLOXACIN 1 DROP: 3 SOLUTION OPHTHALMIC at 09:47

## 2020-12-09 RX ADMIN — SODIUM CHLORIDE, PRESERVATIVE FREE 10 ML: 5 INJECTION INTRAVENOUS at 20:28

## 2020-12-09 RX ADMIN — PIPERACILLIN AND TAZOBACTAM 4.5 G: 4; .5 INJECTION, POWDER, LYOPHILIZED, FOR SOLUTION INTRAVENOUS; PARENTERAL at 18:43

## 2020-12-09 RX ADMIN — QUETIAPINE FUMARATE 100 MG: 100 TABLET ORAL at 09:23

## 2020-12-09 RX ADMIN — ENOXAPARIN SODIUM 30 MG: 30 INJECTION SUBCUTANEOUS at 20:28

## 2020-12-09 RX ADMIN — SODIUM CHLORIDE, PRESERVATIVE FREE 10 ML: 5 INJECTION INTRAVENOUS at 09:39

## 2020-12-09 RX ADMIN — POTASSIUM BICARBONATE 40 MEQ: 782 TABLET, EFFERVESCENT ORAL at 09:23

## 2020-12-09 ASSESSMENT — PAIN SCALES - GENERAL: PAINLEVEL_OUTOF10: 10

## 2020-12-09 ASSESSMENT — PULMONARY FUNCTION TESTS
PIF_VALUE: 22
PIF_VALUE: 23

## 2020-12-09 ASSESSMENT — ENCOUNTER SYMPTOMS: TACHYPNEA: 1
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
## 2020-12-09 NOTE — PROGRESS NOTES
12/09/20 1718   Surgical Airway (trach) Shiley Cuffed   Placement Date/Time: 12/03/20 1618   Timeout: Patient;Procedure;Site/Side;Appropriate Equipment; Consent Confirmed;Sterile Technique using full body drape; Correct Position  Placed By: In surgery  Surgical Airway Type: Tracheostomy  Brand: Maggi  Style. .. Status Secured   Site Assessment Oozing Secretions   Site Care Cleansed;Dressing applied   Inner Cannula Care Changed/new   Ties Assessment Dry; Intact; Secure   Trach care completed
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
## 2020-12-09 NOTE — PLAN OF CARE
Problem: OXYGENATION/RESPIRATORY FUNCTION  Goal: Patient will maintain patent airway  12/8/2020 2100 by Tk Poole RCP  Outcome: Ongoing  12/8/2020 1044 by Edwin Flynn RCP  Outcome: Ongoing     Problem: OXYGENATION/RESPIRATORY FUNCTION  Goal: Patient will achieve/maintain normal respiratory rate/effort  Description: Respiratory rate and effort will be within normal limits for the patient  12/8/2020 2100 by kT Poole RCP  Outcome: Ongoing  12/8/2020 1044 by Edwin Flynn RCP  Outcome: Ongoing     Problem: MECHANICAL VENTILATION  Goal: Patient will maintain patent airway  12/8/2020 2100 by Tk Poole RCP  Outcome: Ongoing  12/8/2020 1044 by Edwin Flynn RCP  Outcome: Ongoing     Problem: MECHANICAL VENTILATION  Goal: Oral health is maintained or improved  12/8/2020 2100 by Tk Poole RCP  Outcome: Ongoing  12/8/2020 1044 by Edwin Flynn RCP  Outcome: Ongoing     Problem: MECHANICAL VENTILATION  Goal: Ability to express needs and understand communication  12/8/2020 2100 by Tk Poole RCP  Outcome: Ongoing  12/8/2020 1044 by Edwin Flynn RCP  Outcome: Ongoing     Problem: MECHANICAL VENTILATION  Goal: Mobility/activity is maintained at optimum level for patient  12/8/2020 2100 by Tk Poole RCP  Outcome: Ongoing  12/8/2020 1044 by Edwin Flynn RCP  Outcome: Ongoing     Problem: MECHANICAL VENTILATION  Goal: Tracheostomy will be managed safely  12/8/2020 2100 by Tk Poole RCP  Outcome: Ongoing  12/8/2020 1044 by Edwin Flynn RCP  Outcome: Ongoing     Problem: SKIN INTEGRITY  Goal: Skin integrity is maintained or improved  12/8/2020 2100 by Tk Poole RCP  Outcome: Ongoing  12/8/2020 1044 by Edwin Flynn RCP  Outcome: Ongoing

## 2020-12-09 NOTE — PLAN OF CARE
Problem: Falls - Risk of:  Goal: Will remain free from falls  Description: Will remain free from falls  Outcome: Met This Shift     Problem: Skin Integrity:  Goal: Absence of new skin breakdown  Description: Absence of new skin breakdown  Outcome: Met This Shift     Problem: Pain:  Goal: Control of acute pain  Description: Control of acute pain  Outcome: Met This Shift     Problem: SKIN INTEGRITY  Goal: Skin integrity is maintained or improved  Outcome: Met This Shift     Problem: Injury - Risk of, Physical Injury:  Goal: Will remain free from falls  Description: Will remain free from falls  Outcome: Met This Shift     Problem: Anxiety/Stress:  Goal: Level of anxiety will decrease  Description: Level of anxiety will decrease  Outcome: Ongoing     Problem: Cardiac Output - Decreased:  Goal: Hemodynamic stability will improve  Description: Hemodynamic stability will improve  Outcome: Ongoing     Problem: Nutrition  Goal: Optimal nutrition therapy  Description: Nutrition Problem #1: Inadequate oral intake  Intervention: Food and/or Nutrient Delivery: Continue NPO, Start Tube Feeding  Nutritional Goals: EN intake to meet % of estimated nutrition needs     Outcome: Ongoing     Problem: OXYGENATION/RESPIRATORY FUNCTION  Goal: Patient will achieve/maintain normal respiratory rate/effort  Description: Respiratory rate and effort will be within normal limits for the patient  Outcome: Ongoing     Problem: MECHANICAL VENTILATION  Goal: Ability to express needs and understand communication  Outcome: Ongoing

## 2020-12-10 ENCOUNTER — APPOINTMENT (OUTPATIENT)
Dept: GENERAL RADIOLOGY | Age: 20
DRG: 004 | End: 2020-12-10
Payer: MEDICAID

## 2020-12-10 LAB
ALLEN TEST: POSITIVE
ANION GAP SERPL CALCULATED.3IONS-SCNC: 7 MMOL/L (ref 9–17)
BUN BLDV-MCNC: 18 MG/DL (ref 6–20)
BUN/CREAT BLD: ABNORMAL (ref 9–20)
CALCIUM SERPL-MCNC: 8.1 MG/DL (ref 8.6–10.4)
CHLORIDE BLD-SCNC: 109 MMOL/L (ref 98–107)
CO2: 24 MMOL/L (ref 20–31)
CREAT SERPL-MCNC: 0.45 MG/DL (ref 0.7–1.2)
FIO2: 70
GFR AFRICAN AMERICAN: >60 ML/MIN
GFR NON-AFRICAN AMERICAN: >60 ML/MIN
GFR SERPL CREATININE-BSD FRML MDRD: ABNORMAL ML/MIN/{1.73_M2}
GFR SERPL CREATININE-BSD FRML MDRD: ABNORMAL ML/MIN/{1.73_M2}
GLUCOSE BLD-MCNC: 96 MG/DL (ref 70–99)
HCT VFR BLD CALC: 27.3 % (ref 40.7–50.3)
HEMOGLOBIN: 8.6 G/DL (ref 13–17)
MCH RBC QN AUTO: 30.4 PG (ref 25.2–33.5)
MCHC RBC AUTO-ENTMCNC: 31.5 G/DL (ref 28.4–34.8)
MCV RBC AUTO: 96.5 FL (ref 82.6–102.9)
MODE: ABNORMAL
NEGATIVE BASE EXCESS, ART: ABNORMAL (ref 0–2)
NRBC AUTOMATED: 0 PER 100 WBC
O2 DEVICE/FLOW/%: ABNORMAL
PATIENT TEMP: ABNORMAL
PDW BLD-RTO: 14.9 % (ref 11.8–14.4)
PLATELET # BLD: 295 K/UL (ref 138–453)
PMV BLD AUTO: 9.8 FL (ref 8.1–13.5)
POC HCO3: 26.6 MMOL/L (ref 21–28)
POC LACTIC ACID: 0.49 MMOL/L (ref 0.56–1.39)
POC O2 SATURATION: 100 % (ref 94–98)
POC PCO2 TEMP: ABNORMAL MM HG
POC PCO2: 41.5 MM HG (ref 35–48)
POC PH TEMP: ABNORMAL
POC PH: 7.42 (ref 7.35–7.45)
POC PO2 TEMP: ABNORMAL MM HG
POC PO2: 170.6 MM HG (ref 83–108)
POSITIVE BASE EXCESS, ART: 2 (ref 0–3)
POTASSIUM SERPL-SCNC: 4 MMOL/L (ref 3.7–5.3)
RBC # BLD: 2.83 M/UL (ref 4.21–5.77)
SAMPLE SITE: ABNORMAL
SODIUM BLD-SCNC: 140 MMOL/L (ref 135–144)
TCO2 (CALC), ART: 28 MMOL/L (ref 22–29)
WBC # BLD: 11.2 K/UL (ref 4.5–13.5)

## 2020-12-10 PROCEDURE — 97535 SELF CARE MNGMENT TRAINING: CPT

## 2020-12-10 PROCEDURE — 6360000002 HC RX W HCPCS: Performed by: NURSE PRACTITIONER

## 2020-12-10 PROCEDURE — 82803 BLOOD GASES ANY COMBINATION: CPT

## 2020-12-10 PROCEDURE — 6370000000 HC RX 637 (ALT 250 FOR IP): Performed by: STUDENT IN AN ORGANIZED HEALTH CARE EDUCATION/TRAINING PROGRAM

## 2020-12-10 PROCEDURE — 2700000000 HC OXYGEN THERAPY PER DAY

## 2020-12-10 PROCEDURE — 51701 INSERT BLADDER CATHETER: CPT

## 2020-12-10 PROCEDURE — 6360000002 HC RX W HCPCS: Performed by: STUDENT IN AN ORGANIZED HEALTH CARE EDUCATION/TRAINING PROGRAM

## 2020-12-10 PROCEDURE — 2580000003 HC RX 258: Performed by: STUDENT IN AN ORGANIZED HEALTH CARE EDUCATION/TRAINING PROGRAM

## 2020-12-10 PROCEDURE — 85027 COMPLETE CBC AUTOMATED: CPT

## 2020-12-10 PROCEDURE — 99232 SBSQ HOSP IP/OBS MODERATE 35: CPT | Performed by: FAMILY MEDICINE

## 2020-12-10 PROCEDURE — 80048 BASIC METABOLIC PNL TOTAL CA: CPT

## 2020-12-10 PROCEDURE — 36415 COLL VENOUS BLD VENIPUNCTURE: CPT

## 2020-12-10 PROCEDURE — 2000000000 HC ICU R&B

## 2020-12-10 PROCEDURE — 71045 X-RAY EXAM CHEST 1 VIEW: CPT

## 2020-12-10 PROCEDURE — 94770 HC ETCO2 MONITOR DAILY: CPT

## 2020-12-10 PROCEDURE — 36600 WITHDRAWAL OF ARTERIAL BLOOD: CPT

## 2020-12-10 PROCEDURE — 83605 ASSAY OF LACTIC ACID: CPT

## 2020-12-10 PROCEDURE — 6370000000 HC RX 637 (ALT 250 FOR IP): Performed by: GENERAL PRACTICE

## 2020-12-10 PROCEDURE — 94761 N-INVAS EAR/PLS OXIMETRY MLT: CPT

## 2020-12-10 PROCEDURE — 97530 THERAPEUTIC ACTIVITIES: CPT

## 2020-12-10 PROCEDURE — 2580000003 HC RX 258: Performed by: NURSE PRACTITIONER

## 2020-12-10 PROCEDURE — 94003 VENT MGMT INPAT SUBQ DAY: CPT

## 2020-12-10 RX ORDER — ALBUTEROL SULFATE 2.5 MG/3ML
2.5 SOLUTION RESPIRATORY (INHALATION) EVERY 4 HOURS PRN
Qty: 120 EACH | Refills: 3 | DISCHARGE
Start: 2020-12-10

## 2020-12-10 RX ORDER — OFLOXACIN 3 MG/ML
1 SOLUTION/ DROPS OPHTHALMIC 3 TIMES DAILY
DISCHARGE
Start: 2020-12-10 | End: 2020-12-20

## 2020-12-10 RX ORDER — QUETIAPINE FUMARATE 100 MG/1
100 TABLET, FILM COATED ORAL 2 TIMES DAILY
Qty: 60 TABLET | Refills: 3 | DISCHARGE
Start: 2020-12-10 | End: 2021-01-12 | Stop reason: CLARIF

## 2020-12-10 RX ADMIN — PIPERACILLIN AND TAZOBACTAM 4.5 G: 4; .5 INJECTION, POWDER, LYOPHILIZED, FOR SOLUTION INTRAVENOUS; PARENTERAL at 03:33

## 2020-12-10 RX ADMIN — POTASSIUM BICARBONATE 40 MEQ: 782 TABLET, EFFERVESCENT ORAL at 09:08

## 2020-12-10 RX ADMIN — ACETAMINOPHEN 1000 MG: 650 SOLUTION ORAL at 14:43

## 2020-12-10 RX ADMIN — SODIUM CHLORIDE, PRESERVATIVE FREE 10 ML: 5 INJECTION INTRAVENOUS at 09:08

## 2020-12-10 RX ADMIN — OFLOXACIN 1 DROP: 3 SOLUTION OPHTHALMIC at 09:08

## 2020-12-10 RX ADMIN — CHLORHEXIDINE GLUCONATE 15 ML: 1.2 RINSE ORAL at 20:37

## 2020-12-10 RX ADMIN — ENOXAPARIN SODIUM 30 MG: 30 INJECTION SUBCUTANEOUS at 09:08

## 2020-12-10 RX ADMIN — QUETIAPINE FUMARATE 100 MG: 100 TABLET ORAL at 20:37

## 2020-12-10 RX ADMIN — OFLOXACIN 1 DROP: 3 SOLUTION OPHTHALMIC at 20:37

## 2020-12-10 RX ADMIN — OFLOXACIN 1 DROP: 3 SOLUTION OPHTHALMIC at 14:43

## 2020-12-10 RX ADMIN — QUETIAPINE FUMARATE 100 MG: 100 TABLET ORAL at 09:08

## 2020-12-10 RX ADMIN — ACETAMINOPHEN 1000 MG: 650 SOLUTION ORAL at 06:55

## 2020-12-10 RX ADMIN — SODIUM CHLORIDE, PRESERVATIVE FREE 10 ML: 5 INJECTION INTRAVENOUS at 20:37

## 2020-12-10 RX ADMIN — PIPERACILLIN AND TAZOBACTAM 4.5 G: 4; .5 INJECTION, POWDER, LYOPHILIZED, FOR SOLUTION INTRAVENOUS; PARENTERAL at 20:36

## 2020-12-10 RX ADMIN — PIPERACILLIN AND TAZOBACTAM 4.5 G: 4; .5 INJECTION, POWDER, LYOPHILIZED, FOR SOLUTION INTRAVENOUS; PARENTERAL at 11:52

## 2020-12-10 RX ADMIN — ENOXAPARIN SODIUM 30 MG: 30 INJECTION SUBCUTANEOUS at 20:37

## 2020-12-10 ASSESSMENT — PULMONARY FUNCTION TESTS
PIF_VALUE: 17
PIF_VALUE: 20
PIF_VALUE: 19
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
## 2020-12-10 NOTE — PROGRESS NOTES
12/10/20 1730   Surgical Airway (trach) Shiley Cuffed   Placement Date/Time: 12/03/20 7830   Timeout: Patient;Procedure;Site/Side;Appropriate Equipment; Consent Confirmed;Sterile Technique using full body drape; Correct Position  Placed By: In surgery  Surgical Airway Type: Tracheostomy  Brand: Maggi  Style. .. Status Secured   Site Assessment Oozing Secretions   Site Care Cleansed;Dressing applied   Inner Cannula Care Changed/new   Ties Assessment Dry; Intact; Secure   Trach care completed.

## 2020-12-10 NOTE — PLAN OF CARE
Problem: OXYGENATION/RESPIRATORY FUNCTION  Goal: Patient will maintain patent airway  Outcome: Ongoing     Problem: OXYGENATION/RESPIRATORY FUNCTION  Goal: Patient will achieve/maintain normal respiratory rate/effort  Description: Respiratory rate and effort will be within normal limits for the patient  12/10/2020 0331 by Cassi Abel RCP  Outcome: Ongoing  12/9/2020 1839 by Carl Foley RN  Outcome: Ongoing     Problem: MECHANICAL VENTILATION  Goal: Patient will maintain patent airway  Outcome: Ongoing     Problem: MECHANICAL VENTILATION  Goal: Oral health is maintained or improved  Outcome: Ongoing     Problem: MECHANICAL VENTILATION  Goal: Ability to express needs and understand communication  12/10/2020 0331 by Cassi Abel RCP  Outcome: Ongoing  12/9/2020 1839 by Carl Foley RN  Outcome: Ongoing     Problem: MECHANICAL VENTILATION  Goal: Mobility/activity is maintained at optimum level for patient  Outcome: Ongoing     Problem: MECHANICAL VENTILATION  Goal: Tracheostomy will be managed safely  Outcome: Ongoing     Problem: SKIN INTEGRITY  Goal: Skin integrity is maintained or improved  12/10/2020 0331 by Cassi Abel RCP  Outcome: Ongoing  12/9/2020 1839 by Carl Foley RN  Outcome: Met This Shift
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
## 2020-12-10 NOTE — PLAN OF CARE
Nutrition Problem #1: Inadequate oral intake  Intervention: Food and/or Nutrient Delivery: Continue NPO, Continue Current Tube Feeding  Nutritional Goals: EN intake to meet % of estimated nutrition needs

## 2020-12-11 ENCOUNTER — APPOINTMENT (OUTPATIENT)
Dept: GENERAL RADIOLOGY | Age: 20
DRG: 004 | End: 2020-12-11
Payer: MEDICAID

## 2020-12-11 VITALS
DIASTOLIC BLOOD PRESSURE: 55 MMHG | BODY MASS INDEX: 22.63 KG/M2 | WEIGHT: 158.07 LBS | TEMPERATURE: 98.6 F | HEART RATE: 69 BPM | SYSTOLIC BLOOD PRESSURE: 149 MMHG | HEIGHT: 70 IN | OXYGEN SATURATION: 100 % | RESPIRATION RATE: 16 BRPM

## 2020-12-11 LAB
ALLEN TEST: POSITIVE
FIO2: 50
GLUCOSE BLD-MCNC: 106 MG/DL (ref 74–100)
MODE: ABNORMAL
NEGATIVE BASE EXCESS, ART: ABNORMAL (ref 0–2)
O2 DEVICE/FLOW/%: ABNORMAL
PATIENT TEMP: 37.2
POC HCO3: 25 MMOL/L (ref 21–28)
POC LACTIC ACID: 0.5 MMOL/L (ref 0.56–1.39)
POC O2 SATURATION: 88 % (ref 94–98)
POC PCO2 TEMP: ABNORMAL MM HG
POC PCO2: 36.3 MM HG (ref 35–48)
POC PH TEMP: ABNORMAL
POC PH: 7.45 (ref 7.35–7.45)
POC PO2 TEMP: ABNORMAL MM HG
POC PO2: 52.6 MM HG (ref 83–108)
POSITIVE BASE EXCESS, ART: 1 (ref 0–3)
SAMPLE SITE: ABNORMAL
TCO2 (CALC), ART: 26 MMOL/L (ref 22–29)

## 2020-12-11 PROCEDURE — 82803 BLOOD GASES ANY COMBINATION: CPT

## 2020-12-11 PROCEDURE — 6370000000 HC RX 637 (ALT 250 FOR IP): Performed by: GENERAL PRACTICE

## 2020-12-11 PROCEDURE — 6360000002 HC RX W HCPCS: Performed by: NURSE PRACTITIONER

## 2020-12-11 PROCEDURE — 51798 US URINE CAPACITY MEASURE: CPT

## 2020-12-11 PROCEDURE — 36600 WITHDRAWAL OF ARTERIAL BLOOD: CPT

## 2020-12-11 PROCEDURE — 82947 ASSAY GLUCOSE BLOOD QUANT: CPT

## 2020-12-11 PROCEDURE — 2580000003 HC RX 258: Performed by: STUDENT IN AN ORGANIZED HEALTH CARE EDUCATION/TRAINING PROGRAM

## 2020-12-11 PROCEDURE — 6360000002 HC RX W HCPCS: Performed by: STUDENT IN AN ORGANIZED HEALTH CARE EDUCATION/TRAINING PROGRAM

## 2020-12-11 PROCEDURE — 51701 INSERT BLADDER CATHETER: CPT

## 2020-12-11 PROCEDURE — 94770 HC ETCO2 MONITOR DAILY: CPT

## 2020-12-11 PROCEDURE — 94761 N-INVAS EAR/PLS OXIMETRY MLT: CPT

## 2020-12-11 PROCEDURE — 99232 SBSQ HOSP IP/OBS MODERATE 35: CPT | Performed by: FAMILY MEDICINE

## 2020-12-11 PROCEDURE — 2700000000 HC OXYGEN THERAPY PER DAY

## 2020-12-11 PROCEDURE — 2580000003 HC RX 258: Performed by: NURSE PRACTITIONER

## 2020-12-11 PROCEDURE — 83605 ASSAY OF LACTIC ACID: CPT

## 2020-12-11 PROCEDURE — 94003 VENT MGMT INPAT SUBQ DAY: CPT

## 2020-12-11 PROCEDURE — 71045 X-RAY EXAM CHEST 1 VIEW: CPT

## 2020-12-11 PROCEDURE — 6370000000 HC RX 637 (ALT 250 FOR IP): Performed by: STUDENT IN AN ORGANIZED HEALTH CARE EDUCATION/TRAINING PROGRAM

## 2020-12-11 RX ORDER — PIPERACILLIN SODIUM, TAZOBACTAM SODIUM 4; .5 G/20ML; G/20ML
4.5 INJECTION, POWDER, LYOPHILIZED, FOR SOLUTION INTRAVENOUS EVERY 8 HOURS
Qty: 67.5 G | Refills: 0
Start: 2020-12-11 | End: 2020-12-16

## 2020-12-11 RX ADMIN — OFLOXACIN 1 DROP: 3 SOLUTION OPHTHALMIC at 09:19

## 2020-12-11 RX ADMIN — QUETIAPINE FUMARATE 100 MG: 100 TABLET ORAL at 09:20

## 2020-12-11 RX ADMIN — PIPERACILLIN AND TAZOBACTAM 4.5 G: 4; .5 INJECTION, POWDER, LYOPHILIZED, FOR SOLUTION INTRAVENOUS; PARENTERAL at 04:25

## 2020-12-11 RX ADMIN — POTASSIUM BICARBONATE 40 MEQ: 782 TABLET, EFFERVESCENT ORAL at 09:19

## 2020-12-11 RX ADMIN — SODIUM CHLORIDE, PRESERVATIVE FREE 10 ML: 5 INJECTION INTRAVENOUS at 09:21

## 2020-12-11 RX ADMIN — ACETAMINOPHEN 1000 MG: 650 SOLUTION ORAL at 06:38

## 2020-12-11 RX ADMIN — CHLORHEXIDINE GLUCONATE 15 ML: 1.2 RINSE ORAL at 09:21

## 2020-12-11 RX ADMIN — ENOXAPARIN SODIUM 30 MG: 30 INJECTION SUBCUTANEOUS at 09:19

## 2020-12-11 RX ADMIN — ACETAMINOPHEN 1000 MG: 650 SOLUTION ORAL at 15:16

## 2020-12-11 RX ADMIN — OFLOXACIN 1 DROP: 3 SOLUTION OPHTHALMIC at 15:16

## 2020-12-11 RX ADMIN — PIPERACILLIN AND TAZOBACTAM 4.5 G: 4; .5 INJECTION, POWDER, LYOPHILIZED, FOR SOLUTION INTRAVENOUS; PARENTERAL at 11:46

## 2020-12-11 ASSESSMENT — PULMONARY FUNCTION TESTS
PIF_VALUE: 18
PIF_VALUE: 19
PIF_VALUE: 19
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
## 2020-12-11 NOTE — FLOWSHEET NOTE
DATE: 2020    NAME: Loren Valentine  MRN: [id number]   : 2000    Patient not seen this date for Physical Therapy due to:  [] Blood transfusion in progress  [] Hemodialysis  [x] Patient Declined: Leaving today to rehab   [] Spine Precautions   [] Strict Bedrest  [] Surgery/ Procedure  [] Testing      [] Other        [] PT is being discontinued at this time. Patient independent. No further needs. [] PT is being discontinued at this time due to declining physical/ medical status. Therapy is not appropriate at this time.     Momo Mclean, NAWAF

## 2020-12-11 NOTE — PLAN OF CARE
Problem: Falls - Risk of:  Goal: Will remain free from falls  Description: Will remain free from falls  Outcome: Ongoing  Goal: Absence of physical injury  Description: Absence of physical injury  Outcome: Ongoing     Problem: Skin Integrity:  Goal: Will show no infection signs and symptoms  Description: Will show no infection signs and symptoms  Outcome: Ongoing  Goal: Absence of new skin breakdown  Description: Absence of new skin breakdown  Outcome: Ongoing     Problem: Anxiety/Stress:  Goal: Level of anxiety will decrease  Description: Level of anxiety will decrease  Outcome: Ongoing     Problem: Cardiac Output - Decreased:  Goal: Hemodynamic stability will improve  Description: Hemodynamic stability will improve  Outcome: Ongoing     Problem: Pain:  Goal: Pain level will decrease  Description: Pain level will decrease  Outcome: Ongoing  Goal: Recognizes and communicates pain  Description: Recognizes and communicates pain  Outcome: Ongoing  Goal: Control of acute pain  Description: Control of acute pain  Outcome: Ongoing  Goal: Control of chronic pain  Description: Control of chronic pain  Outcome: Ongoing     Problem: Tissue Perfusion - Cardiopulmonary, Altered:  Goal: Hemodynamic stability will improve  Description: Hemodynamic stability will improve  Outcome: Ongoing     Problem: Nutrition  Goal: Optimal nutrition therapy  Description: Nutrition Problem #1: Inadequate oral intake  Intervention: Food and/or Nutrient Delivery: Continue NPO, Start Tube Feeding  Nutritional Goals: EN intake to meet % of estimated nutrition needs     12/11/2020 0306 by Kristopher Jerez RN  Outcome: Ongoing  12/10/2020 1307 by Zoila Bush RD, LD  Note: Nutrition Problem #1: Inadequate oral intake  Intervention: Food and/or Nutrient Delivery: Continue NPO, Continue Current Tube Feeding  Nutritional Goals: EN intake to meet % of estimated nutrition needs      Problem: OXYGENATION/RESPIRATORY FUNCTION  Goal: Patient will maintain patent airway  Outcome: Ongoing  Goal: Patient will achieve/maintain normal respiratory rate/effort  Description: Respiratory rate and effort will be within normal limits for the patient  Outcome: Ongoing     Problem: MECHANICAL VENTILATION  Goal: Patient will maintain patent airway  Outcome: Ongoing  Goal: Oral health is maintained or improved  Outcome: Ongoing  Goal: Ability to express needs and understand communication  Outcome: Ongoing  Goal: Mobility/activity is maintained at optimum level for patient  Outcome: Ongoing     Problem: SKIN INTEGRITY  Goal: Skin integrity is maintained or improved  Outcome: Ongoing     Problem: PAIN  Goal: STG - Patient will increase activity level  Outcome: Ongoing  Goal: STG - patient verbalizes a reduction in pain level  Outcome: Ongoing     Problem: Confusion - Acute:  Goal: Absence of continued neurological deterioration signs and symptoms  Description: Absence of continued neurological deterioration signs and symptoms  Outcome: Ongoing  Goal: Mental status will be restored to baseline  Description: Mental status will be restored to baseline  Outcome: Ongoing     Problem: Discharge Planning:  Goal: Ability to perform activities of daily living will improve  Description: Ability to perform activities of daily living will improve  Outcome: Ongoing  Goal: Participates in care planning  Description: Participates in care planning  Outcome: Ongoing     Problem: Injury - Risk of, Physical Injury:  Goal: Will remain free from falls  Description: Will remain free from falls  Outcome: Ongoing  Goal: Absence of physical injury  Description: Absence of physical injury  Outcome: Ongoing     Problem: Mood - Altered:  Goal: Mood stable  Description: Mood stable  Outcome: Ongoing  Goal: Absence of abusive behavior  Description: Absence of abusive behavior  Outcome: Ongoing  Goal: Verbalizations of feeling emotionally comfortable while being cared for will increase  Description: Verbalizations of feeling emotionally comfortable while being cared for will increase  Outcome: Ongoing     Problem: Psychomotor Activity - Altered:  Goal: Absence of psychomotor disturbance signs and symptoms  Description: Absence of psychomotor disturbance signs and symptoms  Outcome: Ongoing     Problem: Sensory Perception - Impaired:  Goal: Demonstrations of improved sensory functioning will increase  Description: Demonstrations of improved sensory functioning will increase  Outcome: Ongoing  Goal: Decrease in sensory misperception frequency  Description: Decrease in sensory misperception frequency  Outcome: Ongoing  Goal: Able to refrain from responding to false sensory perceptions  Description: Able to refrain from responding to false sensory perceptions  Outcome: Ongoing  Goal: Demonstrates accurate environmental perceptions  Description: Demonstrates accurate environmental perceptions  Outcome: Ongoing  Goal: Able to distinguish between reality-based and nonreality-based thinking  Description: Able to distinguish between reality-based and nonreality-based thinking  Outcome: Ongoing  Goal: Able to interrupt nonreality-based thinking  Description: Able to interrupt nonreality-based thinking  Outcome: Ongoing     Problem: Sleep Pattern Disturbance:  Goal: Appears well-rested  Description: Appears well-rested  Outcome: Ongoing
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
## 2020-12-14 ENCOUNTER — APPOINTMENT (OUTPATIENT)
Dept: CT IMAGING | Age: 20
End: 2020-12-14
Payer: MEDICAID

## 2020-12-14 ENCOUNTER — APPOINTMENT (OUTPATIENT)
Dept: GENERAL RADIOLOGY | Age: 20
End: 2020-12-14
Payer: MEDICAID

## 2020-12-14 ENCOUNTER — HOSPITAL ENCOUNTER (OUTPATIENT)
Age: 20
Setting detail: OBSERVATION
Discharge: NURSING, REHAB OR LONG-TERM CARE | End: 2020-12-15
Attending: EMERGENCY MEDICINE | Admitting: SURGERY
Payer: MEDICAID

## 2020-12-14 ENCOUNTER — APPOINTMENT (OUTPATIENT)
Dept: INTERVENTIONAL RADIOLOGY/VASCULAR | Age: 20
End: 2020-12-14
Payer: MEDICAID

## 2020-12-14 DIAGNOSIS — T85.528A DISLODGED GASTROSTOMY TUBE: Primary | ICD-10-CM

## 2020-12-14 LAB
SARS-COV-2, RAPID: NOT DETECTED
SARS-COV-2: NORMAL
SARS-COV-2: NORMAL
SOURCE: NORMAL

## 2020-12-14 PROCEDURE — G0378 HOSPITAL OBSERVATION PER HR: HCPCS

## 2020-12-14 PROCEDURE — 2709999900 HC NON-CHARGEABLE SUPPLY

## 2020-12-14 PROCEDURE — 6360000002 HC RX W HCPCS: Performed by: SURGERY

## 2020-12-14 PROCEDURE — C1769 GUIDE WIRE: HCPCS

## 2020-12-14 PROCEDURE — 6360000004 HC RX CONTRAST MEDICATION: Performed by: EMERGENCY MEDICINE

## 2020-12-14 PROCEDURE — U0002 COVID-19 LAB TEST NON-CDC: HCPCS

## 2020-12-14 PROCEDURE — 6360000002 HC RX W HCPCS: Performed by: STUDENT IN AN ORGANIZED HEALTH CARE EDUCATION/TRAINING PROGRAM

## 2020-12-14 PROCEDURE — C1887 CATHETER, GUIDING: HCPCS

## 2020-12-14 PROCEDURE — 74177 CT ABD & PELVIS W/CONTRAST: CPT

## 2020-12-14 PROCEDURE — 94770 HC ETCO2 MONITOR DAILY: CPT

## 2020-12-14 PROCEDURE — 76000 FLUOROSCOPY <1 HR PHYS/QHP: CPT

## 2020-12-14 PROCEDURE — 6360000004 HC RX CONTRAST MEDICATION: Performed by: SURGERY

## 2020-12-14 PROCEDURE — 2580000003 HC RX 258: Performed by: SURGERY

## 2020-12-14 PROCEDURE — 74018 RADEX ABDOMEN 1 VIEW: CPT

## 2020-12-14 PROCEDURE — 96372 THER/PROPH/DIAG INJ SC/IM: CPT

## 2020-12-14 PROCEDURE — 6370000000 HC RX 637 (ALT 250 FOR IP): Performed by: STUDENT IN AN ORGANIZED HEALTH CARE EDUCATION/TRAINING PROGRAM

## 2020-12-14 PROCEDURE — 94002 VENT MGMT INPAT INIT DAY: CPT

## 2020-12-14 PROCEDURE — 2580000003 HC RX 258: Performed by: STUDENT IN AN ORGANIZED HEALTH CARE EDUCATION/TRAINING PROGRAM

## 2020-12-14 PROCEDURE — U0003 INFECTIOUS AGENT DETECTION BY NUCLEIC ACID (DNA OR RNA); SEVERE ACUTE RESPIRATORY SYNDROME CORONAVIRUS 2 (SARS-COV-2) (CORONAVIRUS DISEASE [COVID-19]), AMPLIFIED PROBE TECHNIQUE, MAKING USE OF HIGH THROUGHPUT TECHNOLOGIES AS DESCRIBED BY CMS-2020-01-R: HCPCS

## 2020-12-14 PROCEDURE — 96366 THER/PROPH/DIAG IV INF ADDON: CPT

## 2020-12-14 PROCEDURE — 96365 THER/PROPH/DIAG IV INF INIT: CPT

## 2020-12-14 PROCEDURE — 99285 EMERGENCY DEPT VISIT HI MDM: CPT

## 2020-12-14 RX ORDER — BISACODYL 10 MG
10 SUPPOSITORY, RECTAL RECTAL DAILY
Status: DISCONTINUED | OUTPATIENT
Start: 2020-12-14 | End: 2020-12-16 | Stop reason: HOSPADM

## 2020-12-14 RX ORDER — SODIUM CHLORIDE 0.9 % (FLUSH) 0.9 %
10 SYRINGE (ML) INJECTION EVERY 12 HOURS SCHEDULED
Status: DISCONTINUED | OUTPATIENT
Start: 2020-12-14 | End: 2020-12-16 | Stop reason: HOSPADM

## 2020-12-14 RX ORDER — OFLOXACIN 3 MG/ML
1 SOLUTION/ DROPS OPHTHALMIC 3 TIMES DAILY
Status: DISCONTINUED | OUTPATIENT
Start: 2020-12-14 | End: 2020-12-16 | Stop reason: HOSPADM

## 2020-12-14 RX ORDER — SODIUM CHLORIDE 0.9 % (FLUSH) 0.9 %
10 SYRINGE (ML) INJECTION PRN
Status: DISCONTINUED | OUTPATIENT
Start: 2020-12-14 | End: 2020-12-16 | Stop reason: HOSPADM

## 2020-12-14 RX ORDER — ALBUTEROL SULFATE 2.5 MG/3ML
2.5 SOLUTION RESPIRATORY (INHALATION) EVERY 4 HOURS PRN
Status: DISCONTINUED | OUTPATIENT
Start: 2020-12-14 | End: 2020-12-16 | Stop reason: HOSPADM

## 2020-12-14 RX ORDER — ONDANSETRON 2 MG/ML
4 INJECTION INTRAMUSCULAR; INTRAVENOUS EVERY 6 HOURS PRN
Status: DISCONTINUED | OUTPATIENT
Start: 2020-12-14 | End: 2020-12-16 | Stop reason: HOSPADM

## 2020-12-14 RX ORDER — PIPERACILLIN SODIUM, TAZOBACTAM SODIUM 4; .5 G/20ML; G/20ML
4.5 INJECTION, POWDER, LYOPHILIZED, FOR SOLUTION INTRAVENOUS EVERY 8 HOURS
Status: DISCONTINUED | OUTPATIENT
Start: 2020-12-14 | End: 2020-12-14

## 2020-12-14 RX ORDER — SODIUM CHLORIDE, SODIUM LACTATE, POTASSIUM CHLORIDE, CALCIUM CHLORIDE 600; 310; 30; 20 MG/100ML; MG/100ML; MG/100ML; MG/100ML
INJECTION, SOLUTION INTRAVENOUS CONTINUOUS
Status: DISCONTINUED | OUTPATIENT
Start: 2020-12-14 | End: 2020-12-16 | Stop reason: HOSPADM

## 2020-12-14 RX ADMIN — SODIUM CHLORIDE, POTASSIUM CHLORIDE, SODIUM LACTATE AND CALCIUM CHLORIDE: 600; 310; 30; 20 INJECTION, SOLUTION INTRAVENOUS at 19:42

## 2020-12-14 RX ADMIN — SODIUM CHLORIDE, PRESERVATIVE FREE 10 ML: 5 INJECTION INTRAVENOUS at 20:18

## 2020-12-14 RX ADMIN — IOVERSOL 21 ML: 741 INJECTION INTRA-ARTERIAL; INTRAVENOUS at 17:42

## 2020-12-14 RX ADMIN — PIPERACILLIN AND TAZOBACTAM 3.38 G: 3; .375 INJECTION, POWDER, LYOPHILIZED, FOR SOLUTION INTRAVENOUS at 20:18

## 2020-12-14 RX ADMIN — IOPAMIDOL 75 ML: 755 INJECTION, SOLUTION INTRAVENOUS at 22:41

## 2020-12-14 RX ADMIN — OFLOXACIN 1 DROP: 3 SOLUTION OPHTHALMIC at 20:43

## 2020-12-14 RX ADMIN — ENOXAPARIN SODIUM 30 MG: 30 INJECTION SUBCUTANEOUS at 20:43

## 2020-12-14 ASSESSMENT — PULMONARY FUNCTION TESTS: PIF_VALUE: 16

## 2020-12-14 NOTE — ED NOTES
Pt resting in bed in NAD   Pt vented with trach   Pt on monitor   Will continue to assess      Emperatriz Boykin RN  12/14/20 1021
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
## 2020-12-14 NOTE — ED NOTES
Bed: 16  Expected date:   Expected time:   Means of arrival:   Comments:     Jenna Felipe RN  12/14/20 1200
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
## 2020-12-14 NOTE — SEDATION DOCUMENTATION
Unable to pass g-tube-  tract from existing g-tube was NOT able to be used for a NEW g -tube  Dr. Paradise Malcolm stated contact gen surg
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
## 2020-12-14 NOTE — BRIEF OP NOTE
Brief Postoperative Note    Greg Marino  YOB: 2000  [id number]    Pre-operative Diagnosis: dislodged PEG tube    Post-operative Diagnosis: Same    Procedure: attempted G tube replacement    Anesthesia: None    Surgeons/Assistants: SHIRLEY HIGGINS    Estimated Blood Loss: less than 1mL     Complications: None    Specimens: Was Not Obtained    Findings: the gastrostomy tract could not be identified, only a false tract into the peritoneal cavity was identified, gastrostomy tube replacement aborted.      Electronically signed by Gage Jarquin MD on 12/14/2020 at 5:41 PM

## 2020-12-14 NOTE — ED NOTES
Pt swabbed pt for COVID   Pt in NAD   Pt on monitor   Will continue to assess      Lashanda Vicente RN  12/14/20 8769
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
## 2020-12-14 NOTE — Clinical Note
Patient Class: Inpatient [101]   REQUIRED: Diagnosis: Dislodged gastrostomy tube [[id number]]   Estimated Length of Stay: Estimated stay of more than 2 midnights
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
## 2020-12-15 ENCOUNTER — APPOINTMENT (OUTPATIENT)
Dept: INTERVENTIONAL RADIOLOGY/VASCULAR | Age: 20
End: 2020-12-15
Payer: MEDICAID

## 2020-12-15 VITALS
OXYGEN SATURATION: 98 % | RESPIRATION RATE: 18 BRPM | DIASTOLIC BLOOD PRESSURE: 62 MMHG | HEART RATE: 79 BPM | SYSTOLIC BLOOD PRESSURE: 115 MMHG | TEMPERATURE: 100.3 F

## 2020-12-15 LAB
ABSOLUTE EOS #: <0.03 K/UL (ref 0–0.44)
ABSOLUTE IMMATURE GRANULOCYTE: 0.25 K/UL (ref 0–0.3)
ABSOLUTE LYMPH #: 1.68 K/UL (ref 1.1–3.7)
ABSOLUTE MONO #: 0.55 K/UL (ref 0.1–1.4)
ANION GAP SERPL CALCULATED.3IONS-SCNC: 10 MMOL/L (ref 9–17)
BASOPHILS # BLD: 0 % (ref 0–2)
BASOPHILS ABSOLUTE: 0.03 K/UL (ref 0–0.2)
BUN BLDV-MCNC: 27 MG/DL (ref 6–20)
BUN/CREAT BLD: ABNORMAL (ref 9–20)
CALCIUM SERPL-MCNC: 9 MG/DL (ref 8.6–10.4)
CHLORIDE BLD-SCNC: 104 MMOL/L (ref 98–107)
CO2: 22 MMOL/L (ref 20–31)
CREAT SERPL-MCNC: 0.59 MG/DL (ref 0.7–1.2)
DIFFERENTIAL TYPE: ABNORMAL
EOSINOPHILS RELATIVE PERCENT: 0 % (ref 1–4)
GFR AFRICAN AMERICAN: >60 ML/MIN
GFR NON-AFRICAN AMERICAN: >60 ML/MIN
GFR SERPL CREATININE-BSD FRML MDRD: ABNORMAL ML/MIN/{1.73_M2}
GFR SERPL CREATININE-BSD FRML MDRD: ABNORMAL ML/MIN/{1.73_M2}
GLUCOSE BLD-MCNC: 93 MG/DL (ref 70–99)
HCT VFR BLD CALC: 31.6 % (ref 40.7–50.3)
HEMOGLOBIN: 9.9 G/DL (ref 13–17)
IMMATURE GRANULOCYTES: 2 %
LYMPHOCYTES # BLD: 13 % (ref 25–45)
MCH RBC QN AUTO: 30.2 PG (ref 25.2–33.5)
MCHC RBC AUTO-ENTMCNC: 31.3 G/DL (ref 28.4–34.8)
MCV RBC AUTO: 96.3 FL (ref 82.6–102.9)
MONOCYTES # BLD: 4 % (ref 2–8)
NRBC AUTOMATED: 0 PER 100 WBC
PDW BLD-RTO: 15.6 % (ref 11.8–14.4)
PLATELET # BLD: ABNORMAL K/UL (ref 138–453)
PLATELET ESTIMATE: ABNORMAL
PLATELET, FLUORESCENCE: 462 K/UL (ref 138–453)
PLATELET, IMMATURE FRACTION: 2.2 % (ref 1.1–10.3)
PMV BLD AUTO: ABNORMAL FL (ref 8.1–13.5)
POTASSIUM SERPL-SCNC: 4.3 MMOL/L (ref 3.7–5.3)
RBC # BLD: 3.28 M/UL (ref 4.21–5.77)
RBC # BLD: ABNORMAL 10*6/UL
SEG NEUTROPHILS: 80 % (ref 34–64)
SEGMENTED NEUTROPHILS ABSOLUTE COUNT: 10.14 K/UL (ref 1.5–8.1)
SODIUM BLD-SCNC: 136 MMOL/L (ref 135–144)
WBC # BLD: 12.7 K/UL (ref 4.5–13.5)
WBC # BLD: ABNORMAL 10*3/UL

## 2020-12-15 PROCEDURE — 94770 HC ETCO2 MONITOR DAILY: CPT

## 2020-12-15 PROCEDURE — C1887 CATHETER, GUIDING: HCPCS

## 2020-12-15 PROCEDURE — 2580000003 HC RX 258: Performed by: STUDENT IN AN ORGANIZED HEALTH CARE EDUCATION/TRAINING PROGRAM

## 2020-12-15 PROCEDURE — 6360000004 HC RX CONTRAST MEDICATION: Performed by: SURGERY

## 2020-12-15 PROCEDURE — 80048 BASIC METABOLIC PNL TOTAL CA: CPT

## 2020-12-15 PROCEDURE — 2709999900 HC NON-CHARGEABLE SUPPLY

## 2020-12-15 PROCEDURE — 96375 TX/PRO/DX INJ NEW DRUG ADDON: CPT

## 2020-12-15 PROCEDURE — 94002 VENT MGMT INPAT INIT DAY: CPT

## 2020-12-15 PROCEDURE — 85025 COMPLETE CBC W/AUTO DIFF WBC: CPT

## 2020-12-15 PROCEDURE — 49440 PLACE GASTROSTOMY TUBE PERC: CPT

## 2020-12-15 PROCEDURE — C1892 INTRO/SHEATH,FIXED,PEEL-AWAY: HCPCS

## 2020-12-15 PROCEDURE — G0378 HOSPITAL OBSERVATION PER HR: HCPCS

## 2020-12-15 PROCEDURE — 85055 RETICULATED PLATELET ASSAY: CPT

## 2020-12-15 PROCEDURE — 96366 THER/PROPH/DIAG IV INF ADDON: CPT

## 2020-12-15 PROCEDURE — 2580000003 HC RX 258: Performed by: SURGERY

## 2020-12-15 PROCEDURE — 94003 VENT MGMT INPAT SUBQ DAY: CPT

## 2020-12-15 PROCEDURE — 6360000002 HC RX W HCPCS: Performed by: SURGERY

## 2020-12-15 PROCEDURE — 6360000002 HC RX W HCPCS: Performed by: STUDENT IN AN ORGANIZED HEALTH CARE EDUCATION/TRAINING PROGRAM

## 2020-12-15 RX ADMIN — PIPERACILLIN AND TAZOBACTAM 3.38 G: 3; .375 INJECTION, POWDER, LYOPHILIZED, FOR SOLUTION INTRAVENOUS at 04:14

## 2020-12-15 RX ADMIN — SODIUM CHLORIDE, PRESERVATIVE FREE 10 ML: 5 INJECTION INTRAVENOUS at 21:44

## 2020-12-15 RX ADMIN — ONDANSETRON 4 MG: 2 INJECTION INTRAMUSCULAR; INTRAVENOUS at 00:26

## 2020-12-15 RX ADMIN — IOVERSOL 10 ML: 741 INJECTION INTRA-ARTERIAL; INTRAVENOUS at 12:49

## 2020-12-15 RX ADMIN — OFLOXACIN 1 DROP: 3 SOLUTION OPHTHALMIC at 09:46

## 2020-12-15 ASSESSMENT — PULMONARY FUNCTION TESTS
PIF_VALUE: 17
PIF_VALUE: 19
PIF_VALUE: 20
PIF_VALUE: 16
PIF_VALUE: 16
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
## 2020-12-15 NOTE — ED NOTES
Pt to CT with writer and RT   Pt in NAD   Will continue to assess      Lashanda Vicente, RN  12/14/20 9919

## 2020-12-15 NOTE — BRIEF OP NOTE
Brief Postoperative Note     Martinez Cover  YOB: 2000  [id number]    Pre-operative Diagnosis: Dislodged gastrostomy tube    Post-operative Diagnosis: Same    Procedure: G-tube Insertion    Anesthesia: 1%Lidocaine Local Injection    Surgeons/Assistants: Tez    Estimated Blood Loss: Minimal    Complications: none    17 F JEREMY G-tube inserted under fluoro guidance. Tube tip in good position, and satisfactory function demonstrated. Will be ready for use in 24 hours if no peritoneal signs.     Electronically signed by JOANN Cam on 12/15/2020 at 12:25 PM

## 2020-12-15 NOTE — ED NOTES
Report received from Teo Neil. DESTINI. Pt appears to be anxious but in no distress at this time.    Call light within reach  Will continue to monitor     - DESTINI Groves  12/15/20 4648

## 2020-12-15 NOTE — ED NOTES
Pt FMS flushed and breathable pad changed, cooling measures applied   Family with pt in room  Will continue to monitor                    Sloane Badillo  12/15/20 3513

## 2020-12-15 NOTE — PROGRESS NOTES
12/15/20 1240   Patient Transport   Time spent transporting 31-45   Transport ventillation type Transport vent   Transport from [employer]   Transport destination Interventional Radiology   Transport destination ER   Patient Observation   Observations returned to ED from IR without incident with RN The EMR was down for 7 hours and 40 minutes on 4/17/2017 thru 4/18/2017.    Alycia Govea RN and Monty Yuen and Noemi Andersen RN and Vicki Shearer LPN was responsible for completing the paper charting during this time period.     The following information was re-entered into the system by Noemi Andersen: medications and Vital signs    The following information will remain in the paper chart: patient care flow sheet, I & O flow sheet, progress notes     Noemi Andersen  4/18/2017

## 2020-12-15 NOTE — ED NOTES
Pt family provided with d/c instructions, brief repot given to DESTINI Loya and [facility] called.  Pick-up set for [address] EL[address]  12/15/20 4432
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
## 2020-12-15 NOTE — ED NOTES
Report taken from Sharron, [facility]. Care assumed at this time. Pt placed in hospital bed and old linens are removed. NAD noted at this time.       Davonte Funez RN  12/15/20 0095

## 2020-12-15 NOTE — ED NOTES
Pt assisted with oral suctioning, pt given oral swabs to moisten lips/mouth, pts girlfriend remains at bedside     Anisha Schaffer RN  12/15/20 1387

## 2020-12-15 NOTE — PROGRESS NOTES
Physical Therapy  DATE: 12/15/2020    NAME: Shiloh Tate  MRN: [id number]   : 2000    Patient not seen this date for Physical Therapy due to:  [] Blood transfusion in progress  [] Hemodialysis  [] Patient Declined  [] Spine Precautions   [] Strict Bedrest  [x] Surgery/ Procedure: IR, g-tube placement  [] Testing      [] Other        [] PT is being discontinued at this time. Patient independent. No further needs. [] PT is being discontinued at this time due to declining physical/ medical status. Therapy is not appropriate at this time.     Luly Sood, PT

## 2020-12-15 NOTE — ED NOTES
Family at bedside and patient concerned with care at long term facility.   and MD notified      Ruth Ann Quarry  12/15/20 2977

## 2020-12-15 NOTE — ED NOTES
Pt in room in NAD, fluid restarted at 100 ml/hr  Will continue to monitor      Jase Chow  12/15/20 1012

## 2020-12-15 NOTE — ED NOTES
Pt's O2 near low 90s, high 80s. Writer calls RRT and is instructed in increasing O2 to 40%. Pt comes up to 95% via trach intubation. NAD noted. Pt in visual field of writer. Will continue to monitor.       Mark Lepe RN  12/15/20 0498

## 2020-12-15 NOTE — ED NOTES
Pt FMS flushed and breathable pad changed, cooling measures applied   Family with pt in room  Will continue to monitor         Rizwana Thompson  12/15/20 4824

## 2020-12-15 NOTE — PROGRESS NOTES
Occupational Therapy    Occupational Therapy Not Seen Note    DATE: 12/15/2020  Name: Meño Ro  : 2000  MRN: [id number]    Patient not available for Occupational Therapy due to:    Pt off floor for G tube insertion    Electronically signed by AMY Nicholson on 12/15/2020 at 2:40 PM

## 2020-12-15 NOTE — ED NOTES
Pt less agitated at this moment, not pulling at anything currently  Door open and pt observable. Will continue to monitor.      Britney Morris  12/15/20 0678

## 2020-12-15 NOTE — ED NOTES
Pt less agitated at this moment, respiratory at bedside addressing trach care. Door open and pt observable. Will continue to monitor.       Julio Heck  12/15/20 5989

## 2020-12-15 NOTE — ED NOTES
Bed: 40  Expected date:   Expected time:   Means of arrival:   Comments:  [address], [address] [place] [address]  12/14/20 9756

## 2020-12-15 NOTE — ED NOTES
Writer explains to pt the procedure of placing the NG tube. Pt allows writer to begin placing NG tube, then starts shaking his head, \"no,\" and tries to verbalize. Writer asks pt if he wants writer to continue. Pt shakes head \"no,\" again. Writer explains need for placement, and clarifies with pt, who still responds with no for placement. Pt refuses NG placement. Ordering team notified. NAD noted at this time.       Daniela Gibbs RN  12/15/20 5285

## 2020-12-15 NOTE — CARE COORDINATION
6115: informed that patient is ready to return to facility, call placed to [phone], left voice mail for Rishi Heck.

## 2020-12-15 NOTE — ED NOTES
Pt repositioned in bed, with soiled chux replaced and pt cleaned up. Pt's trach suctioned, mouth suctioned and moistened and wiped. Pt is still frustrated d/t not being able to communicate.       Alfred Tidwell RN  12/15/20 0062

## 2020-12-15 NOTE — ED NOTES
Referral sent to [address] [place] [address].        RONN Spicer, SHAHEENW     Asad Chen  12/15/20 8704

## 2020-12-16 ENCOUNTER — FOLLOWUP TELEPHONE ENCOUNTER (OUTPATIENT)
Dept: PSYCHIATRY | Age: 20
End: 2020-12-16

## 2020-12-16 NOTE — PLAN OF CARE
Problem: OXYGENATION/RESPIRATORY FUNCTION  Goal: Patient will maintain patent airway  Outcome: Ongoing  Goal: Patient will achieve/maintain normal respiratory rate/effort  Description: Respiratory rate and effort will be within normal limits for the patient  Outcome: Ongoing     Problem: MECHANICAL VENTILATION  Goal: Patient will maintain patent airway  Outcome: Ongoing  Goal: Oral health is maintained or improved  Outcome: Ongoing  Goal: Tracheostomy will be managed safely  Outcome: Ongoing  Goal: Ability to express needs and understand communication  Outcome: Ongoing  Goal: Mobility/activity is maintained at optimum level for patient  Outcome: Ongoing     Problem: SKIN INTEGRITY  Goal: Skin integrity is maintained or improved  Outcome: Ongoing
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
## 2021-01-12 ENCOUNTER — OFFICE VISIT (OUTPATIENT)
Dept: BURN CARE | Age: 21
End: 2021-01-12
Payer: MEDICAID

## 2021-01-12 VITALS
HEIGHT: 70 IN | TEMPERATURE: 97.3 F | HEART RATE: 73 BPM | BODY MASS INDEX: 22.62 KG/M2 | DIASTOLIC BLOOD PRESSURE: 68 MMHG | WEIGHT: 158 LBS | SYSTOLIC BLOOD PRESSURE: 110 MMHG

## 2021-01-12 DIAGNOSIS — S05.31XA RUPTURED GLOBE OF RIGHT EYE, INITIAL ENCOUNTER: ICD-10-CM

## 2021-01-12 DIAGNOSIS — S02.601A CLOSED FRACTURE OF RIGHT SIDE OF MANDIBULAR BODY, INITIAL ENCOUNTER (HCC): Primary | ICD-10-CM

## 2021-01-12 DIAGNOSIS — S02.85XA CLOSED FRACTURE OF ORBIT, INITIAL ENCOUNTER (HCC): ICD-10-CM

## 2021-01-12 PROCEDURE — 99024 POSTOP FOLLOW-UP VISIT: CPT | Performed by: PLASTIC SURGERY

## 2021-01-12 RX ORDER — QUETIAPINE FUMARATE 25 MG/1
25 TABLET, FILM COATED ORAL 2 TIMES DAILY
COMMUNITY

## 2021-01-12 RX ORDER — DULOXETIN HYDROCHLORIDE 60 MG/1
60 CAPSULE, DELAYED RELEASE ORAL DAILY
COMMUNITY

## 2021-01-12 RX ORDER — CIPROFLOXACIN HYDROCHLORIDE 3.5 MG/ML
2 SOLUTION/ DROPS TOPICAL
COMMUNITY

## 2021-01-12 RX ORDER — GABAPENTIN 300 MG/1
300 CAPSULE ORAL 3 TIMES DAILY
Status: ON HOLD | COMMUNITY
End: 2021-05-10 | Stop reason: SDUPTHER

## 2021-01-12 RX ORDER — FLUTICASONE PROPIONATE 50 MCG
1 SPRAY, SUSPENSION (ML) NASAL DAILY
COMMUNITY

## 2021-01-12 NOTE — PROGRESS NOTES
Medications   Medication Sig Dispense Refill    ciprofloxacin (CILOXAN) 0.3 % ophthalmic solution Place 2 drops into both eyes every 2 hours 2 DROPS FOUR TIMES A DAY      diclofenac sodium (VOLTAREN) 1 % GEL Apply topically 2 times daily      DULoxetine (CYMBALTA) 60 MG extended release capsule Take 60 mg by mouth daily TAKE 2 PILLS ONCE DAILY      fluticasone (FLONASE) 50 MCG/ACT nasal spray 1 spray by Each Nostril route daily      gabapentin (NEURONTIN) 300 MG capsule Take 300 mg by mouth 3 times daily. TAKE 2 PILLS THREE TIMES DAILY      QUEtiapine (SEROQUEL) 25 MG tablet Take 25 mg by mouth 2 times daily      enoxaparin (LOVENOX) 30 MG/0.3ML injection Inject 0.3 mLs into the skin 2 times daily  0    albuterol (PROVENTIL) (2.5 MG/3ML) 0.083% nebulizer solution Take 3 mLs by nebulization every 4 hours as needed for Wheezing 120 each 3     No current facility-administered medications for this visit. Allergies:    Patient has no known allergies.     Social History:   Social History     Socioeconomic History    Marital status: Unknown     Spouse name: Not on file    Number of children: Not on file    Years of education: Not on file    Highest education level: Not on file   Occupational History    Not on file   Social Needs    Financial resource strain: Not on file    Food insecurity     Worry: Not on file     Inability: Not on file    Transportation needs     Medical: Not on file     Non-medical: Not on file   Tobacco Use    Smoking status: Never Smoker    Smokeless tobacco: Never Used   Substance and Sexual Activity    Alcohol use: No    Drug use: Yes     Frequency: 1.0 times per week     Types: Marijuana    Sexual activity: Not on file   Lifestyle    Physical activity     Days per week: Not on file     Minutes per session: Not on file    Stress: Not on file   Relationships    Social connections     Talks on phone: Not on file     Gets together: Not on file     Attends Jehovah's witness service: Not on file     Active member of club or organization: Not on file     Attends meetings of clubs or organizations: Not on file     Relationship status: Not on file    Intimate partner violence     Fear of current or ex partner: Not on file     Emotionally abused: Not on file     Physically abused: Not on file     Forced sexual activity: Not on file   Other Topics Concern    Not on file   Social History Narrative    ** Merged History Encounter **         Lives with dad, sister, and grandma. In 9th grade. Family History:  Family History   Problem Relation Age of Onset    Asthma Paternal Uncle     High Blood Pressure Paternal Grandmother        Review of Systems   Unable to perform ROS: Patient nonverbal         PHYSICAL EXAM:  /68   Pulse 73   Temp 97.3 °F (36.3 °C) (Oral)   Ht 5' 10\" (1.778 m)   Wt 158 lb (71.7 kg)   BMI 22.67 kg/m²   CONSTITUTIONAL: awake, alert, cooperative, no apparent distress  Physical Exam  HENT:      Nose: Nose normal.      Mouth/Throat:      Comments: Class I occlusion. Rubber bands and wires intact. No erythema, drainage, induration, fluctuance noted  Eyes:      Comments: Wound noted to medial aspect right eyelid. Due to injury unable to open eyelid. Negative for any erythema, drainage, induration, fluctuance. Neck:      Musculoskeletal: Neck supple. Pulmonary:      Comments: PEG tube in place. Neurological:      Mental Status: He is alert. Mental status is at baseline. Radiology:   None taken at this visit    ASSESSMENT:     1. Closed fracture of right side of mandibular body, initial encounter ([facility]    2. Ruptured globe of right eye, initial encounter    3. Closed fracture of orbit, initial encounter [facility])         PLAN:  -Patient was seen and examined at bedside with Dr. Tay Devi  -Patient's mandible and right eyelid wound are progressing with proper postop course.   -Continue to keep areas clean dry and intact.  -Avoid direct sun exposure & stay well hydrated  -Take pain medication and as needed for pain control  -F/u one week for reevaluation    Daily Walsh DPM, PGY2  [facility], [place], [place]   9:49 AM 1/12/2021      Attending Physician Statement  I have discussed the case, including pertinent history and exam findings with the resident. I have seen and examined the patient and the key elements of all parts of the encounter have been performed by me. I agree with the assessment, plan and orders as documented by the resident.   Janee Alpers, MD on1/12/2021 on 10:09 AM

## 2021-01-18 ENCOUNTER — OFFICE VISIT (OUTPATIENT)
Dept: NEUROSURGERY | Age: 21
End: 2021-01-18

## 2021-01-18 VITALS
OXYGEN SATURATION: 94 % | HEART RATE: 93 BPM | WEIGHT: 158 LBS | HEIGHT: 70 IN | BODY MASS INDEX: 22.62 KG/M2 | DIASTOLIC BLOOD PRESSURE: 63 MMHG | SYSTOLIC BLOOD PRESSURE: 96 MMHG

## 2021-01-18 DIAGNOSIS — S14.115D: Primary | ICD-10-CM

## 2021-01-18 DIAGNOSIS — Z98.1 S/P CERVICAL SPINAL FUSION: ICD-10-CM

## 2021-01-18 PROCEDURE — 99024 POSTOP FOLLOW-UP VISIT: CPT | Performed by: NURSE PRACTITIONER

## 2021-01-18 RX ORDER — HYDROCODONE BITARTRATE AND ACETAMINOPHEN 5; 325 MG/1; MG/1
1 TABLET ORAL EVERY 6 HOURS PRN
Status: ON HOLD | COMMUNITY
End: 2021-02-09 | Stop reason: HOSPADM

## 2021-01-18 RX ORDER — ACETAMINOPHEN 650 MG/20.3ML
15 SUSPENSION ORAL EVERY 4 HOURS PRN
COMMUNITY

## 2021-01-18 NOTE — PROGRESS NOTES
movement/strength, no improvement in lower extremity sensation/strength. X-ray orders provided to transport staff to provide to facility upon return to obtain upright x-rays to evaluate progression of healing surgical site. Continue cervical collar at this time. Will need follow-up appointment in approximately 4 weeks with Dr. Syed Novoa. Continue PT/OT through rehab facility. Followup: Return in about 4 weeks (around 2/15/2021), or if symptoms worsen or fail to improve. Prescriptions Ordered:  No orders of the defined types were placed in this encounter. Orders Placed:  No orders of the defined types were placed in this encounter. Electronically signed by VIKKI Dietrich CNP on 1/18/2021 at 12:56 PM    Please note that this chart was generated using voice recognition Dragon dictation software. Although every effort was made to ensure the accuracy of this automated transcription, some errors in transcription may have occurred.

## 2021-01-19 ENCOUNTER — OFFICE VISIT (OUTPATIENT)
Dept: BURN CARE | Age: 21
End: 2021-01-19
Payer: MEDICAID

## 2021-01-19 ENCOUNTER — TELEPHONE (OUTPATIENT)
Dept: NEUROSURGERY | Age: 21
End: 2021-01-19

## 2021-01-19 VITALS
TEMPERATURE: 97.5 F | SYSTOLIC BLOOD PRESSURE: 111 MMHG | DIASTOLIC BLOOD PRESSURE: 72 MMHG | BODY MASS INDEX: 22.62 KG/M2 | WEIGHT: 158 LBS | HEIGHT: 70 IN | HEART RATE: 79 BPM

## 2021-01-19 DIAGNOSIS — S02.601D CLOSED FRACTURE OF RIGHT SIDE OF MANDIBULAR BODY WITH ROUTINE HEALING, SUBSEQUENT ENCOUNTER: Primary | ICD-10-CM

## 2021-01-19 DIAGNOSIS — S02.85XD CLOSED FRACTURE OF ORBIT WITH ROUTINE HEALING, SUBSEQUENT ENCOUNTER: ICD-10-CM

## 2021-01-19 PROCEDURE — 99024 POSTOP FOLLOW-UP VISIT: CPT | Performed by: PLASTIC SURGERY

## 2021-01-19 NOTE — TELEPHONE ENCOUNTER
Disk Drop Off    Disk dropped off on 1/19/21    Ask patient if they want disk back- yes      **telephone encounter to  be updated when disk is taken to radiology and upload is completed**

## 2021-01-19 NOTE — PATIENT INSTRUCTIONS
Wire and band removal from jaws -encouraged to open and close mouth as often as possible -will need surgery -[facility]    [phone]

## 2021-01-20 ENCOUNTER — TELEPHONE (OUTPATIENT)
Dept: BURN CARE | Age: 21
End: 2021-01-20

## 2021-01-28 NOTE — PROGRESS NOTES
Preoperative Instructions:    Stop eating solid foods at midnight/ no tube feedings the night prior to your surgery. Stop drinking clear liquids at midnight the night prior to your surgery. Arrive at the surgery center (C entrance) on _3-8-50______________ by _1000 __Wearing a mask._______ address is [address] [place] [address]____. Please stop any blood thinning medications as/if directed by your surgeon or prescribing physician. Failure to stop certain medications may interfere with your scheduled surgery. These may include: Aspirin, Coumadin, Plavix, NSAIDS (Motrin, Aleve, Advil, Mobic, Celebrex), Eliquis, Pradaxa, Xarelto, Fish oil, and herbal supplements. You may continue the rest of your medications through the night before surgery unless instructed otherwise. Day of surgery please take only the following medication(s) with a small  amts of water:[ to be determined once [facility] Record is confirmed]    Please give aerosol treatment morning of procedure  If scheduled. Please send with pt name and size of current trache. Reminders: Please provide name of transportation service and phone number. PLEASE send current [facility] record with pt the day of surgery with the last doses of medications administered marked. Please provide photo ID> any advance directives/ emergency contact name and phone number.    -If you are going home the same day of your surgery, someone must remain with you for the first 24 hours after your surgery if you receive sedation or anesthesia.      -Please do not wear any jewelery or body piercing the day of surgery

## 2021-01-28 NOTE — PROGRESS NOTES
Spoke with nurse Lesley Altamirano at [address] [place] [address] AND [facility]. Notified of scheduled surgery here. Date and arrival time. States pt is currently signing for himself and is alert and oriented and communicates verbally with a trache cap.  name obtained. Faxed copy of preop instructions found in focus note. Facility to provide transportation.  Requested copy of MAR>

## 2021-02-02 NOTE — PROGRESS NOTES
[facility] States pt recently [facility] there. Verified that staff aware of scheduled procedure here 2-9-21. Verified pt is alert and oriented and off vent/ signs for himself legally and communicates verbally with trache cap. Faxed preop instructions and requests for [facility] to  Lisandra Monterroso RN for review.

## 2021-02-04 ENCOUNTER — ANESTHESIA EVENT (OUTPATIENT)
Dept: OPERATING ROOM | Age: 21
End: 2021-02-04
Payer: COMMERCIAL

## 2021-02-04 ENCOUNTER — TELEPHONE (OUTPATIENT)
Dept: NEUROSURGERY | Age: 21
End: 2021-02-04

## 2021-02-04 NOTE — TELEPHONE ENCOUNTER
Dr Paulina Wood called asking if it was necessary for patient to have C collar 24 hrs a day or is it ok to take off in bed, showers etc. Writer spoke with Dr Carlos Alberto Rajput along with manager Alcon Weeks instructed it was ok to d/c the collar. Writer notified Dr Paulina Wood. Dr Paulina Wood acknowledged the order and that patient has follow up with [facility] 2.17.2021.

## 2021-02-05 NOTE — PROGRESS NOTES
Spoke with Amaya Solo -nurse caregiver at [facility] unit. Confirmed pt condition unchanged in past week. Confirmed Preop instructions received and arrival time NPO/no tube feedings after midnight. Verified size and type of trache. Requested copy of updated MAR asap for review. States pt has  Been refusing his LOVENOx there so not on any other bloodthinners. Facility to provide transportation.

## 2021-02-09 ENCOUNTER — ANESTHESIA (OUTPATIENT)
Dept: OPERATING ROOM | Age: 21
End: 2021-02-09
Payer: COMMERCIAL

## 2021-02-09 ENCOUNTER — HOSPITAL ENCOUNTER (OUTPATIENT)
Age: 21
Setting detail: OUTPATIENT SURGERY
Discharge: OTHER INSTITUTION | End: 2021-02-09
Attending: PLASTIC SURGERY | Admitting: PLASTIC SURGERY
Payer: COMMERCIAL

## 2021-02-09 VITALS
SYSTOLIC BLOOD PRESSURE: 122 MMHG | HEIGHT: 65 IN | DIASTOLIC BLOOD PRESSURE: 80 MMHG | BODY MASS INDEX: 18.31 KG/M2 | HEART RATE: 66 BPM | WEIGHT: 109.9 LBS | RESPIRATION RATE: 16 BRPM | OXYGEN SATURATION: 98 % | TEMPERATURE: 97.8 F

## 2021-02-09 VITALS
SYSTOLIC BLOOD PRESSURE: 119 MMHG | RESPIRATION RATE: 13 BRPM | OXYGEN SATURATION: 99 % | DIASTOLIC BLOOD PRESSURE: 61 MMHG

## 2021-02-09 DIAGNOSIS — G89.18 PAIN FOLLOWING SURGERY OR PROCEDURE: Primary | ICD-10-CM

## 2021-02-09 PROCEDURE — 2580000003 HC RX 258: Performed by: ANESTHESIOLOGY

## 2021-02-09 PROCEDURE — 3700000001 HC ADD 15 MINUTES (ANESTHESIA): Performed by: PLASTIC SURGERY

## 2021-02-09 PROCEDURE — 3700000000 HC ANESTHESIA ATTENDED CARE: Performed by: PLASTIC SURGERY

## 2021-02-09 PROCEDURE — 2709999900 HC NON-CHARGEABLE SUPPLY: Performed by: PLASTIC SURGERY

## 2021-02-09 PROCEDURE — 3600000013 HC SURGERY LEVEL 3 ADDTL 15MIN: Performed by: PLASTIC SURGERY

## 2021-02-09 PROCEDURE — 6360000002 HC RX W HCPCS: Performed by: NURSE ANESTHETIST, CERTIFIED REGISTERED

## 2021-02-09 PROCEDURE — 3600000003 HC SURGERY LEVEL 3 BASE: Performed by: PLASTIC SURGERY

## 2021-02-09 PROCEDURE — 2500000003 HC RX 250 WO HCPCS: Performed by: PLASTIC SURGERY

## 2021-02-09 PROCEDURE — 2500000003 HC RX 250 WO HCPCS: Performed by: NURSE ANESTHETIST, CERTIFIED REGISTERED

## 2021-02-09 PROCEDURE — 7100000010 HC PHASE II RECOVERY - FIRST 15 MIN: Performed by: PLASTIC SURGERY

## 2021-02-09 PROCEDURE — 7100000011 HC PHASE II RECOVERY - ADDTL 15 MIN: Performed by: PLASTIC SURGERY

## 2021-02-09 PROCEDURE — 7100000000 HC PACU RECOVERY - FIRST 15 MIN: Performed by: PLASTIC SURGERY

## 2021-02-09 RX ORDER — SODIUM CHLORIDE 0.9 % (FLUSH) 0.9 %
10 SYRINGE (ML) INJECTION EVERY 12 HOURS SCHEDULED
Status: DISCONTINUED | OUTPATIENT
Start: 2021-02-09 | End: 2021-02-09 | Stop reason: HOSPADM

## 2021-02-09 RX ORDER — FENTANYL CITRATE 50 UG/ML
25 INJECTION, SOLUTION INTRAMUSCULAR; INTRAVENOUS EVERY 5 MIN PRN
Status: DISCONTINUED | OUTPATIENT
Start: 2021-02-09 | End: 2021-02-09 | Stop reason: HOSPADM

## 2021-02-09 RX ORDER — SODIUM CHLORIDE, SODIUM LACTATE, POTASSIUM CHLORIDE, CALCIUM CHLORIDE 600; 310; 30; 20 MG/100ML; MG/100ML; MG/100ML; MG/100ML
INJECTION, SOLUTION INTRAVENOUS CONTINUOUS
Status: DISCONTINUED | OUTPATIENT
Start: 2021-02-09 | End: 2021-02-09 | Stop reason: HOSPADM

## 2021-02-09 RX ORDER — ONDANSETRON 2 MG/ML
4 INJECTION INTRAMUSCULAR; INTRAVENOUS
Status: DISCONTINUED | OUTPATIENT
Start: 2021-02-09 | End: 2021-02-09 | Stop reason: HOSPADM

## 2021-02-09 RX ORDER — PROMETHAZINE HYDROCHLORIDE 25 MG/ML
6.25 INJECTION, SOLUTION INTRAMUSCULAR; INTRAVENOUS
Status: DISCONTINUED | OUTPATIENT
Start: 2021-02-09 | End: 2021-02-09 | Stop reason: HOSPADM

## 2021-02-09 RX ORDER — HYDRALAZINE HYDROCHLORIDE 20 MG/ML
5 INJECTION INTRAMUSCULAR; INTRAVENOUS EVERY 10 MIN PRN
Status: DISCONTINUED | OUTPATIENT
Start: 2021-02-09 | End: 2021-02-09 | Stop reason: HOSPADM

## 2021-02-09 RX ORDER — HYDROCODONE BITARTRATE AND ACETAMINOPHEN 5; 325 MG/1; MG/1
2 TABLET ORAL PRN
Status: DISCONTINUED | OUTPATIENT
Start: 2021-02-09 | End: 2021-02-09 | Stop reason: HOSPADM

## 2021-02-09 RX ORDER — BUPIVACAINE HYDROCHLORIDE AND EPINEPHRINE 2.5; 5 MG/ML; UG/ML
INJECTION, SOLUTION EPIDURAL; INFILTRATION; INTRACAUDAL; PERINEURAL PRN
Status: DISCONTINUED | OUTPATIENT
Start: 2021-02-09 | End: 2021-02-09 | Stop reason: ALTCHOICE

## 2021-02-09 RX ORDER — DIPHENHYDRAMINE HYDROCHLORIDE 50 MG/ML
12.5 INJECTION INTRAMUSCULAR; INTRAVENOUS
Status: DISCONTINUED | OUTPATIENT
Start: 2021-02-09 | End: 2021-02-09 | Stop reason: HOSPADM

## 2021-02-09 RX ORDER — PROPOFOL 10 MG/ML
INJECTION, EMULSION INTRAVENOUS PRN
Status: DISCONTINUED | OUTPATIENT
Start: 2021-02-09 | End: 2021-02-09 | Stop reason: SDUPTHER

## 2021-02-09 RX ORDER — CEFAZOLIN SODIUM 1 G/3ML
INJECTION, POWDER, FOR SOLUTION INTRAMUSCULAR; INTRAVENOUS PRN
Status: DISCONTINUED | OUTPATIENT
Start: 2021-02-09 | End: 2021-02-09 | Stop reason: SDUPTHER

## 2021-02-09 RX ORDER — CEFAZOLIN SODIUM 2 G/50ML
SOLUTION INTRAVENOUS PRN
Status: DISCONTINUED | OUTPATIENT
Start: 2021-02-09 | End: 2021-02-09

## 2021-02-09 RX ORDER — HYDROCODONE BITARTRATE AND ACETAMINOPHEN 5; 325 MG/1; MG/1
1 TABLET ORAL PRN
Status: DISCONTINUED | OUTPATIENT
Start: 2021-02-09 | End: 2021-02-09 | Stop reason: HOSPADM

## 2021-02-09 RX ORDER — LIDOCAINE HYDROCHLORIDE 10 MG/ML
INJECTION, SOLUTION EPIDURAL; INFILTRATION; INTRACAUDAL; PERINEURAL PRN
Status: DISCONTINUED | OUTPATIENT
Start: 2021-02-09 | End: 2021-02-09 | Stop reason: SDUPTHER

## 2021-02-09 RX ORDER — SODIUM CHLORIDE 9 MG/ML
INJECTION, SOLUTION INTRAVENOUS CONTINUOUS
Status: DISCONTINUED | OUTPATIENT
Start: 2021-02-09 | End: 2021-02-09 | Stop reason: HOSPADM

## 2021-02-09 RX ORDER — MORPHINE SULFATE 1 MG/ML
1 INJECTION, SOLUTION EPIDURAL; INTRATHECAL; INTRAVENOUS EVERY 5 MIN PRN
Status: DISCONTINUED | OUTPATIENT
Start: 2021-02-09 | End: 2021-02-09 | Stop reason: HOSPADM

## 2021-02-09 RX ORDER — OXYCODONE HYDROCHLORIDE AND ACETAMINOPHEN 5; 325 MG/1; MG/1
1 TABLET ORAL EVERY 6 HOURS PRN
Qty: 20 TABLET | Refills: 0 | Status: SHIPPED | OUTPATIENT
Start: 2021-02-09 | End: 2021-02-16

## 2021-02-09 RX ORDER — MEPERIDINE HYDROCHLORIDE 50 MG/ML
12.5 INJECTION INTRAMUSCULAR; INTRAVENOUS; SUBCUTANEOUS EVERY 5 MIN PRN
Status: DISCONTINUED | OUTPATIENT
Start: 2021-02-09 | End: 2021-02-09 | Stop reason: HOSPADM

## 2021-02-09 RX ORDER — SODIUM CHLORIDE 0.9 % (FLUSH) 0.9 %
10 SYRINGE (ML) INJECTION PRN
Status: DISCONTINUED | OUTPATIENT
Start: 2021-02-09 | End: 2021-02-09 | Stop reason: HOSPADM

## 2021-02-09 RX ADMIN — PROPOFOL 100 MG: 10 INJECTION, EMULSION INTRAVENOUS at 12:02

## 2021-02-09 RX ADMIN — LIDOCAINE HYDROCHLORIDE 50 MG: 10 INJECTION, SOLUTION EPIDURAL; INFILTRATION; INTRACAUDAL; PERINEURAL at 12:02

## 2021-02-09 RX ADMIN — SODIUM CHLORIDE, POTASSIUM CHLORIDE, SODIUM LACTATE AND CALCIUM CHLORIDE: 600; 310; 30; 20 INJECTION, SOLUTION INTRAVENOUS at 12:01

## 2021-02-09 RX ADMIN — CEFAZOLIN 2000 MG: 1 INJECTION, POWDER, FOR SOLUTION INTRAMUSCULAR; INTRAVENOUS at 12:18

## 2021-02-09 ASSESSMENT — PULMONARY FUNCTION TESTS
PIF_VALUE: 10
PIF_VALUE: 10
PIF_VALUE: 3
PIF_VALUE: 2
PIF_VALUE: 17
PIF_VALUE: 0
PIF_VALUE: 11
PIF_VALUE: 2
PIF_VALUE: 11
PIF_VALUE: 10
PIF_VALUE: 9
PIF_VALUE: 3
PIF_VALUE: 10
PIF_VALUE: 0
PIF_VALUE: 10
PIF_VALUE: 9
PIF_VALUE: 10
PIF_VALUE: 2

## 2021-02-09 ASSESSMENT — PAIN - FUNCTIONAL ASSESSMENT: PAIN_FUNCTIONAL_ASSESSMENT: 0-10

## 2021-02-09 NOTE — OP NOTE
Operative Note      Patient: Rich Marinelli  YOB: 2000  MRN: [id number]    Date of Procedure: 2/9/2021    Pre-Op Diagnosis: JAW FRACTURE    Post-Op Diagnosis: Same       Procedure(s): MOUTH HARDWARE REMOVAL - HYBRID IMF    Surgeon(s): Mariely Corado MD    Assistant:  * No surgical staff found *    Anesthesia: General    Estimated Blood Loss (mL): Minimal    Complications: None    Specimens:   * No specimens in log *    Implants:  * No implants in log *      Drains:   NG/OG/NJ/NE Tube (Active)       Gastrostomy/Enterostomy/Jejunostomy   (Active)       Gastrostomy/Enterostomy/Jejunostomy Gastrostomy LUQ 1 18 fr (Active)       Gastrostomy/Enterostomy/Jejunostomy Gastrostomy LLQ 2 (Active)       Findings: Good occlusion postoperatively    Procedure: The patient was brought into the operating room and placed under general anesthesia. 0.25% Marcaine with epinephrine was injected into the mucosa overlying each of the buried screws. The mucosa overlying each of the buried screws was incised with a Jessie and the screw heads exposed. Each screw was removed in a counterclockwise fashion on both the upper and lower jaws. Once all the screws were removed the hybrid IMF bars were removed from the upper and lower jaws. Patient tolerated the procedure well. He appeared to continue to have good occlusion. He was taken to postop recovery in stable condition.
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
## 2021-02-09 NOTE — BRIEF OP NOTE
Brief Postoperative Note      Patient: Anibal Blount  YOB: 2000  MRN: [id number]    Date of Procedure: 2/9/2021    Pre-Op Diagnosis: JAW FRACTURE    Post-Op Diagnosis: Same       Procedure(s): MOUTH HARDWARE REMOVAL - HYBRID IMF    Surgeon(s):   Augusta Baron MD    Assistant:  * No surgical staff found *    Anesthesia: General    Estimated Blood Loss (mL): Minimal    Complications: None    Specimens:   * No specimens in log *    Implants:  * No implants in log *      Drains:   NG/OG/NJ/NE Tube (Active)       Gastrostomy/Enterostomy/Jejunostomy   (Active)       Gastrostomy/Enterostomy/Jejunostomy Gastrostomy LUQ 1 18 fr (Active)       Gastrostomy/Enterostomy/Jejunostomy Gastrostomy LLQ 2 (Active)       Findings: Good occlusion postoperatively    Electronically signed by Augusta Baron MD on 2/9/2021 at 12:33 PM

## 2021-02-09 NOTE — ANESTHESIA POSTPROCEDURE EVALUATION
POST- ANESTHESIA EVALUATION       Pt Name: Armando Hung  MRN: [id number]  [place]: 2000  Date of evaluation: 2/9/2021  Time:  1:35 PM      /80   Pulse 75   Temp 97.8 °F (36.6 °C) (Infrared)   Resp 16   Ht 5' 5\" (1.651 m)   Wt 109 lb 14.4 oz (49.9 kg)   SpO2 96%   BMI 18.29 kg/m²      Consciousness Level  Awake  Cardiopulmonary Status  Stable  Pain Adequately Treated YES  Nausea / Vomiting  NO  Adequate Hydration  YES  Anesthesia Related Complications NONE      Electronically signed by Nemesio Moore MD on 2/9/2021 at 1:35 PM       POST- ANESTHESIA EVALUATION       Pt Name: Armando Hung  MRN: [id number]  YOB: 2000  Date of evaluation: 2/9/2021  Time:  1:35 PM      /80   Pulse 75   Temp 97.8 °F (36.6 °C) (Infrared)   Resp 16   Ht 5' 5\" (1.651 m)   Wt 109 lb 14.4 oz (49.9 kg)   SpO2 96%   BMI 18.29 kg/m²      Consciousness Level  Awake  Cardiopulmonary Status  Stable  Pain Adequately Treated YES  Nausea / Vomiting  NO  Adequate Hydration  YES  Anesthesia Related Complications NONE      Electronically signed by Nemesio Moore MD on 2/9/2021 at 1:36 PM       Department of Anesthesiology  Postprocedure Note    Patient: Armando Hung  MRN: [id number]  YOB: 2000  Date of evaluation: 2/9/2021  Time:  1:35 PM     Procedure Summary     Date: 02/09/21 Room / Location: [address] / [address] [place] [address]    Anesthesia Start: 5916 Anesthesia Stop: 1944    Procedure: MOUTH HARDWARE REMOVAL - HYBRID IMF (N/A ) Diagnosis: (JAW FRACTURE)    Surgeons: Zena Navarro MD Responsible Provider: Nemesio Moore MD    Anesthesia Type: general ASA Status: 3          Anesthesia Type: general    June Phase I: June Score: 7    June Phase II: June Score: 9    Last vitals: Reviewed and per EMR flowsheets.        Anesthesia Post Evaluation
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
## 2021-02-15 LAB
SARS-COV-2, RAPID: NORMAL
SARS-COV-2: NORMAL
SARS-COV-2: NORMAL
SOURCE: NORMAL

## 2021-02-17 ENCOUNTER — HOSPITAL ENCOUNTER (OUTPATIENT)
Dept: GENERAL RADIOLOGY | Age: 21
Discharge: HOME | End: 2021-02-19
Payer: COMMERCIAL

## 2021-02-17 ENCOUNTER — OFFICE VISIT (OUTPATIENT)
Dept: NEUROSURGERY | Age: 21
End: 2021-02-17
Payer: MEDICAID

## 2021-02-17 ENCOUNTER — HOSPITAL ENCOUNTER (OUTPATIENT)
Age: 21
Discharge: HOME | End: 2021-02-19
Payer: COMMERCIAL

## 2021-02-17 VITALS
DIASTOLIC BLOOD PRESSURE: 65 MMHG | BODY MASS INDEX: 18.14 KG/M2 | HEART RATE: 102 BPM | WEIGHT: 109 LBS | SYSTOLIC BLOOD PRESSURE: 125 MMHG

## 2021-02-17 DIAGNOSIS — S12.600A CLOSED DISPLACED FRACTURE OF SEVENTH CERVICAL VERTEBRA, UNSPECIFIED FRACTURE MORPHOLOGY, INITIAL ENCOUNTER (HCC): ICD-10-CM

## 2021-02-17 DIAGNOSIS — S14.115D: Primary | ICD-10-CM

## 2021-02-17 DIAGNOSIS — M79.2 NEUROPATHIC PAIN OF FOREARM, LEFT: ICD-10-CM

## 2021-02-17 PROCEDURE — 99213 OFFICE O/P EST LOW 20 MIN: CPT | Performed by: NEUROLOGICAL SURGERY

## 2021-02-17 PROCEDURE — 72040 X-RAY EXAM NECK SPINE 2-3 VW: CPT

## 2021-02-17 RX ORDER — OXYCODONE AND ACETAMINOPHEN 10; 325 MG/1; MG/1
1 TABLET ORAL EVERY 6 HOURS PRN
Status: ON HOLD | COMMUNITY
End: 2021-05-10 | Stop reason: HOSPADM

## 2021-02-17 RX ORDER — PREDNISONE 20 MG/1
20 TABLET ORAL DAILY
Status: ON HOLD | COMMUNITY
End: 2021-05-10 | Stop reason: HOSPADM

## 2021-02-17 RX ORDER — LORAZEPAM 0.5 MG/1
0.5 TABLET ORAL EVERY 6 HOURS PRN
Status: ON HOLD | COMMUNITY
End: 2021-05-10 | Stop reason: HOSPADM

## 2021-02-17 RX ORDER — FAMOTIDINE 20 MG/1
20 TABLET, FILM COATED ORAL DAILY
COMMUNITY

## 2021-02-17 RX ORDER — ONDANSETRON 4 MG/1
4 TABLET, FILM COATED ORAL EVERY 8 HOURS PRN
COMMUNITY

## 2021-02-17 NOTE — PROGRESS NOTES
Sreekanth Ferrer  [facility] [address] [facility] [address]  Dept: [phone]    Patient:  Jazmin Flores  YOB: 2000  Date: 2/17/21    The patient is a 23 y.o. male who presents today for consult of the following problems:     Chief Complaint   Patient presents with    Post-Op Check    Results     X-rays             HPI:     Jazmin Flores is a 23 y.o. male on whom neurosurgical consultation was requested by No primary care provider on file. for management of penetrating cervical spinal cord injury. Patient with dysesthetic discomfort in the right forearm from approximately the hand to the proximal portion of the forearm just distal to the elbow on the right side. Significant atrophy but gross movement of the right upper extremity present. No left upper extremity dysesthetic pain discomfort. Approximate sensory level at T5-T6. Has not regained any bowel or bladder function or lower extremity movement. .      History:     No past medical history on file.   Past Surgical History:   Procedure Laterality Date    CERVICAL FUSION N/A 12/1/2020    C7, CORPECTOMY WITH LUMBAR DRAIN PLACEMENT performed by Georgie Felipe DO at [facility] N/A 12/3/2020    EGD PEG TUBE PLACEMENT performed by Johanne Johansen MD at [address] [place] [address]  02/09/2021    MOUTH HARDWARE REMOVAL - HYBRID IMF    HARDWARE REMOVAL N/A 2/9/2021    MOUTH HARDWARE REMOVAL - HYBRID IMF performed by Daniel Moreira MD at [facility]  12/14/2020    IR GASTROSTOMY TUBE PLACEMENT PERCUTANEOUS 12/14/2020 Aleena Mcdonnell MD [facility] SPECIAL PROCEDURES    IR GASTROSTOMY TUBE PLACEMENT PERCUTANEOUS  12/15/2020    IR GASTROSTOMY TUBE PLACEMENT PERCUTANE[facility]    MANDIBLE FRACTURE SURGERY N/A 12/3/2020    HYBRID IMF EXTERNAL FIXATOR DEVICE MANDIBLE, SHARP EXCISIONAL DEBRIDEMENT, REPAIR OF COMPLEX WOUND RIGHT EYELID performed by Dominguez Lopes MD at [address] N/A 12/3/2020    TRACHEOTOMY performed by Giuliana Joseph MD at [address] [place] [address]  12/1/2020    EGD ESOPHAGOGASTRODUODENOSCOPY performed by Braden Mayes DO at [facility] History   Problem Relation Age of Onset    Asthma Paternal Uncle     High Blood Pressure Paternal Grandmother      Current Outpatient Medications on File Prior to Visit   Medication Sig Dispense Refill    famotidine (PEPCID) 20 MG tablet Take 20 mg by mouth daily      predniSONE (DELTASONE) 20 MG tablet Take 20 mg by mouth daily      LORazepam (ATIVAN) 0.5 MG tablet Take 0.5 mg by mouth every 6 hours as needed for Anxiety.  oxyCODONE-acetaminophen (PERCOCET)  MG per tablet Take 1 tablet by mouth every 6 hours as needed for Pain.       ondansetron (ZOFRAN) 4 MG tablet Take 4 mg by mouth every 8 hours as needed for Nausea or Vomiting      acetaminophen (TYLENOL) 650 MG/20.3ML SUSP Take 15 mg/kg by mouth every 4 hours as needed for Pain      ciprofloxacin (CILOXAN) 0.3 % ophthalmic solution Place 2 drops into both eyes every 2 hours 2 DROPS FOUR TIMES A DAY      diclofenac sodium (VOLTAREN) 1 % GEL Apply topically 2 times daily      DULoxetine (CYMBALTA) 60 MG extended release capsule Take 60 mg by mouth daily TAKE 2 PILLS ONCE DAILY      fluticasone (FLONASE) 50 MCG/ACT nasal spray 1 spray by Each Nostril route daily      QUEtiapine (SEROQUEL) 25 MG tablet Take 25 mg by mouth 2 times daily      enoxaparin (LOVENOX) 30 MG/0.3ML injection Inject 0.3 mLs into the skin 2 times daily  0    albuterol (PROVENTIL) (2.5 MG/3ML) 0.083% nebulizer solution Take 3 mLs by nebulization every 4 hours as needed for Wheezing 120 each 3    miconazole nitrate 2 % OINT Apply topically 2 times daily      Glucagon HCl, rDNA, (GLUCAGEN IJ) Inject as directed  gabapentin (NEURONTIN) 300 MG capsule Take 300 mg by mouth 3 times daily. TAKE 2 PILLS THREE TIMES DAILY       No current facility-administered medications on file prior to visit. Social History     Tobacco Use    Smoking status: Never Smoker    Smokeless tobacco: Never Used   Substance Use Topics    Alcohol use: No    Drug use: Not Currently     Frequency: 1.0 times per week     Types: Marijuana     Comment: last time was late november 2020       No Known Allergies    Review of Systems  Constitutional: Negative for activity change and appetite change. HENT: Negative for ear pain and facial swelling. Eyes: Negative for discharge and itching. Respiratory: Negative for choking and chest tightness. Cardiovascular: Negative for chest pain and leg swelling. Gastrointestinal: Negative for nausea and abdominal pain. Endocrine: Negative for cold intolerance and heat intolerance. Genitourinary: Negative for frequency and flank pain. Musculoskeletal: Negative for myalgias and joint swelling. Skin: Negative for rash and wound. Allergic/Immunologic: Negative for environmental allergies and food allergies. Hematological: Negative for adenopathy. Does not bruise/bleed easily. Psychiatric/Behavioral: Negative for self-injury. The patient is not nervous/anxious. Physical Exam:      /65 (Site: Left Upper Arm, Position: Sitting, Cuff Size: Medium Adult)   Pulse 102   Wt 109 lb (49.4 kg) Comment: LKW  BMI 18.14 kg/m²   Estimated body mass index is 18.14 kg/m² as calculated from the following:    Height as of 2/9/21: 5' 5\" (1.651 m). Weight as of this encounter: 109 lb (49.4 kg). General:  Anibal Blount is a 23y.o. year old male who appears his stated age. HEENT: Normocephalic atraumatic. Neck supple. Chest: regular rate; pulses equal  Abdomen: Soft nontender nondistended. Normoactive bowel sounds.   Ext: DP and PT pulses 2+, good cap refill  Neuro    Mentation  Appropriate affect  Registration intact  Orientation intact  3 item recall intact  Judgement intact to situation    Cranial Nerves:   Pupils equal and reactive to light  Extraocular motion intact  Face and shrug symmetric  Tongue midline  No dysarthria  v1-3 sensation symmetric, masseter tone symmetric  Hearing symmetric and intact to finger rub    Sensation:   Sensory level approximately T5    Motor  Significant atrophy distal right upper extremity dorsal and volar  5 x 5 left upper extremity. Right upper extremity contracted with significant distal atrophy approximately 4 - biceps triceps. 3 out of 5 wrist extension. 1-2 out of 5 intrinsics. 0 out of 5 lower extremities. Sensory level T5. Diffuse lower extremity hyperreflexia and positive clonus. Studies Review:     Upright x-rays show stable hardware but I did able to see caudal end    Assessment and Plan:      1. Complete spinal cord injury of C5-C7 level without injury of spinal bone, subsequent encounter [facility])          Plan: We will obtain CT cervical spine to better evaluate graft placement any fusion as well as anterior plate. No restrictions  F/u 3mo    Followup: No follow-ups on file. Prescriptions Ordered:  No orders of the defined types were placed in this encounter. Orders Placed:  No orders of the defined types were placed in this encounter. Electronically signed by Mikael Gomez DO on 2/17/2021 at 9:53 AM    Please note that this chart was generated using voice recognition Dragon dictation software. Although every effort was made to ensure the accuracy of this automated transcription, some errors in transcription may have occurred.

## 2021-02-23 ENCOUNTER — HOSPITAL ENCOUNTER (OUTPATIENT)
Dept: WOUND CARE | Age: 21
Discharge: HOME | End: 2021-02-23
Payer: COMMERCIAL

## 2021-02-23 VITALS
BODY MASS INDEX: 18.16 KG/M2 | SYSTOLIC BLOOD PRESSURE: 105 MMHG | DIASTOLIC BLOOD PRESSURE: 52 MMHG | HEART RATE: 101 BPM | WEIGHT: 109 LBS | TEMPERATURE: 98.1 F | HEIGHT: 65 IN

## 2021-02-23 DIAGNOSIS — S14.115S: ICD-10-CM

## 2021-02-23 DIAGNOSIS — L89.153 PRESSURE INJURY OF COCCYGEAL REGION, STAGE 3 (HCC): Primary | ICD-10-CM

## 2021-02-23 PROCEDURE — 11042 DBRDMT SUBQ TIS 1ST 20SQCM/<: CPT | Performed by: NURSE PRACTITIONER

## 2021-02-23 PROCEDURE — 99203 OFFICE O/P NEW LOW 30 MIN: CPT | Performed by: NURSE PRACTITIONER

## 2021-02-23 PROCEDURE — 11042 DBRDMT SUBQ TIS 1ST 20SQCM/<: CPT

## 2021-02-23 PROCEDURE — 99214 OFFICE O/P EST MOD 30 MIN: CPT

## 2021-02-23 RX ORDER — DOCUSATE SODIUM 100 MG/1
100 CAPSULE, LIQUID FILLED ORAL 3 TIMES DAILY
COMMUNITY

## 2021-02-23 RX ORDER — LIDOCAINE HYDROCHLORIDE 20 MG/ML
JELLY TOPICAL ONCE
Status: CANCELLED | OUTPATIENT
Start: 2021-02-23 | End: 2021-02-23

## 2021-02-23 RX ORDER — LIDOCAINE HYDROCHLORIDE 40 MG/ML
SOLUTION TOPICAL ONCE
Status: CANCELLED | OUTPATIENT
Start: 2021-02-23 | End: 2021-02-23

## 2021-02-23 RX ORDER — NALOXONE HYDROCHLORIDE 0.4 MG/ML
0.4 INJECTION, SOLUTION INTRAMUSCULAR; INTRAVENOUS; SUBCUTANEOUS PRN
COMMUNITY

## 2021-02-23 RX ORDER — POLYETHYLENE GLYCOL 3350 17 G/17G
17 POWDER, FOR SOLUTION ORAL DAILY PRN
COMMUNITY

## 2021-02-23 RX ORDER — LIDOCAINE 40 MG/G
CREAM TOPICAL ONCE
Status: CANCELLED | OUTPATIENT
Start: 2021-02-23 | End: 2021-02-23

## 2021-02-23 RX ORDER — ZONISAMIDE 100 MG/1
100 CAPSULE ORAL DAILY
COMMUNITY

## 2021-02-23 RX ORDER — POLYVINYL ALCOHOL 14 MG/ML
1 SOLUTION/ DROPS OPHTHALMIC 4 TIMES DAILY PRN
COMMUNITY

## 2021-02-23 RX ORDER — MAGNESIUM HYDROXIDE/ALUMINUM HYDROXICE/SIMETHICONE 120; 1200; 1200 MG/30ML; MG/30ML; MG/30ML
5 SUSPENSION ORAL EVERY 6 HOURS PRN
COMMUNITY

## 2021-02-23 RX ORDER — MELOXICAM 7.5 MG/1
15 TABLET ORAL DAILY
COMMUNITY

## 2021-02-23 RX ORDER — LIDOCAINE 50 MG/G
OINTMENT TOPICAL ONCE
Status: CANCELLED | OUTPATIENT
Start: 2021-02-23 | End: 2021-02-23

## 2021-02-23 RX ORDER — LANOLIN ALCOHOL/MO/W.PET/CERES
3 CREAM (GRAM) TOPICAL NIGHTLY
COMMUNITY

## 2021-02-23 RX ORDER — GUAIFENESIN 100 MG/5ML
100 SOLUTION ORAL 2 TIMES DAILY PRN
COMMUNITY

## 2021-02-23 ASSESSMENT — PAIN SCALES - GENERAL: PAINLEVEL_OUTOF10: 0
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
## 2021-02-23 NOTE — PLAN OF CARE
Problem: Wound:  Goal: Will show signs of wound healing; wound closure and no evidence of infection  Description: Will show signs of wound healing; wound closure and no evidence of infection  Outcome: Ongoing     Problem: Pressure Ulcer:  Goal: Signs of wound healing will improve  Description: Signs of wound healing will improve  Outcome: Ongoing  Goal: Absence of new pressure ulcer  Description: Absence of new pressure ulcer  Outcome: Ongoing  Goal: Will show no infection signs and symptoms  Description: Will show no infection signs and symptoms  Outcome: Ongoing     Problem: Falls - Risk of:  Goal: Will remain free from falls  Description: Will remain free from falls  Outcome: Ongoing

## 2021-03-02 ENCOUNTER — HOSPITAL ENCOUNTER (OUTPATIENT)
Dept: WOUND CARE | Age: 21
Discharge: HOME | End: 2021-03-02

## 2021-03-08 ENCOUNTER — TELEPHONE (OUTPATIENT)
Dept: WOUND CARE | Age: 21
End: 2021-03-08

## 2021-03-19 ENCOUNTER — APPOINTMENT (OUTPATIENT)
Dept: GENERAL RADIOLOGY | Age: 21
DRG: 466 | End: 2021-03-19
Payer: MEDICAID

## 2021-03-19 ENCOUNTER — HOSPITAL ENCOUNTER (INPATIENT)
Age: 21
LOS: 5 days | Discharge: HOME HEALTH | DRG: 466 | End: 2021-03-24
Attending: EMERGENCY MEDICINE | Admitting: INTERNAL MEDICINE
Payer: MEDICAID

## 2021-03-19 ENCOUNTER — TELEPHONE (OUTPATIENT)
Dept: INTERVENTIONAL RADIOLOGY/VASCULAR | Age: 21
End: 2021-03-19

## 2021-03-19 DIAGNOSIS — A41.9 SEPTICEMIA (HCC): Primary | ICD-10-CM

## 2021-03-19 PROBLEM — N39.0 SEPSIS DUE TO URINARY TRACT INFECTION (HCC): Status: ACTIVE | Noted: 2021-03-19

## 2021-03-19 LAB
-: ABNORMAL
ABSOLUTE EOS #: 0 K/UL (ref 0–0.4)
ABSOLUTE IMMATURE GRANULOCYTE: 0 K/UL (ref 0–0.3)
ABSOLUTE LYMPH #: 2.46 K/UL (ref 1–4.8)
ABSOLUTE MONO #: 1.97 K/UL (ref 0.1–1.4)
ALBUMIN SERPL-MCNC: 3.1 G/DL (ref 3.5–5.2)
ALBUMIN/GLOBULIN RATIO: 0.7 (ref 1–2.5)
ALP BLD-CCNC: 114 U/L (ref 40–129)
ALT SERPL-CCNC: 47 U/L (ref 5–41)
AMORPHOUS: ABNORMAL
ANION GAP SERPL CALCULATED.3IONS-SCNC: 13 MMOL/L (ref 9–17)
AST SERPL-CCNC: 36 U/L
BACTERIA: ABNORMAL
BASOPHILS # BLD: 0 % (ref 0–2)
BASOPHILS ABSOLUTE: 0 K/UL (ref 0–0.2)
BILIRUB SERPL-MCNC: 0.55 MG/DL (ref 0.3–1.2)
BILIRUBIN URINE: NEGATIVE
BUN BLDV-MCNC: 13 MG/DL (ref 6–20)
BUN/CREAT BLD: ABNORMAL (ref 9–20)
CALCIUM SERPL-MCNC: 8.9 MG/DL (ref 8.6–10.4)
CASTS UA: ABNORMAL /LPF (ref 0–2)
CHLORIDE BLD-SCNC: 91 MMOL/L (ref 98–107)
CO2: 27 MMOL/L (ref 20–31)
COLOR: YELLOW
COMMENT UA: ABNORMAL
CREAT SERPL-MCNC: 0.34 MG/DL (ref 0.7–1.2)
CRYSTALS, UA: ABNORMAL /HPF
DIFFERENTIAL TYPE: ABNORMAL
EOSINOPHILS RELATIVE PERCENT: 0 % (ref 1–4)
EPITHELIAL CELLS UA: ABNORMAL /HPF (ref 0–5)
GFR AFRICAN AMERICAN: >60 ML/MIN
GFR NON-AFRICAN AMERICAN: >60 ML/MIN
GFR SERPL CREATININE-BSD FRML MDRD: ABNORMAL ML/MIN/{1.73_M2}
GFR SERPL CREATININE-BSD FRML MDRD: ABNORMAL ML/MIN/{1.73_M2}
GLUCOSE BLD-MCNC: 132 MG/DL (ref 70–99)
GLUCOSE URINE: NEGATIVE
HCT VFR BLD CALC: 36.2 % (ref 40.7–50.3)
HEMOGLOBIN: 11.7 G/DL (ref 13–17)
IMMATURE GRANULOCYTES: 0 %
INR BLD: 1.1
KETONES, URINE: NEGATIVE
LACTIC ACID, SEPSIS WHOLE BLOOD: 1.1 MMOL/L (ref 0.5–1.9)
LACTIC ACID, SEPSIS WHOLE BLOOD: 2.3 MMOL/L (ref 0.5–1.9)
LACTIC ACID, SEPSIS: ABNORMAL MMOL/L (ref 0.5–1.9)
LACTIC ACID, SEPSIS: NORMAL MMOL/L (ref 0.5–1.9)
LEUKOCYTE ESTERASE, URINE: ABNORMAL
LIPASE: 14 U/L (ref 13–60)
LYMPHOCYTES # BLD: 10 % (ref 25–45)
MCH RBC QN AUTO: 28.5 PG (ref 25.2–33.5)
MCHC RBC AUTO-ENTMCNC: 32.3 G/DL (ref 28.4–34.8)
MCV RBC AUTO: 88.3 FL (ref 82.6–102.9)
MONOCYTES # BLD: 8 % (ref 2–8)
MORPHOLOGY: NORMAL
MUCUS: ABNORMAL
NITRITE, URINE: NEGATIVE
NRBC AUTOMATED: 0 PER 100 WBC
OTHER OBSERVATIONS UA: ABNORMAL
PARTIAL THROMBOPLASTIN TIME: 26 SEC (ref 20.5–30.5)
PDW BLD-RTO: 14.9 % (ref 11.8–14.4)
PH UA: 7 (ref 5–8)
PLATELET # BLD: 245 K/UL (ref 138–453)
PLATELET ESTIMATE: ABNORMAL
PMV BLD AUTO: 10.9 FL (ref 8.1–13.5)
POTASSIUM SERPL-SCNC: 4.1 MMOL/L (ref 3.7–5.3)
PROTEIN UA: ABNORMAL
PROTHROMBIN TIME: 11.3 SEC (ref 9.1–12.3)
RBC # BLD: 4.1 M/UL (ref 4.21–5.77)
RBC # BLD: ABNORMAL 10*6/UL
RBC UA: ABNORMAL /HPF (ref 0–2)
RENAL EPITHELIAL, UA: ABNORMAL /HPF
SEG NEUTROPHILS: 82 % (ref 34–64)
SEGMENTED NEUTROPHILS ABSOLUTE COUNT: 20.17 K/UL (ref 1.8–7.7)
SODIUM BLD-SCNC: 131 MMOL/L (ref 135–144)
SPECIFIC GRAVITY UA: 1.02 (ref 1–1.03)
TOTAL PROTEIN: 7.4 G/DL (ref 6.4–8.3)
TRICHOMONAS: ABNORMAL
TROPONIN INTERP: NORMAL
TROPONIN INTERP: NORMAL
TROPONIN T: NORMAL NG/ML
TROPONIN T: NORMAL NG/ML
TROPONIN, HIGH SENSITIVITY: 16 NG/L (ref 0–22)
TROPONIN, HIGH SENSITIVITY: 20 NG/L (ref 0–22)
TURBIDITY: ABNORMAL
URINE HGB: ABNORMAL
UROBILINOGEN, URINE: NORMAL
WBC # BLD: 24.6 K/UL (ref 4.5–13.5)
WBC # BLD: ABNORMAL 10*3/UL
WBC UA: ABNORMAL /HPF (ref 0–5)
YEAST: ABNORMAL

## 2021-03-19 PROCEDURE — 2580000003 HC RX 258: Performed by: NURSE PRACTITIONER

## 2021-03-19 PROCEDURE — 85730 THROMBOPLASTIN TIME PARTIAL: CPT

## 2021-03-19 PROCEDURE — 2580000003 HC RX 258: Performed by: STUDENT IN AN ORGANIZED HEALTH CARE EDUCATION/TRAINING PROGRAM

## 2021-03-19 PROCEDURE — 6370000000 HC RX 637 (ALT 250 FOR IP): Performed by: NURSE PRACTITIONER

## 2021-03-19 PROCEDURE — 80053 COMPREHEN METABOLIC PANEL: CPT

## 2021-03-19 PROCEDURE — 83690 ASSAY OF LIPASE: CPT

## 2021-03-19 PROCEDURE — 93005 ELECTROCARDIOGRAM TRACING: CPT | Performed by: STUDENT IN AN ORGANIZED HEALTH CARE EDUCATION/TRAINING PROGRAM

## 2021-03-19 PROCEDURE — 96375 TX/PRO/DX INJ NEW DRUG ADDON: CPT

## 2021-03-19 PROCEDURE — 99285 EMERGENCY DEPT VISIT HI MDM: CPT

## 2021-03-19 PROCEDURE — 81001 URINALYSIS AUTO W/SCOPE: CPT

## 2021-03-19 PROCEDURE — 6370000000 HC RX 637 (ALT 250 FOR IP): Performed by: STUDENT IN AN ORGANIZED HEALTH CARE EDUCATION/TRAINING PROGRAM

## 2021-03-19 PROCEDURE — 6360000002 HC RX W HCPCS: Performed by: STUDENT IN AN ORGANIZED HEALTH CARE EDUCATION/TRAINING PROGRAM

## 2021-03-19 PROCEDURE — 83605 ASSAY OF LACTIC ACID: CPT

## 2021-03-19 PROCEDURE — 85025 COMPLETE CBC W/AUTO DIFF WBC: CPT

## 2021-03-19 PROCEDURE — 87040 BLOOD CULTURE FOR BACTERIA: CPT

## 2021-03-19 PROCEDURE — 85610 PROTHROMBIN TIME: CPT

## 2021-03-19 PROCEDURE — 96365 THER/PROPH/DIAG IV INF INIT: CPT

## 2021-03-19 PROCEDURE — 71045 X-RAY EXAM CHEST 1 VIEW: CPT

## 2021-03-19 PROCEDURE — 2060000000 HC ICU INTERMEDIATE R&B

## 2021-03-19 PROCEDURE — 84484 ASSAY OF TROPONIN QUANT: CPT

## 2021-03-19 PROCEDURE — 99222 1ST HOSP IP/OBS MODERATE 55: CPT | Performed by: NURSE PRACTITIONER

## 2021-03-19 RX ORDER — ALBUTEROL SULFATE 2.5 MG/3ML
2.5 SOLUTION RESPIRATORY (INHALATION) EVERY 4 HOURS PRN
Status: DISCONTINUED | OUTPATIENT
Start: 2021-03-19 | End: 2021-03-24 | Stop reason: HOSPADM

## 2021-03-19 RX ORDER — ACETAMINOPHEN 500 MG
1000 TABLET ORAL ONCE
Status: COMPLETED | OUTPATIENT
Start: 2021-03-19 | End: 2021-03-19

## 2021-03-19 RX ORDER — ACETAMINOPHEN 325 MG/1
650 TABLET ORAL EVERY 6 HOURS PRN
Status: DISCONTINUED | OUTPATIENT
Start: 2021-03-19 | End: 2021-03-24 | Stop reason: HOSPADM

## 2021-03-19 RX ORDER — 0.9 % SODIUM CHLORIDE 0.9 %
1000 INTRAVENOUS SOLUTION INTRAVENOUS ONCE
Status: COMPLETED | OUTPATIENT
Start: 2021-03-19 | End: 2021-03-19

## 2021-03-19 RX ORDER — POLYVINYL ALCOHOL 14 MG/ML
1 SOLUTION/ DROPS OPHTHALMIC 4 TIMES DAILY PRN
Status: DISCONTINUED | OUTPATIENT
Start: 2021-03-19 | End: 2021-03-24 | Stop reason: HOSPADM

## 2021-03-19 RX ORDER — GABAPENTIN 300 MG/1
300 CAPSULE ORAL 3 TIMES DAILY
Status: DISCONTINUED | OUTPATIENT
Start: 2021-03-19 | End: 2021-03-24 | Stop reason: HOSPADM

## 2021-03-19 RX ORDER — POLYETHYLENE GLYCOL 3350 17 G/17G
17 POWDER, FOR SOLUTION ORAL DAILY PRN
Status: DISCONTINUED | OUTPATIENT
Start: 2021-03-19 | End: 2021-03-20

## 2021-03-19 RX ORDER — PROMETHAZINE HYDROCHLORIDE 12.5 MG/1
12.5 TABLET ORAL EVERY 6 HOURS PRN
Status: DISCONTINUED | OUTPATIENT
Start: 2021-03-19 | End: 2021-03-24 | Stop reason: HOSPADM

## 2021-03-19 RX ORDER — ZONISAMIDE 100 MG/1
100 CAPSULE ORAL DAILY
Status: DISCONTINUED | OUTPATIENT
Start: 2021-03-20 | End: 2021-03-24 | Stop reason: HOSPADM

## 2021-03-19 RX ORDER — FAMOTIDINE 20 MG/1
20 TABLET, FILM COATED ORAL DAILY
Status: DISCONTINUED | OUTPATIENT
Start: 2021-03-20 | End: 2021-03-24 | Stop reason: HOSPADM

## 2021-03-19 RX ORDER — ONDANSETRON 2 MG/ML
4 INJECTION INTRAMUSCULAR; INTRAVENOUS EVERY 6 HOURS PRN
Status: DISCONTINUED | OUTPATIENT
Start: 2021-03-19 | End: 2021-03-24 | Stop reason: HOSPADM

## 2021-03-19 RX ORDER — SODIUM CHLORIDE 9 MG/ML
INJECTION, SOLUTION INTRAVENOUS CONTINUOUS
Status: DISCONTINUED | OUTPATIENT
Start: 2021-03-19 | End: 2021-03-24 | Stop reason: HOSPADM

## 2021-03-19 RX ORDER — ECHINACEA PURPUREA EXTRACT 125 MG
1 TABLET ORAL 2 TIMES DAILY PRN
Status: DISCONTINUED | OUTPATIENT
Start: 2021-03-19 | End: 2021-03-24 | Stop reason: HOSPADM

## 2021-03-19 RX ORDER — GUAIFENESIN 100 MG/5ML
100 SOLUTION ORAL 2 TIMES DAILY PRN
Status: DISCONTINUED | OUTPATIENT
Start: 2021-03-19 | End: 2021-03-24 | Stop reason: HOSPADM

## 2021-03-19 RX ORDER — DULOXETIN HYDROCHLORIDE 30 MG/1
60 CAPSULE, DELAYED RELEASE ORAL DAILY
Status: DISCONTINUED | OUTPATIENT
Start: 2021-03-20 | End: 2021-03-24 | Stop reason: HOSPADM

## 2021-03-19 RX ORDER — ACETAMINOPHEN 650 MG/1
650 SUPPOSITORY RECTAL EVERY 6 HOURS PRN
Status: DISCONTINUED | OUTPATIENT
Start: 2021-03-19 | End: 2021-03-24 | Stop reason: HOSPADM

## 2021-03-19 RX ORDER — FLUTICASONE PROPIONATE 50 MCG
1 SPRAY, SUSPENSION (ML) NASAL DAILY
Status: DISCONTINUED | OUTPATIENT
Start: 2021-03-20 | End: 2021-03-24 | Stop reason: HOSPADM

## 2021-03-19 RX ORDER — QUETIAPINE FUMARATE 25 MG/1
25 TABLET, FILM COATED ORAL 2 TIMES DAILY
Status: DISCONTINUED | OUTPATIENT
Start: 2021-03-19 | End: 2021-03-24 | Stop reason: HOSPADM

## 2021-03-19 RX ORDER — NICOTINE 21 MG/24HR
1 PATCH, TRANSDERMAL 24 HOURS TRANSDERMAL DAILY PRN
Status: DISCONTINUED | OUTPATIENT
Start: 2021-03-19 | End: 2021-03-24 | Stop reason: HOSPADM

## 2021-03-19 RX ADMIN — SODIUM CHLORIDE: 9 INJECTION, SOLUTION INTRAVENOUS at 22:36

## 2021-03-19 RX ADMIN — QUETIAPINE FUMARATE 25 MG: 25 TABLET ORAL at 23:42

## 2021-03-19 RX ADMIN — ACETAMINOPHEN 1000 MG: 500 TABLET ORAL at 22:08

## 2021-03-19 RX ADMIN — PIPERACILLIN AND TAZOBACTAM 4500 MG: 4; .5 INJECTION, POWDER, LYOPHILIZED, FOR SOLUTION INTRAVENOUS; PARENTERAL at 19:02

## 2021-03-19 RX ADMIN — Medication 1250 MG: at 20:15

## 2021-03-19 RX ADMIN — GABAPENTIN 300 MG: 300 CAPSULE ORAL at 23:42

## 2021-03-19 RX ADMIN — SODIUM CHLORIDE 1000 ML: 9 INJECTION, SOLUTION INTRAVENOUS at 18:30

## 2021-03-19 ASSESSMENT — PAIN DESCRIPTION - ORIENTATION: ORIENTATION: RIGHT

## 2021-03-19 ASSESSMENT — PAIN SCALES - GENERAL: PAINLEVEL_OUTOF10: 10
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
## 2021-03-19 NOTE — Clinical Note
Patient Class: Inpatient [101]   REQUIRED: Diagnosis: Sepsis ([facility] [address] [[id number]]   Estimated Length of Stay: Estimated stay of more than 2 midnights   Admitting Provider: Victor Manuel Henao [[id number]]   Telemetry Bed Required?: Yes

## 2021-03-19 NOTE — ED PROVIDER NOTES
[facility]  Emergency Department  Faculty Attestation     I performed a history and physical examination of the patient and discussed management with the resident. I reviewed the residents note and agree with the documented findings and plan of care. Any areas of disagreement are noted on the chart. I was personally present for the key portions of any procedures. I have documented in the chart those procedures where I was not present during the key portions. I have reviewed the emergency nurses triage note. I agree with the chief complaint, past medical history, past surgical history, allergies, medications, social and family history as documented unless otherwise noted below. For Physician Assistant/ Nurse Practitioner cases/documentation I have personally evaluated this patient and have completed at least one if not all key elements of the E/M (history, physical exam, and MDM). Additional findings are as noted. Primary Care Physician:  No primary care provider on file. Screenings:  [unfilled]    CHIEF COMPLAINT       Chief Complaint   Patient presents with    Fatigue       RECENT VITALS:   Temp: 100.4 °F (38 °C),  Pulse: 133, Resp: 20, BP: 104/70    LABS:  Labs Reviewed   CULTURE, URINE   CBC WITH AUTO DIFFERENTIAL   COMPREHENSIVE METABOLIC PANEL W/ REFLEX TO MG FOR LOW K   LIPASE   TROPONIN   PROTIME-INR   APTT   URINALYSIS   LACTATE, SEPSIS   LACTATE, SEPSIS       Radiology  XR CHEST PORTABLE    (Results Pending)       CRITICAL CARE: There was a high probability of clinically significant/life threatening deterioration in this patient's condition which required my urgent intervention. Total critical care time was none minutes. This excludes any time for separately reportable procedures.      EKG:   EKG Interpretation    Interpreted by me    Rhythm: normal sinus   Rate: Tachycardia  Axis: normal  Ectopy: none  Conduction: normal  ST Segments: Depression inferiorly and laterally  T Waves: Inversion inferiorly and anteriorly  Q Waves: none    Clinical Impression: Nonspecific ST/T wave changes    Attending Physician Additional  Notes    Patient is brought for evaluation of fever. He has decreased energy, difficulty coughing, but no cough. He has a large worsening sacral decubitus ulceration. There is no odor. He has indwelling Serrano catheter and G-tube. On exam he is febrile tachycardic and ill-appearing. He is anicteric. Lungs are diminished but clear and symmetrical.  Abdomen is without tympany or distention. Sacral decubitus evaluated by resident and nursing staff and patient is declining repeat exam.  Impression is probable sepsis, dehydration. Plan is IV access, labs, fluids, antibiotics, reassess, admission. Savannah Lantigua.  Mark Guerra MD, [facility]  Attending Emergency  Physician               Jeison Matos MD  03/19/21 0073

## 2021-03-20 ENCOUNTER — APPOINTMENT (OUTPATIENT)
Dept: CT IMAGING | Age: 21
DRG: 466 | End: 2021-03-20
Payer: MEDICAID

## 2021-03-20 PROBLEM — K59.01 SLOW TRANSIT CONSTIPATION: Status: ACTIVE | Noted: 2021-03-20

## 2021-03-20 PROBLEM — I82.411 ACUTE DEEP VEIN THROMBOSIS (DVT) OF FEMORAL VEIN OF RIGHT LOWER EXTREMITY (HCC): Status: ACTIVE | Noted: 2021-03-20

## 2021-03-20 PROBLEM — Z93.59 CHRONIC SUPRAPUBIC CATHETER (HCC): Status: ACTIVE | Noted: 2021-03-20

## 2021-03-20 LAB
ANION GAP SERPL CALCULATED.3IONS-SCNC: 13 MMOL/L (ref 9–17)
BUN BLDV-MCNC: 8 MG/DL (ref 6–20)
BUN/CREAT BLD: ABNORMAL (ref 9–20)
CALCIUM SERPL-MCNC: 8.2 MG/DL (ref 8.6–10.4)
CHLORIDE BLD-SCNC: 93 MMOL/L (ref 98–107)
CO2: 23 MMOL/L (ref 20–31)
CREAT SERPL-MCNC: 0.26 MG/DL (ref 0.7–1.2)
EKG ATRIAL RATE: 137 BPM
EKG P AXIS: 56 DEGREES
EKG P-R INTERVAL: 114 MS
EKG Q-T INTERVAL: 270 MS
EKG QRS DURATION: 80 MS
EKG QTC CALCULATION (BAZETT): 407 MS
EKG R AXIS: 59 DEGREES
EKG T AXIS: -30 DEGREES
EKG VENTRICULAR RATE: 137 BPM
GFR AFRICAN AMERICAN: >60 ML/MIN
GFR NON-AFRICAN AMERICAN: >60 ML/MIN
GFR SERPL CREATININE-BSD FRML MDRD: ABNORMAL ML/MIN/{1.73_M2}
GFR SERPL CREATININE-BSD FRML MDRD: ABNORMAL ML/MIN/{1.73_M2}
GLUCOSE BLD-MCNC: 102 MG/DL (ref 70–99)
LACTIC ACID, SEPSIS WHOLE BLOOD: 1.9 MMOL/L (ref 0.5–1.9)
LACTIC ACID, SEPSIS: NORMAL MMOL/L (ref 0.5–1.9)
PARTIAL THROMBOPLASTIN TIME: 26.1 SEC (ref 20.5–30.5)
POTASSIUM SERPL-SCNC: 3.5 MMOL/L (ref 3.7–5.3)
SODIUM BLD-SCNC: 129 MMOL/L (ref 135–144)

## 2021-03-20 PROCEDURE — 93971 EXTREMITY STUDY: CPT

## 2021-03-20 PROCEDURE — 6360000002 HC RX W HCPCS: Performed by: STUDENT IN AN ORGANIZED HEALTH CARE EDUCATION/TRAINING PROGRAM

## 2021-03-20 PROCEDURE — 83605 ASSAY OF LACTIC ACID: CPT

## 2021-03-20 PROCEDURE — 36415 COLL VENOUS BLD VENIPUNCTURE: CPT

## 2021-03-20 PROCEDURE — 6360000002 HC RX W HCPCS: Performed by: NURSE PRACTITIONER

## 2021-03-20 PROCEDURE — 6370000000 HC RX 637 (ALT 250 FOR IP): Performed by: NURSE PRACTITIONER

## 2021-03-20 PROCEDURE — 2580000003 HC RX 258: Performed by: NURSE PRACTITIONER

## 2021-03-20 PROCEDURE — 99254 IP/OBS CNSLTJ NEW/EST MOD 60: CPT | Performed by: INTERNAL MEDICINE

## 2021-03-20 PROCEDURE — 74176 CT ABD & PELVIS W/O CONTRAST: CPT

## 2021-03-20 PROCEDURE — 97162 PT EVAL MOD COMPLEX 30 MIN: CPT

## 2021-03-20 PROCEDURE — 99254 IP/OBS CNSLTJ NEW/EST MOD 60: CPT | Performed by: SURGERY

## 2021-03-20 PROCEDURE — 2060000000 HC ICU INTERMEDIATE R&B

## 2021-03-20 PROCEDURE — 97530 THERAPEUTIC ACTIVITIES: CPT

## 2021-03-20 PROCEDURE — 6370000000 HC RX 637 (ALT 250 FOR IP): Performed by: INTERNAL MEDICINE

## 2021-03-20 PROCEDURE — 80048 BASIC METABOLIC PNL TOTAL CA: CPT

## 2021-03-20 PROCEDURE — 87077 CULTURE AEROBIC IDENTIFY: CPT

## 2021-03-20 PROCEDURE — 6370000000 HC RX 637 (ALT 250 FOR IP): Performed by: STUDENT IN AN ORGANIZED HEALTH CARE EDUCATION/TRAINING PROGRAM

## 2021-03-20 PROCEDURE — 93010 ELECTROCARDIOGRAM REPORT: CPT | Performed by: INTERNAL MEDICINE

## 2021-03-20 PROCEDURE — 99233 SBSQ HOSP IP/OBS HIGH 50: CPT | Performed by: INTERNAL MEDICINE

## 2021-03-20 PROCEDURE — 87086 URINE CULTURE/COLONY COUNT: CPT

## 2021-03-20 PROCEDURE — 85730 THROMBOPLASTIN TIME PARTIAL: CPT

## 2021-03-20 PROCEDURE — 2580000003 HC RX 258: Performed by: FAMILY MEDICINE

## 2021-03-20 PROCEDURE — 6360000002 HC RX W HCPCS: Performed by: FAMILY MEDICINE

## 2021-03-20 PROCEDURE — 87186 SC STD MICRODIL/AGAR DIL: CPT

## 2021-03-20 RX ORDER — HEPARIN SODIUM 1000 [USP'U]/ML
80 INJECTION, SOLUTION INTRAVENOUS; SUBCUTANEOUS PRN
Status: DISCONTINUED | OUTPATIENT
Start: 2021-03-20 | End: 2021-03-20 | Stop reason: ALTCHOICE

## 2021-03-20 RX ORDER — OXYBUTYNIN CHLORIDE 10 MG/1
10 TABLET, EXTENDED RELEASE ORAL NIGHTLY
Status: DISCONTINUED | OUTPATIENT
Start: 2021-03-20 | End: 2021-03-21

## 2021-03-20 RX ORDER — POLYETHYLENE GLYCOL 3350 17 G/17G
17 POWDER, FOR SOLUTION ORAL DAILY
Status: DISCONTINUED | OUTPATIENT
Start: 2021-03-20 | End: 2021-03-24 | Stop reason: HOSPADM

## 2021-03-20 RX ORDER — TIZANIDINE 4 MG/1
4 TABLET ORAL EVERY 6 HOURS PRN
Status: DISCONTINUED | OUTPATIENT
Start: 2021-03-20 | End: 2021-03-24 | Stop reason: HOSPADM

## 2021-03-20 RX ORDER — HEPARIN SODIUM 1000 [USP'U]/ML
80 INJECTION, SOLUTION INTRAVENOUS; SUBCUTANEOUS ONCE
Status: DISCONTINUED | OUTPATIENT
Start: 2021-03-20 | End: 2021-03-23

## 2021-03-20 RX ORDER — HEPARIN SODIUM 10000 [USP'U]/100ML
5-30 INJECTION, SOLUTION INTRAVENOUS CONTINUOUS
Status: DISCONTINUED | OUTPATIENT
Start: 2021-03-20 | End: 2021-03-20

## 2021-03-20 RX ORDER — DOCUSATE SODIUM 100 MG/1
100 CAPSULE, LIQUID FILLED ORAL DAILY
Status: DISCONTINUED | OUTPATIENT
Start: 2021-03-20 | End: 2021-03-24 | Stop reason: HOSPADM

## 2021-03-20 RX ORDER — HEPARIN SODIUM 1000 [USP'U]/ML
40 INJECTION, SOLUTION INTRAVENOUS; SUBCUTANEOUS PRN
Status: DISCONTINUED | OUTPATIENT
Start: 2021-03-20 | End: 2021-03-20 | Stop reason: ALTCHOICE

## 2021-03-20 RX ADMIN — PIPERACILLIN AND TAZOBACTAM 3375 MG: 3; .375 INJECTION, POWDER, FOR SOLUTION INTRAVENOUS at 10:43

## 2021-03-20 RX ADMIN — TIZANIDINE 4 MG: 4 TABLET ORAL at 16:19

## 2021-03-20 RX ADMIN — SODIUM CHLORIDE: 9 INJECTION, SOLUTION INTRAVENOUS at 17:32

## 2021-03-20 RX ADMIN — ENOXAPARIN SODIUM 40 MG: 40 INJECTION SUBCUTANEOUS at 08:33

## 2021-03-20 RX ADMIN — PIPERACILLIN AND TAZOBACTAM 3375 MG: 3; .375 INJECTION, POWDER, FOR SOLUTION INTRAVENOUS at 21:48

## 2021-03-20 RX ADMIN — ZONISAMIDE 100 MG: 100 CAPSULE ORAL at 08:34

## 2021-03-20 RX ADMIN — GABAPENTIN 300 MG: 300 CAPSULE ORAL at 21:34

## 2021-03-20 RX ADMIN — FAMOTIDINE 20 MG: 20 TABLET, FILM COATED ORAL at 08:34

## 2021-03-20 RX ADMIN — OXYBUTYNIN CHLORIDE 10 MG: 10 TABLET, EXTENDED RELEASE ORAL at 21:34

## 2021-03-20 RX ADMIN — DULOXETINE HYDROCHLORIDE 60 MG: 30 CAPSULE, DELAYED RELEASE ORAL at 08:33

## 2021-03-20 RX ADMIN — GABAPENTIN 300 MG: 300 CAPSULE ORAL at 14:48

## 2021-03-20 RX ADMIN — QUETIAPINE FUMARATE 25 MG: 25 TABLET ORAL at 08:33

## 2021-03-20 RX ADMIN — ENOXAPARIN SODIUM 50 MG: 60 INJECTION SUBCUTANEOUS at 21:34

## 2021-03-20 RX ADMIN — PIPERACILLIN AND TAZOBACTAM 3375 MG: 3; .375 INJECTION, POWDER, FOR SOLUTION INTRAVENOUS at 02:26

## 2021-03-20 RX ADMIN — VANCOMYCIN HYDROCHLORIDE 750 MG: 1 INJECTION, POWDER, LYOPHILIZED, FOR SOLUTION INTRAVENOUS at 06:39

## 2021-03-20 RX ADMIN — ACETAMINOPHEN 650 MG: 325 TABLET ORAL at 23:55

## 2021-03-20 RX ADMIN — GABAPENTIN 300 MG: 300 CAPSULE ORAL at 08:34

## 2021-03-20 ASSESSMENT — PAIN SCALES - GENERAL
PAINLEVEL_OUTOF10: 0
PAINLEVEL_OUTOF10: 0
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
## 2021-03-20 NOTE — PROGRESS NOTES
Physical Therapy    Facility/Department: [facility] CAR 2  Initial Assessment    NAME: Taylor Medrano  : 2000  MRN: [id number]    Date of Service: 3/20/2021    Discharge Recommendations:  Patient would benefit from continued therapy after discharge       Assessment   Assessment: Pt is 24 y/o male with LE paralysis and bilateral UE's weakness. Requires mod to max A +2 for bed mobility. Pt will benefit from PT. Treatment Diagnosis: general weakness  Prognosis: Fair  Decision Making: Medium Complexity  Patient Education: PT eval and POC  REQUIRES PT FOLLOW UP: Yes  Activity Tolerance  Activity Tolerance: Patient Tolerated treatment well       Patient Diagnosis(es): The encounter diagnosis was Septicemia ([facility] [address] has no past medical history on file. has a past surgical history that includes cervical fusion (N/A, 2020); Upper gastrointestinal endoscopy (2020); Mandible fracture surgery (N/A, 12/3/2020); tracheostomy (N/A, 12/3/2020); Gastrostomy tube placement (N/A, 12/3/2020); IR FLUORO GUIDED GASTROSTOMY TUBE INSERTION PERC W CONTRAST (2020); IR FLUORO GUIDED GASTROSTOMY TUBE INSERTION PERC W CONTRAST (12/15/2020); Hardware Removal (2021); and Hardware Removal (N/A, 2021). Restrictions  Restrictions/Precautions  Restrictions/Precautions: General Precautions, Fall Risk     Vision/Hearing  Vision: Impaired  Vision Exceptions: (blind right eye)  Hearing: Within functional limits       Subjective  General  Chart Reviewed: Yes  Family / Caregiver Present: No  Follows Commands: Within Functional Limits  Subjective  Subjective: Doesn't feel well. Pain Screening  Patient Currently in Pain: Denies    Orientation  Orientation  Overall Orientation Status: Within Normal Limits     Social/Functional History  Social/Functional History  Lives With: Family  Type of Home: House  Additional Comments: Pt reluctant to answer questions regarding home living.  States he has a WC but has been looking into getting a new one. States has not been in [facility] lately due to seizures. Has aides who come to the home but pt states he may be stopping their services soon. Pt states family with lift him to move bed/chair; does not use slide board. Cognition   Cognition  Overall Cognitive Status: WNL    Objective  PROM RLE (degrees)  RLE PROM: WFL  RLE General PROM: foot drop splint donned; lacks full knee ext  PROM LLE (degrees)  LLE PROM: WFL  LLE General PROM: foot drop splint donned; lacks full knee ext     Strength RLE  Comment: grossly 0/5  Strength LLE  Comment: grossly 0/5  Strength RUE  Comment: shoulder and elbow: demo's at least 3+/5; wrist 0/5  Strength LUE  Comment: grossly 4- to 4/5     Bed mobility  Rolling to Left: Moderate assistance;Maximum assistance;2 Person assistance  Rolling to Right: Moderate assistance;2 Person assistance;Maximum assistance  Supine to Sit: (pt declined)     Balance  Sitting - Static: (pt declined EOB)        Plan   Plan  Times per week: 3-5 x week  Times per day: Daily  Current Treatment Recommendations: Strengthening, Neuromuscular Re-education, Home Exercise Program, Safety Education & Training, ROM, Balance Training, Endurance Training, Functional Mobility Training, Transfer Training  Safety Devices  Type of devices: All fall risk precautions in place      AM-PAC Score  AM-PAC Inpatient Mobility without Stair Climbing Raw Score : 8 (03/20/21 1113)  AM-PAC Inpatient without Stair Climbing T-Scale Score : 30.65 (03/20/21 1113)  Mobility Inpatient CMS 0-100% Score: 80.91 (03/20/21 1113)  Mobility Inpatient without Stair CMS G-Code Modifier : CM (03/20/21 1113)       Goals  Short term goals  Time Frame for Short term goals: 14 days  Short term goal 1: Pt to receive PROM bilateral LE's to maintain ROM and prevent contractures. Short term goal 2: Pt to transfer supine to sit with mod/max A +1. Short term goal 3: Pt to demonstrate good/fair sitting balance for decrease fall risk.   Patient

## 2021-03-20 NOTE — CARE COORDINATION
Case Management Initial Discharge Plan  Marc Braden,             Met with:patient & s.o. to discuss discharge plans. Information verified: address on facesheet is dad's address, contacts, phone number, , insurance Yes    Emergency Contact/Next of Kin name & number: parent sam    PCP: Dr. Mary Todd. Date of last visit: 2 days ago    Insurance Provider: [place] medicaid     Discharge Planning    Living Arrangements:    lives with s.o. [address] [place] [address]       Home is an apartment   No  stairs to climb to get into front door,     Patient able to perform ADL's:Assisted    Current Services (outpatient & in home) [facility] wound care   DME equipment: [employer]  [employer] lift, hospital bed   wc  Working on getting power wc     Receiving oral anticoagulation therapy? No        Potential Assistance Needed:   f/u pcp, resume home care     Patient agreeable to home care: Yes, [facility], pt mwf, ot 2x wk, nsg 2 x wk   Freedom of choice provided:  yes    Prior SNF/Rehab Placement and Facility[facility]        Evaluation: no    Expected Discharge date:   3/24    Patient expects to be discharged to:   home  Follow Up Appointment: Best Day/ Time:  afternoon    Transportation provider: family   Transportation arrangements needed for discharge: No    Readmission Risk              Risk of Unplanned Readmission:        19             Does patient have a readmission risk score greater than 14?: Yes  If yes, follow-up appointment must be made within 7 days of discharge.      Goals of Care: treat infection       Discharge Plan: return home with s.o. current with [facility]           Electronically signed by Tico Thornton RN on 3/20/21 at 4:43 PM EDT
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
## 2021-03-20 NOTE — FLOWSHEET NOTE
was rounding on the floor and initiated a visit. When asked how he was, he shook his head no. When asked if he would like to talk about this, he said no. The  let S[place] Mcinty[place] know that we have chaplains here 24/7, so if he did want to talk to someone he could.  Patient thanked the .        03/20/21 1542   Encounter Summary   Services provided to: Patient   Referral/Consult From: Rounding   Support System Unknown   Continue Visiting   (3/20/2021)   Complexity of Encounter Low   Length of Encounter 15 minutes   Spiritual Assessment Completed Yes   Routine   Type Initial   Assessment Passive   Intervention Sustaining presence/ Ministry of presence   Outcome Expressed gratitude

## 2021-03-20 NOTE — PLAN OF CARE
Nutrition Problem #1: Increased nutrient needs  Intervention: Food and/or Nutrient Delivery: Continue Current Diet, Start Oral Nutrition Supplement  Nutritional Goals: Meet greater than or equal to 75% of estimated nutrient needs with PO
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
## 2021-03-21 LAB
CULTURE: NORMAL
Lab: NORMAL
SPECIMEN DESCRIPTION: NORMAL

## 2021-03-21 PROCEDURE — 99233 SBSQ HOSP IP/OBS HIGH 50: CPT | Performed by: INTERNAL MEDICINE

## 2021-03-21 PROCEDURE — 2580000003 HC RX 258: Performed by: NURSE PRACTITIONER

## 2021-03-21 PROCEDURE — 6370000000 HC RX 637 (ALT 250 FOR IP): Performed by: INTERNAL MEDICINE

## 2021-03-21 PROCEDURE — 6360000002 HC RX W HCPCS: Performed by: NURSE PRACTITIONER

## 2021-03-21 PROCEDURE — 99232 SBSQ HOSP IP/OBS MODERATE 35: CPT | Performed by: INTERNAL MEDICINE

## 2021-03-21 PROCEDURE — 2060000000 HC ICU INTERMEDIATE R&B

## 2021-03-21 PROCEDURE — 6360000002 HC RX W HCPCS: Performed by: STUDENT IN AN ORGANIZED HEALTH CARE EDUCATION/TRAINING PROGRAM

## 2021-03-21 PROCEDURE — 6370000000 HC RX 637 (ALT 250 FOR IP): Performed by: NURSE PRACTITIONER

## 2021-03-21 PROCEDURE — 6370000000 HC RX 637 (ALT 250 FOR IP): Performed by: STUDENT IN AN ORGANIZED HEALTH CARE EDUCATION/TRAINING PROGRAM

## 2021-03-21 RX ADMIN — GABAPENTIN 300 MG: 300 CAPSULE ORAL at 13:58

## 2021-03-21 RX ADMIN — PIPERACILLIN AND TAZOBACTAM 3375 MG: 3; .375 INJECTION, POWDER, FOR SOLUTION INTRAVENOUS at 13:58

## 2021-03-21 RX ADMIN — ENOXAPARIN SODIUM 50 MG: 60 INJECTION SUBCUTANEOUS at 08:32

## 2021-03-21 RX ADMIN — DOCUSATE SODIUM 100 MG: 100 CAPSULE, LIQUID FILLED ORAL at 08:31

## 2021-03-21 RX ADMIN — ENOXAPARIN SODIUM 50 MG: 60 INJECTION SUBCUTANEOUS at 20:24

## 2021-03-21 RX ADMIN — GABAPENTIN 300 MG: 300 CAPSULE ORAL at 08:31

## 2021-03-21 RX ADMIN — GABAPENTIN 300 MG: 300 CAPSULE ORAL at 20:24

## 2021-03-21 RX ADMIN — PIPERACILLIN AND TAZOBACTAM 3375 MG: 3; .375 INJECTION, POWDER, FOR SOLUTION INTRAVENOUS at 05:56

## 2021-03-21 RX ADMIN — TIZANIDINE 4 MG: 4 TABLET ORAL at 01:23

## 2021-03-21 RX ADMIN — PIPERACILLIN AND TAZOBACTAM 3375 MG: 3; .375 INJECTION, POWDER, FOR SOLUTION INTRAVENOUS at 22:03

## 2021-03-21 NOTE — PROGRESS NOTES
Occupational Therapy Not Seen Note    DATE: 3/21/2021  Name: Aleja Choudhury  : 2000  MRN: [id number]    Patient not available for Occupational Therapy due to:    Patient is not appropriate for OOB activity at this time d/t recent dx of DVT on 3/20/21. Dopplers confirmed acute LLE DVT extending from the below knee gastrocnemius and posterior tibial veins into the external iliac vein. Acute superficial venous thrombosis identified int he great and small saphenous veins. First dose of Heparin was started on 3/20/21 at 1530. Await 24 hours of Heparin before Pt is allowed to be seen by therapy.        Electronically signed by MARK Boucher OTR/L on 3/21/2021 at 8:37 AM

## 2021-03-21 NOTE — PLAN OF CARE
Problem: Pain:  Goal: Pain level will decrease  Description: Pain level will decrease  Outcome: Ongoing  Goal: Control of acute pain  Description: Control of acute pain  Outcome: Ongoing  Goal: Control of chronic pain  Description: Control of chronic pain  Outcome: Ongoing     Problem: Falls - Risk of:  Goal: Will remain free from falls  Description: Will remain free from falls  Outcome: Ongoing  Goal: Absence of physical injury  Description: Absence of physical injury  Outcome: Ongoing     Problem: Skin Integrity:  Goal: Will show no infection signs and symptoms  Description: Will show no infection signs and symptoms  Outcome: Ongoing  Goal: Absence of new skin breakdown  Description: Absence of new skin breakdown  Outcome: Ongoing     Problem: Musculor/Skeletal Functional Status  Goal: Highest potential functional level  Outcome: Ongoing  Goal: Absence of falls  Outcome: Ongoing     Problem: Nutrition  Goal: Optimal nutrition therapy  Description: Nutrition Problem #1: Increased nutrient needs  Intervention: Food and/or Nutrient Delivery: Continue Current Diet, Start Oral Nutrition Supplement  Nutritional Goals: Meet greater than or equal to 75% of estimated nutrient needs with PO     Outcome: Ongoing   Electronically signed by Renetta Vargas RN on 3/21/2021 at 2:38 AM
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
## 2021-03-21 NOTE — PROGRESS NOTES
Capri Bethea, Shimon kahn, [address], [place], [address] [facility] [address], & [facility] [place]  Urology Progress Note    Subjective: Hu Marrero is a 23 y.o. male. No acute events overnight.    No chest pain, shortness of breath, nausea, vomiting, fevers, chills  Patient agitated stating we are not sure where his infection is  Good urine output from SP tube      Patient Vitals for the past 24 hrs:   BP Temp Temp src Pulse Resp SpO2 Height   03/21/21 0752 -- -- -- 92 19 100 % --   03/21/21 0751 -- -- -- 95 18 100 % --   03/21/21 0750 116/64 99 °F (37.2 °C) Oral 95 20 100 % --   03/21/21 0555 (!) 112/58 -- -- 89 26 -- --   03/21/21 0400 (!) 98/54 -- -- 87 (!) 37 -- --   03/21/21 0333 (!) 97/52 98.8 °F (37.1 °C) Oral 90 (!) 38 98 % --   03/21/21 0115 111/67 -- -- 116 25 -- --   03/21/21 0000 (!) 103/57 -- -- 124 17 -- --   03/20/21 2355 (!) 100/54 -- -- 110 -- -- --   03/20/21 2345 (!) 99/56 101.3 °F (38.5 °C) Oral 104 24 98 % --   03/20/21 2245 (!) 105/50 -- -- 103 -- -- --   03/20/21 2122 (!) 95/56 -- -- 101 -- -- --   03/20/21 2120 (!) 96/50 99 °F (37.2 °C) Oral 109 17 100 % --   03/20/21 2000 -- -- -- 94 -- -- --   03/20/21 1705 94/79 100.4 °F (38 °C) Axillary 95 23 100 % --   03/20/21 1507 -- -- -- -- -- -- 5' 5\" (1.651 m)   03/20/21 1244 (!) 99/49 99 °F (37.2 °C) Oral 124 18 98 % --       Intake/Output Summary (Last 24 hours) at 3/21/2021 0852  Last data filed at 3/21/2021 0548  Gross per 24 hour   Intake 5032 ml   Output 1810 ml   Net 3222 ml       Recent Labs     03/19/21  1824   WBC 24.6*   HGB 11.7*   HCT 36.2*   MCV 88.3        Recent Labs     03/19/21  1824 03/20/21  1138   * 129*   K 4.1 3.5*   CL 91* 93*   CO2 27 23   BUN 13 8   CREATININE 0.34* 0.26*       Recent Labs     03/19/21  1835   COLORU YELLOW   PHUR 7.0   WBCUA 20 TO 50   RBCUA 20 TO 50   MUCUS 1+*   TRICHOMONAS NOT REPORTED   YEAST NOT REPORTED   BACTERIA MANY*   SPECGRAV 1.021   LEUKOCYTESUR MODERATE*   UROBILINOGEN Normal   BILIRUBINUR NEGATIVE Physical Exam:     NAD, AOx3  RRR. Peripheral pulses palpable  Respirations nonlabored, symmetric chest rise bilaterally  Abdomen: soft, appropriately tender, nondistended  Lower extremities: No edema of calf tenderness bilaterally  SP tube in place draining yellow urine    Interval Imaging Findings:    Imaging was independently reviewed and checked with radiologist report      Impression:      Juan Maldonado is a 23 y.o. male admitted with      Problem List  - Neurogenic bladder from C7 spinal cord injury  - SP tube for urinary retention  - Urethral stricture    Plan:     - Patient will have SP tube changed in 2 weeks at [address] stop ditropan given no bladder spasms and severe constipation  - Zosyn.  Will defer to ID for antibiotics given complex medical history including decubitus ulcers  - UCx pending (3/20/21)  - BCx NG x 2 days      Tommy Mott  Urology Resident, PGY5  8:52 AM 3/21/2021

## 2021-03-21 NOTE — PLAN OF CARE
Problem: Pain:  Goal: Pain level will decrease  Description: Pain level will decrease  3/21/2021 1022 by Francois Shepard RN  Outcome: Ongoing  3/21/2021 0238 by Orly Quiñones RN  Outcome: Ongoing  Goal: Control of acute pain  Description: Control of acute pain  3/21/2021 1022 by Francois Shepard RN  Outcome: Ongoing  3/21/2021 0238 by Orly Quiñones RN  Outcome: Ongoing  Goal: Control of chronic pain  Description: Control of chronic pain  3/21/2021 1022 by Francois Shepard RN  Outcome: Ongoing  3/21/2021 0238 by Orly Quiñones RN  Outcome: Ongoing     Problem: Falls - Risk of:  Goal: Will remain free from falls  Description: Will remain free from falls  3/21/2021 1022 by Francois Shepard RN  Outcome: Ongoing  3/21/2021 0238 by Orly Quiñones RN  Outcome: Ongoing  Goal: Absence of physical injury  Description: Absence of physical injury  3/21/2021 1022 by Francois Shepard RN  Outcome: Ongoing  3/21/2021 0238 by Orly Quiñones RN  Outcome: Ongoing     Problem: Skin Integrity:  Goal: Will show no infection signs and symptoms  Description: Will show no infection signs and symptoms  3/21/2021 1022 by Francois Shepard RN  Outcome: Ongoing  3/21/2021 0238 by Orly Quiñones RN  Outcome: Ongoing  Goal: Absence of new skin breakdown  Description: Absence of new skin breakdown  3/21/2021 1022 by Francois Shepard RN  Outcome: Ongoing  3/21/2021 0238 by Orly Quiñones RN  Outcome: Ongoing     Problem: Musculor/Skeletal Functional Status  Goal: Highest potential functional level  3/21/2021 1022 by Francois Shepard RN  Outcome: Ongoing  3/21/2021 0238 by Orly Quiñones RN  Outcome: Ongoing  Goal: Absence of falls  3/21/2021 1022 by Francois Shepard RN  Outcome: Ongoing  3/21/2021 0238 by Orly Quiñones RN  Outcome: Ongoing     Problem: Nutrition  Goal: Optimal nutrition therapy  Description: Nutrition Problem #1: Increased nutrient needs  Intervention: Food and/or Nutrient Delivery: Continue Current Diet, Start Oral Nutrition Supplement  Nutritional Goals: Meet greater than or equal to 75% of estimated nutrient needs with PO     3/21/2021 1022 by Glo Roldan RN  Outcome: Ongoing  3/21/2021 0238 by Marielle Yanes RN  Outcome: Ongoing

## 2021-03-22 PROBLEM — E43 SEVERE MALNUTRITION (HCC): Status: ACTIVE | Noted: 2021-03-22

## 2021-03-22 LAB
ABSOLUTE EOS #: 0 K/UL (ref 0–0.4)
ABSOLUTE IMMATURE GRANULOCYTE: 0.17 K/UL (ref 0–0.3)
ABSOLUTE LYMPH #: 1.03 K/UL (ref 1–4.8)
ABSOLUTE MONO #: 1.38 K/UL (ref 0.1–1.4)
ANION GAP SERPL CALCULATED.3IONS-SCNC: 12 MMOL/L (ref 9–17)
BASOPHILS # BLD: 0 % (ref 0–2)
BASOPHILS ABSOLUTE: 0 K/UL (ref 0–0.2)
BUN BLDV-MCNC: 2 MG/DL (ref 6–20)
BUN/CREAT BLD: ABNORMAL (ref 9–20)
CALCIUM SERPL-MCNC: 8.4 MG/DL (ref 8.6–10.4)
CHLORIDE BLD-SCNC: 102 MMOL/L (ref 98–107)
CO2: 22 MMOL/L (ref 20–31)
CREAT SERPL-MCNC: <0.2 MG/DL (ref 0.7–1.2)
CULTURE: ABNORMAL
CULTURE: ABNORMAL
DIFFERENTIAL TYPE: ABNORMAL
EOSINOPHILS RELATIVE PERCENT: 0 % (ref 1–4)
GFR AFRICAN AMERICAN: ABNORMAL ML/MIN
GFR NON-AFRICAN AMERICAN: ABNORMAL ML/MIN
GFR SERPL CREATININE-BSD FRML MDRD: ABNORMAL ML/MIN/{1.73_M2}
GFR SERPL CREATININE-BSD FRML MDRD: ABNORMAL ML/MIN/{1.73_M2}
GLUCOSE BLD-MCNC: 84 MG/DL (ref 70–99)
HCT VFR BLD CALC: 28.9 % (ref 40.7–50.3)
HEMOGLOBIN: 8.9 G/DL (ref 13–17)
IMMATURE GRANULOCYTES: 1 %
INTERVENTION: NORMAL
LYMPHOCYTES # BLD: 6 % (ref 25–45)
Lab: ABNORMAL
MCH RBC QN AUTO: 27.8 PG (ref 25.2–33.5)
MCHC RBC AUTO-ENTMCNC: 30.8 G/DL (ref 28.4–34.8)
MCV RBC AUTO: 90.3 FL (ref 82.6–102.9)
MONOCYTES # BLD: 8 % (ref 2–8)
MORPHOLOGY: ABNORMAL
MORPHOLOGY: ABNORMAL
NRBC AUTOMATED: 0 PER 100 WBC
PDW BLD-RTO: 15.2 % (ref 11.8–14.4)
PLATELET # BLD: 312 K/UL (ref 138–453)
PLATELET ESTIMATE: ABNORMAL
PMV BLD AUTO: 9.9 FL (ref 8.1–13.5)
POTASSIUM SERPL-SCNC: 2.7 MMOL/L (ref 3.7–5.3)
RBC # BLD: 3.2 M/UL (ref 4.21–5.77)
RBC # BLD: ABNORMAL 10*6/UL
SEG NEUTROPHILS: 85 % (ref 34–64)
SEGMENTED NEUTROPHILS ABSOLUTE COUNT: 14.62 K/UL (ref 1.8–7.7)
SODIUM BLD-SCNC: 136 MMOL/L (ref 135–144)
SPECIMEN DESCRIPTION: ABNORMAL
WBC # BLD: 17.2 K/UL (ref 4.5–13.5)
WBC # BLD: ABNORMAL 10*3/UL

## 2021-03-22 PROCEDURE — 6370000000 HC RX 637 (ALT 250 FOR IP): Performed by: NURSE PRACTITIONER

## 2021-03-22 PROCEDURE — 6360000002 HC RX W HCPCS: Performed by: INTERNAL MEDICINE

## 2021-03-22 PROCEDURE — 85025 COMPLETE CBC W/AUTO DIFF WBC: CPT

## 2021-03-22 PROCEDURE — 6360000002 HC RX W HCPCS: Performed by: NURSE PRACTITIONER

## 2021-03-22 PROCEDURE — 80048 BASIC METABOLIC PNL TOTAL CA: CPT

## 2021-03-22 PROCEDURE — 99232 SBSQ HOSP IP/OBS MODERATE 35: CPT | Performed by: INTERNAL MEDICINE

## 2021-03-22 PROCEDURE — 2060000000 HC ICU INTERMEDIATE R&B

## 2021-03-22 PROCEDURE — 99213 OFFICE O/P EST LOW 20 MIN: CPT

## 2021-03-22 PROCEDURE — 2580000003 HC RX 258: Performed by: NURSE PRACTITIONER

## 2021-03-22 PROCEDURE — APPSS30 APP SPLIT SHARED TIME 16-30 MINUTES: Performed by: NURSE PRACTITIONER

## 2021-03-22 PROCEDURE — 6360000002 HC RX W HCPCS: Performed by: STUDENT IN AN ORGANIZED HEALTH CARE EDUCATION/TRAINING PROGRAM

## 2021-03-22 PROCEDURE — 36415 COLL VENOUS BLD VENIPUNCTURE: CPT

## 2021-03-22 RX ORDER — POTASSIUM CHLORIDE 7.45 MG/ML
10 INJECTION INTRAVENOUS PRN
Status: DISCONTINUED | OUTPATIENT
Start: 2021-03-22 | End: 2021-03-24 | Stop reason: HOSPADM

## 2021-03-22 RX ORDER — POTASSIUM CHLORIDE 20 MEQ/1
40 TABLET, EXTENDED RELEASE ORAL PRN
Status: DISCONTINUED | OUTPATIENT
Start: 2021-03-22 | End: 2021-03-24 | Stop reason: HOSPADM

## 2021-03-22 RX ADMIN — GABAPENTIN 300 MG: 300 CAPSULE ORAL at 14:33

## 2021-03-22 RX ADMIN — POTASSIUM CHLORIDE 10 MEQ: 10 INJECTION, SOLUTION INTRAVENOUS at 11:25

## 2021-03-22 RX ADMIN — POTASSIUM CHLORIDE 10 MEQ: 10 INJECTION, SOLUTION INTRAVENOUS at 15:32

## 2021-03-22 RX ADMIN — PIPERACILLIN AND TAZOBACTAM 3375 MG: 3; .375 INJECTION, POWDER, FOR SOLUTION INTRAVENOUS at 22:30

## 2021-03-22 RX ADMIN — GABAPENTIN 300 MG: 300 CAPSULE ORAL at 09:54

## 2021-03-22 RX ADMIN — ZONISAMIDE 100 MG: 100 CAPSULE ORAL at 09:52

## 2021-03-22 RX ADMIN — SODIUM CHLORIDE: 9 INJECTION, SOLUTION INTRAVENOUS at 08:43

## 2021-03-22 RX ADMIN — SODIUM CHLORIDE: 9 INJECTION, SOLUTION INTRAVENOUS at 09:56

## 2021-03-22 RX ADMIN — SODIUM CHLORIDE: 9 INJECTION, SOLUTION INTRAVENOUS at 17:18

## 2021-03-22 RX ADMIN — PIPERACILLIN AND TAZOBACTAM 3375 MG: 3; .375 INJECTION, POWDER, FOR SOLUTION INTRAVENOUS at 15:35

## 2021-03-22 RX ADMIN — POTASSIUM CHLORIDE 10 MEQ: 10 INJECTION, SOLUTION INTRAVENOUS at 16:30

## 2021-03-22 RX ADMIN — POTASSIUM CHLORIDE 10 MEQ: 10 INJECTION, SOLUTION INTRAVENOUS at 13:53

## 2021-03-22 RX ADMIN — GABAPENTIN 300 MG: 300 CAPSULE ORAL at 21:15

## 2021-03-22 RX ADMIN — PIPERACILLIN AND TAZOBACTAM 3375 MG: 3; .375 INJECTION, POWDER, FOR SOLUTION INTRAVENOUS at 05:57

## 2021-03-22 RX ADMIN — QUETIAPINE FUMARATE 25 MG: 25 TABLET ORAL at 21:15

## 2021-03-22 RX ADMIN — POTASSIUM CHLORIDE 10 MEQ: 10 INJECTION, SOLUTION INTRAVENOUS at 17:18

## 2021-03-22 RX ADMIN — SODIUM CHLORIDE: 9 INJECTION, SOLUTION INTRAVENOUS at 00:16

## 2021-03-22 RX ADMIN — POTASSIUM CHLORIDE 10 MEQ: 10 INJECTION, SOLUTION INTRAVENOUS at 12:30

## 2021-03-22 RX ADMIN — ENOXAPARIN SODIUM 50 MG: 60 INJECTION SUBCUTANEOUS at 21:15

## 2021-03-22 RX ADMIN — ENOXAPARIN SODIUM 50 MG: 60 INJECTION SUBCUTANEOUS at 09:55

## 2021-03-22 ASSESSMENT — PAIN SCALES - GENERAL
PAINLEVEL_OUTOF10: 10

## 2021-03-22 ASSESSMENT — PAIN DESCRIPTION - PAIN TYPE
TYPE: CHRONIC PAIN
TYPE: CHRONIC PAIN

## 2021-03-22 ASSESSMENT — PAIN DESCRIPTION - ORIENTATION
ORIENTATION: RIGHT

## 2021-03-22 ASSESSMENT — PAIN DESCRIPTION - LOCATION
LOCATION: ARM

## 2021-03-22 NOTE — PLAN OF CARE
Problem: Pain:  Goal: Pain level will decrease  Description: Pain level will decrease  Outcome: Ongoing  Goal: Control of acute pain  Description: Control of acute pain  Outcome: Ongoing  Goal: Control of chronic pain  Description: Control of chronic pain  Outcome: Ongoing     Problem: Falls - Risk of:  Goal: Will remain free from falls  Description: Will remain free from falls  Outcome: Ongoing  Goal: Absence of physical injury  Description: Absence of physical injury  Outcome: Ongoing     Problem: Skin Integrity:  Goal: Will show no infection signs and symptoms  Description: Will show no infection signs and symptoms  Outcome: Ongoing  Goal: Absence of new skin breakdown  Description: Absence of new skin breakdown  Outcome: Ongoing     Problem: Musculor/Skeletal Functional Status  Goal: Highest potential functional level  Outcome: Ongoing  Goal: Absence of falls  Outcome: Ongoing     Problem: Nutrition  Goal: Optimal nutrition therapy  Description: Nutrition Problem #1: Increased nutrient needs  Intervention: Food and/or Nutrient Delivery: Continue Current Diet, Start Oral Nutrition Supplement  Nutritional Goals: Meet greater than or equal to 75% of estimated nutrient needs with PO     Outcome: Ongoing   Electronically signed by Carol Rosa RN on 3/22/2021 at 3:04 AM

## 2021-03-22 NOTE — PROGRESS NOTES
cm   Wound Width (cm) 3.5 cm   Wound Depth (cm) 2 cm   Wound Surface Area (cm^2) 26.25 cm^2   Change in Wound Size % (l*w) -64.06   Wound Volume (cm^3) 52.5 cm^3   Wound Healing % -265   Wound Assessment Slough; Subcutaneous;Pink/red; Exposed structure muscle; Purple/maroon   Drainage Amount Small   Drainage Description Serosanguinous   Odor None   Leti-wound Assessment Fragile        Wound has significantly increased in size and depth since his appointment at the wound care center. There is also signs of deep tissue injury surrounding the already open wound. This deep tissue injury is expected to also devolve and open. Response to treatment:  Well tolerated by patient. Plan:     Plan of Care: Turn every 2 hours. Keep off of the wound. Float heels off of bed with pillows under calves or use patient's PRAFO boots    Use lift sling to reposition patient to minimize potential for shear injury. Routine incontinence care with incontinence barrier cloths and zinc oxide cream. Apply zinc oxide cream BID and prn incontinence. Moisture wicking under pads vs cloth backed briefs    Coccyx: irrigate wound with saline, apply saline to a fluff, apply Santyl collagenase ointment to the fluff and place into the wound. Cover with an ABD pad. Change daily. Specialty Bed Required : Yes   [] Low Air Loss   [x] Pressure Redistribution  [] Fluid Immersion  [] Bariatric  [] Total Pressure Relief  [] Other:     Discharge Plan:  tbd  Advise following up to the [address] Dressing supplies for initial home use provided to the patient.      Patient/Caregiver Teaching:    [] Indicates understanding       [x] Needs reinforcement  [] Unsuccessful      [] Verbal Understanding  [] Demonstrated understanding       [] No evidence of learning  [] Refused teaching         [] N/A       Electronically signed by Dean Chamberlain RN, CWON on 3/22/2021 at 11:57 AM
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
## 2021-03-22 NOTE — PROGRESS NOTES
Physical Therapy    DATE: 3/22/2021    NAME: Juan Maldonado  MRN: [id number]   : 2000      Patient not seen this date for Physical Therapy due to:    Patient Declined: Pt declined PT at this time.  Will check back 3/23      Electronically signed by Emy Trinidad PTA on 3/22/2021 at 2:36 PM
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
## 2021-03-22 NOTE — PROGRESS NOTES
Occupational Therapy    Occupational Therapy Not Seen Note    DATE: 3/22/2021  Name: Armando Head  : 2000  MRN: [id number]    Patient not available for Occupational Therapy due to:    Patient Declined: Pt declined OT eval this date despite encouragement to attempt therapy. Pt requested attempt tomorrow.     Next Scheduled Treatment: Attempt 3/23/2021 as appropriate    Electronically signed by Sergei Pierson OT/S on 3/22/2021 at 2:51 PM

## 2021-03-22 NOTE — PROGRESS NOTES
Comprehensive Nutrition Assessment    Type and Reason for Visit:  Reassess    Nutrition Recommendations/Plan:   -Continue general diet   -Recommend modifying to ensure clear supplements BID per pt request   -Will continue to monitor po intake, weights and wound healing     Nutrition Assessment:  Pt stated that his appetite is improving -observed <25% of his b-fast tray consumed. Pt stated that he dislikes the ensure enlive supplements but is willing to try the ensure clear supplements. Pt appeared very frail and meets the criteria for severe malnutrition. Pt w/ 28% wt loss x 3 mo, significant. Will modify ONS per pt request and to aide in wound healing progression. Will monitor.      Malnutrition Assessment:  Malnutrition Status:  Severe malnutrition    Context:  Chronic Illness     Findings of the 6 clinical characteristics of malnutrition:  Energy Intake:  7 - 75% or less estimated energy requirements for 1 month or longer  Weight Loss:  7 - Greater than 7.5% over 3 months     Body Fat Loss:  7 - Severe body fat loss Orbital, Triceps   Muscle Mass Loss:  7 - Severe muscle mass loss Temples (temporalis), Clavicles (pectoralis & deltoids), Scapula (trapezius)  Fluid Accumulation:  7 - Severe Extremities   Strength:  Not Performed    Estimated Daily Nutrient Needs:  Energy (kcal):  30-35 ~> 2548-5193 kcals/d; Weight Used for Energy Requirements:  Admission     Protein (g):  1.5-2.0 gm/kg ~>  gms/d; Weight Used for Protein Requirements:  Admission          Nutrition Related Findings:  BM 3/22; K 2.7      Wounds:  Pressure Injury, Stage III       Current Nutrition Therapies:    DIET GENERAL;  Dietary Nutrition Supplements: Standard High Calorie Oral Supplement    Anthropometric Measures:  · Height: 5' 5\" (165.1 cm)  · Current Body Weight: 113 lb (51.3 kg)   · Admission Body Weight: 113 lb (51.3 kg)    · Usual Body Weight: 158 lb (71.7 kg)(per EHR)     · Ideal Body Weight: 136 lbs; % Ideal Body Weight 83.1 %   · BMI: 18.8  · Adjusted Body Weight: 127.1; Quadraplegia   · Adjusted BMI: 21.2    · BMI Categories: Normal Weight (BMI 18.5-24. 9)       Nutrition Diagnosis:   · Severe malnutrition, In context of chronic illness related to inadequate protein-energy intake(medical conditions) as evidenced by severe loss of subcutaneous fat, localized or generalized fluid accumulation, severe muscle loss, poor intake prior to admission, intake 0-25%, weight loss 7.5% in 3 months      Nutrition Interventions:   Food and/or Nutrient Delivery:  Continue Current Diet, Modify Oral Nutrition Supplement  Nutrition Education/Counseling:  Education not indicated   Coordination of Nutrition Care:  Continue to monitor while inpatient    Goals: Progressing   Meet greater than or equal to 75% of estimated nutrient needs with PO       Nutrition Monitoring and Evaluation:   Food/Nutrient Intake Outcomes:  Food and Nutrient Intake, Supplement Intake  Physical Signs/Symptoms Outcomes:  Biochemical Data, Nutrition Focused Physical Findings, Weight, GI Status, Fluid Status or Edema, Skin     Discharge Planning:     Too soon to determine     Electronically signed by Elvi Cintron RD, LD on 3/22/21 at 1:26 PM EDT    Contact: [phone]
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
## 2021-03-22 NOTE — PROGRESS NOTES
Physician Progress Note      PATIENTErminio Scriver GARRISON  CSN #:                  [id number]  :                       2000  ADMIT DATE:       3/19/2021 5:53 PM  [facility] DATE:  RESPONDING  PROVIDER #:        Javier Krause MD          QUERY TEXT:    Patient admitted with Sepsis . noted BMI of 18.8 and dietician consult   documents meets criteria for severe malnutrition . If possible, please   document in progress notes and discharge summary if you are evaluating and /or   treating any of the following: The medical record reflects the following:  Risk Factors: quadriplegia, chronic pressure ulcer , sepsis, UTI  Clinical Indicators: dietician consult documents meets criteria for severe   malnutrition with severe body fat and muscle mass loss in setting of chronic   pressure ulcer and sepsis  Treatment: nutritional supplements recommended    Thank you Cecille Whitney RN CCDS BSN. . call if questions [phone]  Options provided:  -- Protein calorie malnutrition severe  -- severe malnutrition  -- Other - I will add my own diagnosis  -- Disagree - Not applicable / Not valid  -- Disagree - Clinically unable to determine / Unknown  -- Refer to Clinical Documentation Reviewer    PROVIDER RESPONSE TEXT:    this patient has severe malnutrition    Query created by:  Jael Ruth on 3/22/2021 2:02 PM      Electronically signed by:  Javier Krause MD 3/22/2021 2:08 PM

## 2021-03-23 LAB
ABSOLUTE EOS #: 0 K/UL (ref 0–0.4)
ABSOLUTE IMMATURE GRANULOCYTE: 0.3 K/UL (ref 0–0.3)
ABSOLUTE LYMPH #: 2.55 K/UL (ref 1–4.8)
ABSOLUTE MONO #: 1.65 K/UL (ref 0.1–1.4)
ANION GAP SERPL CALCULATED.3IONS-SCNC: 8 MMOL/L (ref 9–17)
BASOPHILS # BLD: 0 % (ref 0–2)
BASOPHILS ABSOLUTE: 0 K/UL (ref 0–0.2)
BUN BLDV-MCNC: <2 MG/DL (ref 6–20)
BUN/CREAT BLD: ABNORMAL (ref 9–20)
CALCIUM SERPL-MCNC: 8 MG/DL (ref 8.6–10.4)
CHLORIDE BLD-SCNC: 104 MMOL/L (ref 98–107)
CO2: 27 MMOL/L (ref 20–31)
CREAT SERPL-MCNC: <0.2 MG/DL (ref 0.7–1.2)
DIFFERENTIAL TYPE: ABNORMAL
EOSINOPHILS RELATIVE PERCENT: 0 % (ref 1–4)
GFR AFRICAN AMERICAN: ABNORMAL ML/MIN
GFR NON-AFRICAN AMERICAN: ABNORMAL ML/MIN
GFR SERPL CREATININE-BSD FRML MDRD: ABNORMAL ML/MIN/{1.73_M2}
GFR SERPL CREATININE-BSD FRML MDRD: ABNORMAL ML/MIN/{1.73_M2}
GLUCOSE BLD-MCNC: 127 MG/DL (ref 70–99)
HCT VFR BLD CALC: 29.2 % (ref 40.7–50.3)
HEMOGLOBIN: 9 G/DL (ref 13–17)
IMMATURE GRANULOCYTES: 2 %
LYMPHOCYTES # BLD: 17 % (ref 25–45)
MCH RBC QN AUTO: 28.2 PG (ref 25.2–33.5)
MCHC RBC AUTO-ENTMCNC: 30.8 G/DL (ref 28.4–34.8)
MCV RBC AUTO: 91.5 FL (ref 82.6–102.9)
MONOCYTES # BLD: 11 % (ref 2–8)
MORPHOLOGY: ABNORMAL
NRBC AUTOMATED: 0 PER 100 WBC
PDW BLD-RTO: 15.4 % (ref 11.8–14.4)
PLATELET # BLD: 372 K/UL (ref 138–453)
PLATELET ESTIMATE: ABNORMAL
PMV BLD AUTO: 9.8 FL (ref 8.1–13.5)
POTASSIUM SERPL-SCNC: 3 MMOL/L (ref 3.7–5.3)
RBC # BLD: 3.19 M/UL (ref 4.21–5.77)
RBC # BLD: ABNORMAL 10*6/UL
SEG NEUTROPHILS: 70 % (ref 34–64)
SEGMENTED NEUTROPHILS ABSOLUTE COUNT: 10.5 K/UL (ref 1.8–7.7)
SODIUM BLD-SCNC: 139 MMOL/L (ref 135–144)
WBC # BLD: 15 K/UL (ref 4.5–13.5)
WBC # BLD: ABNORMAL 10*3/UL

## 2021-03-23 PROCEDURE — 6370000000 HC RX 637 (ALT 250 FOR IP): Performed by: NURSE PRACTITIONER

## 2021-03-23 PROCEDURE — 6370000000 HC RX 637 (ALT 250 FOR IP): Performed by: INTERNAL MEDICINE

## 2021-03-23 PROCEDURE — 85025 COMPLETE CBC W/AUTO DIFF WBC: CPT

## 2021-03-23 PROCEDURE — 6360000002 HC RX W HCPCS: Performed by: STUDENT IN AN ORGANIZED HEALTH CARE EDUCATION/TRAINING PROGRAM

## 2021-03-23 PROCEDURE — 36415 COLL VENOUS BLD VENIPUNCTURE: CPT

## 2021-03-23 PROCEDURE — 97110 THERAPEUTIC EXERCISES: CPT

## 2021-03-23 PROCEDURE — 2060000000 HC ICU INTERMEDIATE R&B

## 2021-03-23 PROCEDURE — 80048 BASIC METABOLIC PNL TOTAL CA: CPT

## 2021-03-23 PROCEDURE — 99232 SBSQ HOSP IP/OBS MODERATE 35: CPT | Performed by: INTERNAL MEDICINE

## 2021-03-23 PROCEDURE — 2580000003 HC RX 258: Performed by: NURSE PRACTITIONER

## 2021-03-23 PROCEDURE — 6360000002 HC RX W HCPCS: Performed by: INTERNAL MEDICINE

## 2021-03-23 PROCEDURE — APPSS30 APP SPLIT SHARED TIME 16-30 MINUTES: Performed by: NURSE PRACTITIONER

## 2021-03-23 PROCEDURE — 6360000002 HC RX W HCPCS: Performed by: NURSE PRACTITIONER

## 2021-03-23 PROCEDURE — 99232 SBSQ HOSP IP/OBS MODERATE 35: CPT | Performed by: FAMILY MEDICINE

## 2021-03-23 RX ORDER — LEVOFLOXACIN 500 MG/1
500 TABLET, FILM COATED ORAL DAILY
Qty: 7 TABLET | Refills: 0 | Status: SHIPPED | OUTPATIENT
Start: 2021-03-24 | End: 2021-03-31

## 2021-03-23 RX ORDER — AMOXICILLIN 500 MG/1
500 CAPSULE ORAL EVERY 8 HOURS SCHEDULED
Status: DISCONTINUED | OUTPATIENT
Start: 2021-03-23 | End: 2021-03-24 | Stop reason: HOSPADM

## 2021-03-23 RX ORDER — AMOXICILLIN 500 MG/1
500 CAPSULE ORAL EVERY 8 HOURS SCHEDULED
Qty: 21 CAPSULE | Refills: 0 | Status: SHIPPED | OUTPATIENT
Start: 2021-03-23 | End: 2021-03-30

## 2021-03-23 RX ORDER — LEVOFLOXACIN 500 MG/1
500 TABLET, FILM COATED ORAL DAILY
Status: DISCONTINUED | OUTPATIENT
Start: 2021-03-23 | End: 2021-03-24 | Stop reason: HOSPADM

## 2021-03-23 RX ADMIN — LEVOFLOXACIN 500 MG: 500 TABLET, FILM COATED ORAL at 13:05

## 2021-03-23 RX ADMIN — AMOXICILLIN 500 MG: 500 CAPSULE ORAL at 14:10

## 2021-03-23 RX ADMIN — SODIUM CHLORIDE: 9 INJECTION, SOLUTION INTRAVENOUS at 05:41

## 2021-03-23 RX ADMIN — POTASSIUM CHLORIDE 10 MEQ: 10 INJECTION, SOLUTION INTRAVENOUS at 16:09

## 2021-03-23 RX ADMIN — POTASSIUM CHLORIDE 10 MEQ: 10 INJECTION, SOLUTION INTRAVENOUS at 11:50

## 2021-03-23 RX ADMIN — POTASSIUM CHLORIDE 10 MEQ: 10 INJECTION, SOLUTION INTRAVENOUS at 17:38

## 2021-03-23 RX ADMIN — POTASSIUM CHLORIDE 10 MEQ: 10 INJECTION, SOLUTION INTRAVENOUS at 14:09

## 2021-03-23 RX ADMIN — GABAPENTIN 300 MG: 300 CAPSULE ORAL at 14:10

## 2021-03-23 RX ADMIN — PIPERACILLIN AND TAZOBACTAM 3375 MG: 3; .375 INJECTION, POWDER, FOR SOLUTION INTRAVENOUS at 08:31

## 2021-03-23 RX ADMIN — GABAPENTIN 300 MG: 300 CAPSULE ORAL at 22:09

## 2021-03-23 RX ADMIN — AMOXICILLIN 500 MG: 500 CAPSULE ORAL at 22:08

## 2021-03-23 RX ADMIN — COLLAGENASE SANTYL: 250 OINTMENT TOPICAL at 15:55

## 2021-03-23 RX ADMIN — APIXABAN 10 MG: 5 TABLET, FILM COATED ORAL at 22:08

## 2021-03-23 RX ADMIN — POTASSIUM CHLORIDE 10 MEQ: 10 INJECTION, SOLUTION INTRAVENOUS at 10:22

## 2021-03-23 RX ADMIN — POTASSIUM CHLORIDE 10 MEQ: 10 INJECTION, SOLUTION INTRAVENOUS at 13:03

## 2021-03-23 RX ADMIN — ENOXAPARIN SODIUM 50 MG: 60 INJECTION SUBCUTANEOUS at 09:43

## 2021-03-23 RX ADMIN — QUETIAPINE FUMARATE 25 MG: 25 TABLET ORAL at 22:09

## 2021-03-23 ASSESSMENT — PAIN DESCRIPTION - ORIENTATION
ORIENTATION: RIGHT

## 2021-03-23 ASSESSMENT — PAIN DESCRIPTION - LOCATION
LOCATION: ARM

## 2021-03-23 ASSESSMENT — PAIN DESCRIPTION - PAIN TYPE
TYPE: CHRONIC PAIN

## 2021-03-23 ASSESSMENT — PAIN SCALES - GENERAL
PAINLEVEL_OUTOF10: 10

## 2021-03-23 NOTE — PROGRESS NOTES
neurogenic bladder as a result of the previous injuries. The patient has required an indwelling suprapubic catheter. Patient was brought in through the emergency room on 3/19/2021 because of the presence of fevers, malaise, fatigue. ID service consulted because of concern for urinary tract infection and potential sepsis  Urine examination shows inflammation. The urine culture is pending. Blood cultures show no growth. Patient also found to be hyponatremic and hypokalemic and showing some elevation in his lactic acid. CURRENT EVALUATION :3/23/2021    Afebrile  VS stable    The patient seen and evaluated at bedside. He is alert and oriented on room air. Patient does not have any fevers, chills, cough, shortness of breath, chest pains or palpitations. Unfortunately, he is refusing PT/OT as well as many other patient care interventions. Doppler on 3/20/21 revealed DVT of left common iliac, external iliac and femoral veins for which Lovenox was started. Urine culture from 3/20/21 was positive for ENTEROBACTER CLOACAE >[id number] & ENTEROCOCCUS FAECALIS >[id number]     The patient is currently receiving Zosyn. Will plan to:  · Amoxicillin 500 mg po TID. Stop date 3-30-21  · Levaquin 500 mg po q day. Stop date 3-30-21  · D/C zosyn    Patient is experiencing fecal impaction with dilated bowels but he is having bowel movements per EHR    Wound care assessed patient on 3/222/21 and noted that his coccyx wound has worsened to a stage IV with suspicion for deep tissue injury.     Updated coccyx wound photo from 3/22/21        Coccyx wound photo from 2/23/21      Labs, X rays reviewed: 3/23/2021      BUN: 8--><2  Cr: 0.26--><0.20  Sodium 129-->136-->139  Potassium 3.5    WBC: 24.6-->17.2-->15.0  Hb: 11.7-->8.9-->9.0  Plat: 245-->312-->372    Cultures:  Urine:  ENTEROBACTER CLOACAE >[id number] CFU/ML    Culture Abnormal  03/20/2021 11:12  [address]   ENTEROCOCCUS FAECALIS >[id number] CFU/ML 60 mg Oral Daily    famotidine  20 mg Oral Daily    fluticasone  1 spray Each Nostril Daily    gabapentin  300 mg Oral TID    QUEtiapine  25 mg Oral BID    zonisamide  100 mg Oral Daily    piperacillin-tazobactam  3,375 mg Intravenous Q8H       Social History:     Social History     Socioeconomic History    Marital status: Single     Spouse name: Not on file    Number of children: Not on file    Years of education: Not on file    Highest education level: Not on file   Occupational History    Not on file   Social Needs    Financial resource strain: Not on file    Food insecurity     Worry: Not on file     Inability: Not on file    Transportation needs     Medical: Not on file     Non-medical: Not on file   Tobacco Use    Smoking status: Never Smoker    Smokeless tobacco: Never Used   Substance and Sexual Activity    Alcohol use: No    Drug use: Not Currently     Frequency: 1.0 times per week     Types: Marijuana     Comment: last time was late november 2020    Sexual activity: Not on file   Lifestyle    Physical activity     Days per week: Not on file     Minutes per session: Not on file    Stress: Not on file   Relationships    Social connections     Talks on phone: Not on file     Gets together: Not on file     Attends [employer] service: Not on file     Active member of club or organization: Not on file     Attends meetings of clubs or organizations: Not on file     Relationship status: Not on file    Intimate partner violence     Fear of current or ex partner: Not on file     Emotionally abused: Not on file     Physically abused: Not on file     Forced sexual activity: Not on file   Other Topics Concern    Not on file   Social History Narrative    ** Merged History Encounter **         Lives with dad, sister, and grandma. In 9th grade.        Family History:     Family History   Problem Relation Age of Onset    Asthma Paternal Uncle     High Blood Pressure Paternal Grandmother Allergies:   Patient has no known allergies. Review of Systems:   Constitutional: Fevers, no chills. Malaise, fatigue  Head: No headaches  Eyes: Traumatic blindness right eye  ENT: No sore throat or runny nose. . No hearing loss, tinnitus or vertigo. Cardiovascular: No chest pain or palpitations. No shortness of breath. No CARDENAS  Lung: No shortness of breath or cough. No sputum production  Abdomen: No nausea, vomiting, diarrhea, or abdominal pain. Cheli Madrigal No cramps. Genitourinary: Neurogenic bladder with suprapubic tube  Musculoskeletal: No muscle aches or pains. No joint effusions, swelling or deformities  Hematologic: No bleeding or bruising. Neurologic: Quadriplegia  Integument: No rash, no ulcers. Psychiatric: No depression. Endocrine: No polyuria, no polydipsia, no polyphagia. Physical Examination :     Patient Vitals for the past 8 hrs:   BP Temp Temp src Pulse Resp SpO2   03/23/21 0902 -- -- -- 88 15 100 %   03/23/21 0800 (!) 115/57 97.7 °F (36.5 °C) Oral 85 15 100 %   03/23/21 0700 (!) 116/57 -- -- 86 16 --   03/23/21 0500 (!) 97/47 -- -- 77 16 100 %     General Appearance: Awake, alert, and in no apparent distress  Head:  Normocephalic, no trauma  Eyes: Lt Pupils round, reactive to light; extraocular movements intact; sclera anicteric; conjunctivae pink. No embolic phenomena. Has blindness of the right eye  ENT: Oropharynx clear, without erythema, exudate, or thrush. No tenderness of sinuses. Mouth/throat: mucosa pink and moist. No lesions. Dentition in good repair. Neck:Supple, without lymphadenopathy. Thyroid normal, No bruits. Pulmonary/Chest: Clear to auscultation, without wheezes, rales, or rhonchi. No dullness to percussion. Cardiovascular: Regular rate and rhythm without murmurs, rubs, or gallops. Abdomen: Soft, non tender. Bowel sounds normal. No organomegaly. Prepubic catheter in place  All four Extremities: No cyanosis, clubbing, edema, or effusions.   Neurologic: Quadriplegia, muscle atrophy. Limited motion of the arms  Skin: Warm and dry with good turgor. No signs of peripheral arterial or venous insufficiency. Coccygeal ulceration    Medical Decision Making -Laboratory:   I have independently reviewed/ordered the following labs:    CBC with Differential:   Recent Labs     03/22/21  0907 03/23/21  0345   WBC 17.2* 15.0*   HGB 8.9* 9.0*   HCT 28.9* 29.2*    372   LYMPHOPCT 6* 17*   MONOPCT 8 11*     BMP:   Recent Labs     03/22/21  0907 03/23/21  0345    139   K 2.7* 3.0*    104   CO2 22 27   BUN 2* <2*   CREATININE <0.20* <0.20*     Hepatic Function Panel:   No results for input(s): PROT, LABALBU, BILIDIR, IBILI, BILITOT, ALKPHOS, ALT, AST in the last 72 hours. No results for input(s): RPR in the last 72 hours. No results for input(s): HIV in the last 72 hours. No results for input(s): BC in the last 72 hours. Lab Results   Component Value Date    MUCUS 1+ 03/19/2021    RBC 3.19 03/23/2021    TRICHOMONAS NOT REPORTED 03/19/2021    WBC 15.0 03/23/2021    YEAST NOT REPORTED 03/19/2021    TURBIDITY TURBID 03/19/2021     Lab Results   Component Value Date    CREATININE <0.20 03/23/2021    GLUCOSE 127 03/23/2021       Medical Decision Making-Imaging:     EXAMINATION:   ONE XRAY VIEW OF THE CHEST       3/19/2021 6:26 pm       COMPARISON:   None.       HISTORY:   ORDERING SYSTEM PROVIDED HISTORY: sepsis   TECHNOLOGIST PROVIDED HISTORY:   sepsis   Reason for Exam: sepsis   Acuity: Acute   Type of Exam: Initial       FINDINGS:   Cardiac silhouette within normal limits.  Costophrenic angles sharp.  There   is evidence for infiltrates or effusions.  Nonspecific bowel gas pattern.           Impression   No acute abnormality in the lungs. Medical Decision Qxwsai-Cwtjlfvk-Raqhz:     Specimen Description 03/20/2021 11:12  [address]   . CLEAN CATCH URINE    Special Requests 03/20/2021 11:12  [address]   NOT REPORTED    Culture Abnormal 03/20/2021 11:12  [address]   ENTEROBACTER CLOACAE >[id number] CFU/ML    Culture Abnormal  03/20/2021 11:12  [address]   ENTEROCOCCUS FAECALIS >[id number] CFU/ML    Testing Performed By    Lab - [address] Name Director Address Valid Date Range   [address] [address] Linda MD [address] [facility] [address] 08/30/17 0801-Present   Susceptibility    Enterobacter cloacae (1)    Antibiotic Interpretation JEREMY Status    amikacin   Final     NOT REPORTED    aztreonam Resistant  Final     >=64   RESISTANT   ceFAZolin   Final     NOT REPORTED    cefepime Sensitive  Final     <=1   SUSCEPTIBLE   cefTRIAXone Resistant  Final     >=64   RESISTANT   ciprofloxacin Sensitive  Final     <=0.25   SUSCEPTIBLE   ertapenem   Final     NOT REPORTED    gentamicin Sensitive  Final     <=1   SUSCEPTIBLE   meropenem   Final     NOT REPORTED    nitrofurantoin Sensitive  Final     32   SUSCEPTIBLE   tigecycline   Final     NOT REPORTED    tobramycin Sensitive  Final     <=1   SUSCEPTIBLE   trimethoprim-sulfamethoxazole Sensitive  Final     <=20   SUSCEPTIBLE   piperacillin-tazobactam Resistant  Final     >=128   RESISTANT   Enterococcus  faecalis (2)    Antibiotic Interpretation JEREMY Status    ampicillin Sensitive  Final     <=2   SUSCEPTIBLE   penicillin   Final     NOT REPORTED    ciprofloxacin Sensitive  Final     <=0.5   SUSCEPTIBLE   erythromycin   Final     NOT REPORTED    Gentamicin, High Level  NOT REPORTED Final    levofloxacin Sensitive  Final     1   SUSCEPTIBLE   linezolid   Final     NOT REPORTED    nitrofurantoin Sensitive  Final     <=16   SUSCEPTIBLE   Synercid   Final     NOT REPORTED    Streptomycin, Hi Level  NOT REPORTED Final    tetracycline Sensitive  Final     <=1   SUSCEPTIBLE   tigecycline   Final     NOT REPORTED    vancomycin Sensitive  Final     1   SUSCEPTIBLE         Medical Decision Making-Other:     Note:  · Labs, medications, radiologic studies were reviewed with personal review of films  · Large amounts of data were reviewed  · Discussed with nursing Staff, Discharge planner  · Infection Control and Prevention measures reviewed  · All prior entries were reviewed  · Administer medications as ordered  · Prognosis: Fair  · Discharge planning reviewed  · Follow up as outpatient. Thank you for allowing us to participate in the care of this patient. Please call with questions. Merlyn Boggs, APRN - CNP     ATTESTATION:    I have discussed the case, including pertinent history and exam findings with the APRN. I have evaluated the  History, physical findings and pictures of the patient and the key elements of the encounter have been performed by me. I have reviewed the laboratory data, other diagnostic studies and discussed them with the APRN. I have updated the medical record where necessary. I agree with the assessment, plan and orders as documented by the APRN.     Brett Sawyer MD.        Pager: [phone] - Office: [phone]

## 2021-03-23 NOTE — PROGRESS NOTES
Occupational Therapy    Occupational Therapy Not Seen Note    DATE: 3/23/2021  Name: Prasad Camejo  : 2000  MRN: [id number]    Patient not available for Occupational Therapy due to: Other: PT just finished with pt, pt not agreeable for OOB activity or BUE ROM activities according to PTA. Hold OT eval.    Next Scheduled Treatment: Attempt on 3/24 as appropriate.     Electronically signed by Sloan Velasquez OT on 3/23/2021 at 4:08 PM

## 2021-03-23 NOTE — CARE COORDINATION
Still waiting on follow-up regarding patient's primary care provider for home care order. Otherwise stable for discharge.      VIKKI Nicolas
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
## 2021-03-23 NOTE — FLOWSHEET NOTE
Nurse writing:     Patient is becoming more hostile and abusive towards nursing staff. When the RN came in to change his IV bag and ask him if he wanted to take his newly prescribed oral antibiotic he was refusing to give a yes or no answer. When asked again the patient began yelling and cursing a the nurse by saying the following \" stop talking you bitch ass matty\". The patient was on Face Time with his girl friend and pointed the phone a the nurse and said \"look at this bitch ass fag\". Then while point the phone and video taping continued to threaten his nurse by stating \" when I see your Bitch ass on the street I'm going to kick your bitch ass\" . In addition while his nurse was hanging Potassium IV bag the patient continued to video tape on face time indicating his intent to reveal the nurses identity to a third party while saying \"your bitch ass is going to be RIP, I promise you that, you fucking bitch ass fag\". Nurse writer left the room after Potassium IV bag was hanged and contacted supervisor who advised security to be called.

## 2021-03-23 NOTE — PROGRESS NOTES
from SNF.   -CT A/P: which showed no hydronephosis, but concerning for DVT of left common iliac, external iliac and femoral veins and fecal impaction with small and large bowel distension. Review of Systems:     Constitutional:  negative for chills, fevers, +sweats  Respiratory:  negative for cough, dyspnea on exertion, shortness of breath, wheezing  Cardiovascular:  negative for chest pain, chest pressure/discomfort, lower extremity edema, palpitations  Gastrointestinal:  negative for abdominal pain, constipation, diarrhea, nausea, vomiting  Neurological:  negative for dizziness, headache, paraplegia present    Medications: Allergies:  No Known Allergies    Current Meds:   Scheduled Meds:    collagenase   Topical Daily    docusate sodium  100 mg Oral Daily    polyethylene glycol  17 g Oral Daily    milk and molasses  240 mL Rectal Once    heparin (porcine)  80 Units/kg Intravenous Once    enoxaparin  1 mg/kg Subcutaneous BID    DULoxetine  60 mg Oral Daily    famotidine  20 mg Oral Daily    fluticasone  1 spray Each Nostril Daily    gabapentin  300 mg Oral TID    QUEtiapine  25 mg Oral BID    zonisamide  100 mg Oral Daily    piperacillin-tazobactam  3,375 mg Intravenous Q8H     Continuous Infusions:    sodium chloride 125 mL/hr at 03/23/21 0541     PRN Meds: potassium chloride **OR** potassium alternative oral replacement **OR** potassium chloride, tiZANidine, albuterol, guaiFENesin, polyvinyl alcohol, sodium chloride, nicotine, promethazine **OR** ondansetron, perflutren lipid microspheres, acetaminophen **OR** acetaminophen    Data:     Past Medical History:   has no past medical history on file. Social History:   reports that he has never smoked. He has never used smokeless tobacco. He reports previous drug use. Frequency: 1.00 time per week. Drug: Marijuana. He reports that he does not drink alcohol.      Family History:   Family History   Problem Relation Age of Onset    Asthma Paternal Uncle     High Blood Pressure Paternal Grandmother        Vitals:  BP (!) 115/57   Pulse 88   Temp 97.7 °F (36.5 °C) (Oral)   Resp 15   Ht 5' 5\" (1.651 m)   Wt 113 lb (51.3 kg)   SpO2 100%   BMI 18.80 kg/m²   Temp (24hrs), Av.2 °F (36.8 °C), Min:97.7 °F (36.5 °C), Max:98.6 °F (37 °C)    No results for input(s): POCGLU in the last 72 hours. I/O (24Hr): Intake/Output Summary (Last 24 hours) at 3/23/2021 1007  Last data filed at 3/23/2021 0500  Gross per 24 hour   Intake 3709 ml   Output 2300 ml   Net 1409 ml       Labs:  Hematology:  Recent Labs     21  0907 21  0345   WBC 17.2* 15.0*   RBC 3.20* 3.19*   HGB 8.9* 9.0*   HCT 28.9* 29.2*   MCV 90.3 91.5   MCH 27.8 28.2   MCHC 30.8 30.8   RDW 15.2* 15.4*    372   MPV 9.9 9.8     Chemistry:  Recent Labs     21  1138 21  0907 21  0345   * 136 139   K 3.5* 2.7* 3.0*   CL 93* 102 104   CO2 23 22 27   GLUCOSE 102* 84 127*   BUN 8 2* <2*   CREATININE 0.26* <0.20* <0.20*   ANIONGAP 13 12 8*   LABGLOM >60 CANNOT BE CALCULATED CANNOT BE CALCULATED   GFRAA >60 CANNOT BE CALCULATED CANNOT BE CALCULATED   CALCIUM 8.2* 8.4* 8.0*   ABG:  Lab Results   Component Value Date    POCPH 7.446 2020    PHART 7.383 2020    POCPCO2 36.3 2020    JZP2SGU 36.1 2020    POCPO2 52.6 2020    PO2ART 470.0 2020    POCHCO3 25.0 2020    TNG8QTT 21.0 2020    NBEA NOT REPORTED 2020    PBEA 1 2020    AYI6GYZ 26 2020    ROGY4HVJ 88 2020    V6ITTXCQ 99.6 2020    FIO2 50.0 2020     Lab Results   Component Value Date/Time    SPECIAL NOT REPORTED 2021 11:12 PM     Lab Results   Component Value Date/Time    CULTURE ENTEROBACTER CLOACAE >692861 CFU/ML (A) 2021 11:12 PM    CULTURE ENTEROCOCCUS FAECALIS >781489 CFU/ML (A) 2021 11:12 PM       Radiology:  Ct Abdomen Pelvis Wo Contrast Additional Contrast? None    Result Date: 3/20/2021  1.   Findings

## 2021-03-23 NOTE — FLOWSHEET NOTE
Nurse writer:   Chetna Hook into patient room to complete Shift assessment the patient was not co-operative. Asked patient if he wanted to take take his morning medications before preparing 9:00 AM medications, he said he would take them. Once medications were prepared he refused to take his oral medications. The only mediation he allowed the RN to administer was the Lovenox injection. He has refused to be turned to prevent pressure ulcers and has refused to be cleaned or changed. Patient has been abusive towards nurses and staff regularly yelling and shouting profanities one example in his own words is  \"you're all acting and talking stupid\".

## 2021-03-23 NOTE — PROGRESS NOTES
Physical Therapy  DATE: 3/23/2021  NAME: Yaron Caldwell  MRN: [id number]   : 2000    Discharge Recommendations: Continue to Assess (pending progress)     Subjective: RN agreeable to PT. Pt in bed and alert upon arrival. Pt hesitant to begin treatment, but was eventually agreeable when given max encouragement. Pain: Pt reports 10/10 pain in B UE R>L. Pt would scream out in pain every now and then during treatment. Patient follows: Some Commands  Is patient on ventilator: No  Is patient on sedation: No  Precautions: General precautions, fall risk     Therapeutic exercises:  LE(s)  Passive range of motion all planes x 10 reps  bilateral gastrocnemius stretching 2 reps x 30 seconds  UE(s)  Gentle B shoulder flexion to patient tolerance with gentle stretch and hold at end range 2 reps x 60 seconds. Pt agreeable to supine exercises only this date secondary to UE pain. Goals  Short Term Goals  Short term goal 1: Pt to receive PROM bilateral LE's to maintain ROM and prevent contractures. Short term goal 2: Pt to transfer supine to sit with mod/max A +1. Short term goal 3: Pt to demonstrate good/fair sitting balance for decrease fall risk. Plan: Progress functional mobility as medically appropriate. Time In: 01:27  Time Out: 01:50  Time Coded Minutes (treatment minutes): 23  Rehab Potential: Fair  Treatments/week: 3-5x    Elizabeth Osstifin   Treatment performed by Student PTA under the supervision of co-signing PTA who agrees with all treatment and documentation.    Daylin Montes PTA

## 2021-03-23 NOTE — CARE COORDINATION
Discharge order noted. Met with pt to discuss transitional planning. He needs transportation and would like a different home care company. Referral sent to [facility]. [facility] unable to transport tonight. Request sent for 11am tomorrow    [id number] received call from Ivory at [facility]. They are unable to accept.  Referral sent to [facility]

## 2021-03-23 NOTE — PLAN OF CARE
Problem: Pain:  Goal: Pain level will decrease  Description: Pain level will decrease  Outcome: Ongoing  Goal: Control of acute pain  Description: Control of acute pain  Outcome: Ongoing  Goal: Control of chronic pain  Description: Control of chronic pain  Outcome: Ongoing     Problem: Falls - Risk of:  Goal: Will remain free from falls  Description: Will remain free from falls  Outcome: Ongoing  Goal: Absence of physical injury  Description: Absence of physical injury  Outcome: Ongoing     Problem: Skin Integrity:  Goal: Will show no infection signs and symptoms  Description: Will show no infection signs and symptoms  Outcome: Ongoing  Goal: Absence of new skin breakdown  Description: Absence of new skin breakdown  Outcome: Ongoing     Problem: Musculor/Skeletal Functional Status  Goal: Highest potential functional level  Outcome: Ongoing  Goal: Absence of falls  Outcome: Ongoing     Problem: Nutrition  Goal: Optimal nutrition therapy  Description: Nutrition Problem #1: Increased nutrient needs  Intervention: Food and/or Nutrient Delivery: Continue Current Diet, Start Oral Nutrition Supplement  Nutritional Goals: Meet greater than or equal to 75% of estimated nutrient needs with PO     Outcome: Ongoing

## 2021-03-24 VITALS
RESPIRATION RATE: 26 BRPM | HEART RATE: 92 BPM | SYSTOLIC BLOOD PRESSURE: 141 MMHG | HEIGHT: 65 IN | WEIGHT: 140.1 LBS | BODY MASS INDEX: 23.34 KG/M2 | TEMPERATURE: 98.5 F | OXYGEN SATURATION: 98 % | DIASTOLIC BLOOD PRESSURE: 82 MMHG

## 2021-03-24 LAB — INTERVENTION: NORMAL

## 2021-03-24 PROCEDURE — 99232 SBSQ HOSP IP/OBS MODERATE 35: CPT | Performed by: INTERNAL MEDICINE

## 2021-03-24 PROCEDURE — 6370000000 HC RX 637 (ALT 250 FOR IP): Performed by: NURSE PRACTITIONER

## 2021-03-24 PROCEDURE — 6370000000 HC RX 637 (ALT 250 FOR IP): Performed by: INTERNAL MEDICINE

## 2021-03-24 PROCEDURE — 6370000000 HC RX 637 (ALT 250 FOR IP): Performed by: STUDENT IN AN ORGANIZED HEALTH CARE EDUCATION/TRAINING PROGRAM

## 2021-03-24 PROCEDURE — 99232 SBSQ HOSP IP/OBS MODERATE 35: CPT | Performed by: FAMILY MEDICINE

## 2021-03-24 PROCEDURE — APPSS60 APP SPLIT SHARED TIME 46-60 MINUTES: Performed by: NURSE PRACTITIONER

## 2021-03-24 RX ADMIN — GABAPENTIN 300 MG: 300 CAPSULE ORAL at 09:48

## 2021-03-24 RX ADMIN — APIXABAN 10 MG: 5 TABLET, FILM COATED ORAL at 09:48

## 2021-03-24 RX ADMIN — QUETIAPINE FUMARATE 25 MG: 25 TABLET ORAL at 09:48

## 2021-03-24 RX ADMIN — ZONISAMIDE 100 MG: 100 CAPSULE ORAL at 09:49

## 2021-03-24 RX ADMIN — LEVOFLOXACIN 500 MG: 500 TABLET, FILM COATED ORAL at 09:50

## 2021-03-24 RX ADMIN — FAMOTIDINE 20 MG: 20 TABLET, FILM COATED ORAL at 09:48

## 2021-03-24 RX ADMIN — AMOXICILLIN 500 MG: 500 CAPSULE ORAL at 05:53

## 2021-03-24 RX ADMIN — TIZANIDINE 4 MG: 4 TABLET ORAL at 09:48
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
## 2021-03-24 NOTE — PLAN OF CARE
Continue Current Diet, Start Oral Nutrition Supplement  Nutritional Goals: Meet greater than or equal to 75% of estimated nutrient needs with PO     3/24/2021 0038 by Patricio Osler, RN  Outcome: Ongoing  3/23/2021 1810 by Stacey Johns RN  Outcome: Ongoing

## 2021-03-24 NOTE — CARE COORDINATION
Discharge [facility] Case Management Department  Written by: Elias Mendez RN    Patient Name: Marivel Ames  Attending Provider: Fabio Prasad MD  Admit Date: 3/19/2021  5:53 PM  MRN: [id number]  Account: [de-identified]                     : 2000  Discharge Date: 3/24/21  [facility]ed [facility] confirmed  time 10:30  Met with patient updated that we could not secure another home care company plan will be to return home with med 1 care.      Disposition: home with med 1 care called office spoke to [address] [place] [address] will retrieve dc paperwork from [address] [facility] [place] still pending set to plan for review yesterday @ 15:00 key [email or web address]    Elias Mendez RN
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
## 2021-03-25 LAB
CULTURE: NORMAL
CULTURE: NORMAL
Lab: NORMAL
Lab: NORMAL
SPECIMEN DESCRIPTION: NORMAL
SPECIMEN DESCRIPTION: NORMAL

## 2021-03-29 NOTE — PROGRESS NOTES
Physician Progress Note      PATIENT:               Fili Dale  CSN #:                  [id number]  :                       2000  ADMIT DATE:       3/19/2021 5:53 PM  [address] [place] DATE:        3/24/2021 11:42 AM  RESPONDING  PROVIDER #:        Katarzyna MORENO          QUERY TEXT:    Pt admitted with Sepsis and UTI  . Pt noted to have chronic suprapubic   catheter and stage 3 pressure ulcer to coccyx. If possible, please document   in the progress notes and discharge summary if you are evaluating and / or   treating any of the following: The medical record reflects the following:  Risk Factors: Chronic indwelling suprapubic catheter  Clinical Indicators: per discharge summary sepsis with underlying Enterobacter   and enterococcus UTI with neurogenic balder and history of chronic suprapubic   catheter    Treatment: iv antibiotics, monitoring,    Thank you Ankush Goyal RN CCDS BSN. . call if questions [phone]  Options provided:  -- Sepsis/UTI  related to suprapubic catheter  -- Sepsis/UTI  unrelated to suprapubic catheter  -- Other - I will add my own diagnosis  -- Disagree - Not applicable / Not valid  -- Disagree - Clinically unable to determine / Unknown  -- Refer to Clinical Documentation Reviewer    PROVIDER RESPONSE TEXT:    This patient has sepsis related to suprapubic catheter . Query created by:  Bk Chen on 3/29/2021 10:02 AM      Electronically signed by:  Antonette Bryan 3/29/2021 12:11 PM

## 2021-03-30 ENCOUNTER — HOSPITAL ENCOUNTER (EMERGENCY)
Age: 21
Discharge: HOME | End: 2021-03-30
Attending: EMERGENCY MEDICINE
Payer: MEDICAID

## 2021-03-30 VITALS
TEMPERATURE: 97.6 F | HEART RATE: 95 BPM | DIASTOLIC BLOOD PRESSURE: 75 MMHG | SYSTOLIC BLOOD PRESSURE: 124 MMHG | RESPIRATION RATE: 16 BRPM | OXYGEN SATURATION: 100 %

## 2021-03-30 DIAGNOSIS — R31.9 HEMATURIA, UNSPECIFIED TYPE: Primary | ICD-10-CM

## 2021-03-30 LAB
-: ABNORMAL
AMORPHOUS: ABNORMAL
BACTERIA: ABNORMAL
BILIRUBIN URINE: ABNORMAL
CASTS UA: ABNORMAL /LPF (ref 0–2)
COLOR: ABNORMAL
COMMENT UA: ABNORMAL
CRYSTALS, UA: ABNORMAL /HPF
CRYSTALS, UA: ABNORMAL /HPF
EPITHELIAL CELLS UA: ABNORMAL /HPF (ref 0–5)
GLUCOSE URINE: NEGATIVE
KETONES, URINE: NEGATIVE
LEUKOCYTE ESTERASE, URINE: ABNORMAL
MUCUS: ABNORMAL
NITRITE, URINE: NEGATIVE
OTHER OBSERVATIONS UA: ABNORMAL
PH UA: 8.5 (ref 5–8)
PROTEIN UA: ABNORMAL
RBC UA: ABNORMAL /HPF (ref 0–2)
RENAL EPITHELIAL, UA: ABNORMAL /HPF
SPECIFIC GRAVITY UA: 1.01 (ref 1–1.03)
TRICHOMONAS: ABNORMAL
TURBIDITY: ABNORMAL
URINE HGB: ABNORMAL
UROBILINOGEN, URINE: NORMAL
WBC UA: ABNORMAL /HPF (ref 0–5)
YEAST: ABNORMAL

## 2021-03-30 PROCEDURE — 6370000000 HC RX 637 (ALT 250 FOR IP): Performed by: STUDENT IN AN ORGANIZED HEALTH CARE EDUCATION/TRAINING PROGRAM

## 2021-03-30 PROCEDURE — 99284 EMERGENCY DEPT VISIT MOD MDM: CPT

## 2021-03-30 PROCEDURE — 81001 URINALYSIS AUTO W/SCOPE: CPT

## 2021-03-30 PROCEDURE — 87086 URINE CULTURE/COLONY COUNT: CPT

## 2021-03-30 RX ORDER — OXYCODONE HYDROCHLORIDE 5 MG/1
5 TABLET ORAL ONCE
Status: COMPLETED | OUTPATIENT
Start: 2021-03-30 | End: 2021-03-30

## 2021-03-30 RX ADMIN — OXYCODONE HYDROCHLORIDE 5 MG: 5 TABLET ORAL at 16:11

## 2021-03-30 ASSESSMENT — ENCOUNTER SYMPTOMS
ABDOMINAL DISTENTION: 0
RHINORRHEA: 0
WHEEZING: 0
CONSTIPATION: 0
COUGH: 0
ABDOMINAL PAIN: 0
VOMITING: 0
BACK PAIN: 0
DIARRHEA: 0
NAUSEA: 0
CHEST TIGHTNESS: 0
SORE THROAT: 0
TROUBLE SWALLOWING: 0

## 2021-03-30 ASSESSMENT — PAIN SCALES - GENERAL: PAINLEVEL_OUTOF10: 10

## 2021-03-30 ASSESSMENT — PAIN DESCRIPTION - ONSET: ONSET: ON-GOING

## 2021-03-30 ASSESSMENT — PAIN DESCRIPTION - ORIENTATION: ORIENTATION: RIGHT

## 2021-03-30 ASSESSMENT — PAIN DESCRIPTION - LOCATION: LOCATION: ARM
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
## 2021-03-30 NOTE — ED NOTES
Patient stated his cousin or brother will pick him up, ETA unknown. Writer informed DESTINI.       TASNEEM Jordan  03/30/21 0702

## 2021-03-30 NOTE — ED PROVIDER NOTES
[facility]     Emergency Department     Faculty Attestation    I performed a history and physical examination of the patient and discussed management with the resident. I reviewed the residents note and agree with the documented findings and plan of care. Any areas of disagreement are noted on the chart. I was personally present for the key portions of any procedures. I have documented in the chart those procedures where I was not present during the key portions. I have reviewed the emergency nurses triage note. I agree with the chief complaint, past medical history, past surgical history, allergies, medications, social, and family history as documented unless otherwise noted below.          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  Patient denies any shortness of breath, cough, or wheezing  No chest pain, heart racing, or dizziness  No abdominal pain  No nausea or vomiting  No stool changes  Patient has no additional concerns    On examination patient is somewhat thin but appears overall appropriately nourished  Heart is regular in rate and rhythm  Lungs are clear to auscultation  Abdomen is soft and nontender  Normal bowel sounds  Suprapubic catheter site appears clean, dry, and without infection  There is some urine in the catheter bag that is clearish red in appearance without gross hematuria or clots present    Patient has declined imaging or blood work at this time and only wants his urine checked    UA collected  Patient requesting discharge prior to results being available        Jaret Wang DO  Attending Emergency Physician       Florecita Cyr DO  03/30/21 9646

## 2021-03-30 NOTE — ED NOTES
Pt. family at bedside to  pt. Dr. Rafy Claudio to bedside to update family.       Celena Jeffers RN  03/30/21 9136
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
## 2021-03-30 NOTE — ED NOTES
Pt. Resting on stretcher in room 10. Call light within reach and bed in lowest position. All questions answered at this time. Will continue to monitor.       Batool Good RN  03/30/21 4805

## 2021-03-30 NOTE — ED NOTES
Patient's on phone with friend for possible ride home. Writer to assist with ambulance ride if needed.        TASNEEM Mccarthy  03/30/21 4082

## 2021-03-30 NOTE — ED NOTES
Bed: 10  Expected date:   Expected time:   Means of arrival:   Comments:  R Ed Islas RN  03/30/21 6842

## 2021-03-30 NOTE — ED NOTES
Writer to pt. Bedside for information on ride home. No transportation available till 0100.  to bedside.       Iban Cantu RN  03/30/21 5360

## 2021-03-30 NOTE — ED NOTES
Pt resting on stretcher, no respiratory distress noted, pt updated on plan of care, will continue to monitor, call light in reach.        Nikkie Cox RN  03/30/21 9529

## 2021-03-31 LAB
CULTURE: NORMAL
Lab: NORMAL
SPECIMEN DESCRIPTION: NORMAL

## 2021-05-02 ENCOUNTER — HOSPITAL ENCOUNTER (INPATIENT)
Age: 21
LOS: 7 days | Discharge: NURSING, REHAB OR LONG-TERM CARE | DRG: 950 | End: 2021-05-10
Attending: STUDENT IN AN ORGANIZED HEALTH CARE EDUCATION/TRAINING PROGRAM | Admitting: INTERNAL MEDICINE
Payer: MEDICAID

## 2021-05-02 ENCOUNTER — APPOINTMENT (OUTPATIENT)
Dept: GENERAL RADIOLOGY | Age: 21
DRG: 950 | End: 2021-05-02
Payer: MEDICAID

## 2021-05-02 ENCOUNTER — APPOINTMENT (OUTPATIENT)
Dept: CT IMAGING | Age: 21
DRG: 950 | End: 2021-05-02
Payer: MEDICAID

## 2021-05-02 DIAGNOSIS — A41.9 SEPTICEMIA (HCC): Primary | ICD-10-CM

## 2021-05-02 LAB
ABSOLUTE BANDS #: 0.17 K/UL (ref 0–1)
ABSOLUTE EOS #: 0 K/UL (ref 0–0.4)
ABSOLUTE IMMATURE GRANULOCYTE: ABNORMAL K/UL (ref 0–0.3)
ABSOLUTE LYMPH #: 2.38 K/UL (ref 1–4.8)
ABSOLUTE MONO #: 0.17 K/UL (ref 0.1–1.3)
ALBUMIN SERPL-MCNC: 3 G/DL (ref 3.5–5.2)
ALBUMIN/GLOBULIN RATIO: ABNORMAL (ref 1–2.5)
ALP BLD-CCNC: 120 U/L (ref 40–129)
ALT SERPL-CCNC: 7 U/L (ref 5–41)
ANION GAP SERPL CALCULATED.3IONS-SCNC: 10 MMOL/L (ref 9–17)
AST SERPL-CCNC: 14 U/L
BANDS: 1 % (ref 0–10)
BASOPHILS # BLD: 0 % (ref 0–2)
BASOPHILS ABSOLUTE: 0 K/UL (ref 0–0.2)
BILIRUB SERPL-MCNC: 0.34 MG/DL (ref 0.3–1.2)
BUN BLDV-MCNC: 6 MG/DL (ref 6–20)
BUN/CREAT BLD: ABNORMAL (ref 9–20)
CALCIUM SERPL-MCNC: 9.1 MG/DL (ref 8.6–10.4)
CHLORIDE BLD-SCNC: 98 MMOL/L (ref 98–107)
CO2: 28 MMOL/L (ref 20–31)
CREAT SERPL-MCNC: <0.4 MG/DL (ref 0.7–1.2)
DIFFERENTIAL TYPE: ABNORMAL
EOSINOPHILS RELATIVE PERCENT: 0 % (ref 0–4)
GFR AFRICAN AMERICAN: ABNORMAL ML/MIN
GFR NON-AFRICAN AMERICAN: ABNORMAL ML/MIN
GFR SERPL CREATININE-BSD FRML MDRD: ABNORMAL ML/MIN/{1.73_M2}
GFR SERPL CREATININE-BSD FRML MDRD: ABNORMAL ML/MIN/{1.73_M2}
GLUCOSE BLD-MCNC: 159 MG/DL (ref 70–99)
HCT VFR BLD CALC: 37.1 % (ref 41–53)
HEMOGLOBIN: 12.1 G/DL (ref 13.5–17.5)
IMMATURE GRANULOCYTES: ABNORMAL %
LACTIC ACID, SEPSIS WHOLE BLOOD: ABNORMAL MMOL/L (ref 0.5–1.9)
LACTIC ACID, SEPSIS: 2 MMOL/L (ref 0.5–1.9)
LYMPHOCYTES # BLD: 14 % (ref 25–45)
MAGNESIUM: 1.9 MG/DL (ref 1.6–2.6)
MCH RBC QN AUTO: 26.4 PG (ref 26–34)
MCHC RBC AUTO-ENTMCNC: 32.6 G/DL (ref 31–37)
MCV RBC AUTO: 80.9 FL (ref 80–100)
MONOCYTES # BLD: 1 % (ref 2–8)
MORPHOLOGY: ABNORMAL
NRBC AUTOMATED: ABNORMAL PER 100 WBC
PDW BLD-RTO: 17.4 % (ref 11.5–14.9)
PLATELET # BLD: 515 K/UL (ref 150–450)
PLATELET ESTIMATE: ABNORMAL
PMV BLD AUTO: 7.3 FL (ref 6–12)
POTASSIUM SERPL-SCNC: 3.4 MMOL/L (ref 3.7–5.3)
RBC # BLD: 4.59 M/UL (ref 4.5–5.9)
RBC # BLD: ABNORMAL 10*6/UL
SARS-COV-2, RAPID: NOT DETECTED
SEG NEUTROPHILS: 84 % (ref 34–64)
SEGMENTED NEUTROPHILS ABSOLUTE COUNT: 14.28 K/UL (ref 1.3–9.1)
SODIUM BLD-SCNC: 136 MMOL/L (ref 135–144)
SPECIMEN DESCRIPTION: NORMAL
TOTAL PROTEIN: 7.5 G/DL (ref 6.4–8.3)
WBC # BLD: 17 K/UL (ref 4.5–13.5)
WBC # BLD: ABNORMAL 10*3/UL

## 2021-05-02 PROCEDURE — 96361 HYDRATE IV INFUSION ADD-ON: CPT

## 2021-05-02 PROCEDURE — 83735 ASSAY OF MAGNESIUM: CPT

## 2021-05-02 PROCEDURE — 99284 EMERGENCY DEPT VISIT MOD MDM: CPT

## 2021-05-02 PROCEDURE — 87150 DNA/RNA AMPLIFIED PROBE: CPT

## 2021-05-02 PROCEDURE — 87205 SMEAR GRAM STAIN: CPT

## 2021-05-02 PROCEDURE — 87635 SARS-COV-2 COVID-19 AMP PRB: CPT

## 2021-05-02 PROCEDURE — 80053 COMPREHEN METABOLIC PANEL: CPT

## 2021-05-02 PROCEDURE — 85025 COMPLETE CBC W/AUTO DIFF WBC: CPT

## 2021-05-02 PROCEDURE — 87040 BLOOD CULTURE FOR BACTERIA: CPT

## 2021-05-02 PROCEDURE — 71045 X-RAY EXAM CHEST 1 VIEW: CPT

## 2021-05-02 PROCEDURE — 2580000003 HC RX 258: Performed by: STUDENT IN AN ORGANIZED HEALTH CARE EDUCATION/TRAINING PROGRAM

## 2021-05-02 PROCEDURE — 36415 COLL VENOUS BLD VENIPUNCTURE: CPT

## 2021-05-02 PROCEDURE — 83605 ASSAY OF LACTIC ACID: CPT

## 2021-05-02 RX ORDER — 0.9 % SODIUM CHLORIDE 0.9 %
1000 INTRAVENOUS SOLUTION INTRAVENOUS ONCE
Status: COMPLETED | OUTPATIENT
Start: 2021-05-02 | End: 2021-05-03

## 2021-05-02 RX ORDER — 0.9 % SODIUM CHLORIDE 0.9 %
80 INTRAVENOUS SOLUTION INTRAVENOUS ONCE
Status: COMPLETED | OUTPATIENT
Start: 2021-05-03 | End: 2021-05-03

## 2021-05-02 RX ORDER — SODIUM CHLORIDE 0.9 % (FLUSH) 0.9 %
10 SYRINGE (ML) INJECTION PRN
Status: DISCONTINUED | OUTPATIENT
Start: 2021-05-02 | End: 2021-05-04

## 2021-05-02 RX ADMIN — SODIUM CHLORIDE 1000 ML: 9 INJECTION, SOLUTION INTRAVENOUS at 23:42

## 2021-05-02 ASSESSMENT — ENCOUNTER SYMPTOMS
SORE THROAT: 0
COUGH: 0
RHINORRHEA: 0
COLOR CHANGE: 0
VOMITING: 0
NAUSEA: 0
DIARRHEA: 0
ABDOMINAL PAIN: 0
BACK PAIN: 0
SHORTNESS OF BREATH: 0
FACIAL SWELLING: 0
EYE REDNESS: 0
EYE PAIN: 0

## 2021-05-03 ENCOUNTER — APPOINTMENT (OUTPATIENT)
Dept: CT IMAGING | Age: 21
DRG: 950 | End: 2021-05-03
Payer: MEDICAID

## 2021-05-03 PROBLEM — Z79.01 CHRONIC ANTICOAGULATION: Status: ACTIVE | Noted: 2021-05-03

## 2021-05-03 PROBLEM — L03.90 CELLULITIS: Status: ACTIVE | Noted: 2021-05-03

## 2021-05-03 PROBLEM — Z86.718 HISTORY OF DVT (DEEP VEIN THROMBOSIS): Status: ACTIVE | Noted: 2021-05-03

## 2021-05-03 LAB
-: ABNORMAL
AMORPHOUS: ABNORMAL
BACTERIA: ABNORMAL
BILIRUBIN URINE: ABNORMAL
CASTS UA: ABNORMAL /LPF
COLOR: ABNORMAL
COMMENT UA: ABNORMAL
CRYSTALS, UA: ABNORMAL /HPF
CRYSTALS, UA: ABNORMAL /HPF
EPITHELIAL CELLS UA: ABNORMAL /HPF
GLUCOSE URINE: NEGATIVE
KETONES, URINE: NEGATIVE
LACTIC ACID, SEPSIS WHOLE BLOOD: NORMAL MMOL/L (ref 0.5–1.9)
LACTIC ACID, SEPSIS: 1.4 MMOL/L (ref 0.5–1.9)
LEUKOCYTE ESTERASE, URINE: ABNORMAL
MUCUS: ABNORMAL
NITRITE, URINE: POSITIVE
OTHER OBSERVATIONS UA: ABNORMAL
PH UA: 7 (ref 5–8)
PROTEIN UA: ABNORMAL
RBC UA: ABNORMAL /HPF
RENAL EPITHELIAL, UA: ABNORMAL /HPF
SPECIFIC GRAVITY UA: 1.09 (ref 1–1.03)
TRICHOMONAS: ABNORMAL
TURBIDITY: ABNORMAL
URINE HGB: ABNORMAL
UROBILINOGEN, URINE: ABNORMAL
WBC UA: ABNORMAL /HPF
YEAST: ABNORMAL

## 2021-05-03 PROCEDURE — 99223 1ST HOSP IP/OBS HIGH 75: CPT | Performed by: INTERNAL MEDICINE

## 2021-05-03 PROCEDURE — 2060000000 HC ICU INTERMEDIATE R&B

## 2021-05-03 PROCEDURE — 6360000002 HC RX W HCPCS: Performed by: NURSE PRACTITIONER

## 2021-05-03 PROCEDURE — 6370000000 HC RX 637 (ALT 250 FOR IP): Performed by: NURSE PRACTITIONER

## 2021-05-03 PROCEDURE — 87070 CULTURE OTHR SPECIMN AEROBIC: CPT

## 2021-05-03 PROCEDURE — 6360000002 HC RX W HCPCS: Performed by: STUDENT IN AN ORGANIZED HEALTH CARE EDUCATION/TRAINING PROGRAM

## 2021-05-03 PROCEDURE — 96367 TX/PROPH/DG ADDL SEQ IV INF: CPT

## 2021-05-03 PROCEDURE — 2580000003 HC RX 258: Performed by: NURSE PRACTITIONER

## 2021-05-03 PROCEDURE — 87086 URINE CULTURE/COLONY COUNT: CPT

## 2021-05-03 PROCEDURE — 87205 SMEAR GRAM STAIN: CPT

## 2021-05-03 PROCEDURE — 6360000004 HC RX CONTRAST MEDICATION: Performed by: STUDENT IN AN ORGANIZED HEALTH CARE EDUCATION/TRAINING PROGRAM

## 2021-05-03 PROCEDURE — 6370000000 HC RX 637 (ALT 250 FOR IP): Performed by: INTERNAL MEDICINE

## 2021-05-03 PROCEDURE — 2580000003 HC RX 258: Performed by: STUDENT IN AN ORGANIZED HEALTH CARE EDUCATION/TRAINING PROGRAM

## 2021-05-03 PROCEDURE — 81001 URINALYSIS AUTO W/SCOPE: CPT

## 2021-05-03 PROCEDURE — 83605 ASSAY OF LACTIC ACID: CPT

## 2021-05-03 PROCEDURE — 36415 COLL VENOUS BLD VENIPUNCTURE: CPT

## 2021-05-03 PROCEDURE — 99214 OFFICE O/P EST MOD 30 MIN: CPT

## 2021-05-03 PROCEDURE — 84134 ASSAY OF PREALBUMIN: CPT

## 2021-05-03 PROCEDURE — 96365 THER/PROPH/DIAG IV INF INIT: CPT

## 2021-05-03 PROCEDURE — 74177 CT ABD & PELVIS W/CONTRAST: CPT

## 2021-05-03 RX ORDER — ONDANSETRON 2 MG/ML
4 INJECTION INTRAMUSCULAR; INTRAVENOUS EVERY 6 HOURS PRN
Status: DISCONTINUED | OUTPATIENT
Start: 2021-05-03 | End: 2021-05-10 | Stop reason: HOSPADM

## 2021-05-03 RX ORDER — ACETAMINOPHEN 325 MG/1
650 TABLET ORAL EVERY 6 HOURS PRN
Status: DISCONTINUED | OUTPATIENT
Start: 2021-05-03 | End: 2021-05-10 | Stop reason: HOSPADM

## 2021-05-03 RX ORDER — SODIUM CHLORIDE 0.9 % (FLUSH) 0.9 %
10 SYRINGE (ML) INJECTION PRN
Status: DISCONTINUED | OUTPATIENT
Start: 2021-05-03 | End: 2021-05-10 | Stop reason: HOSPADM

## 2021-05-03 RX ORDER — LANOLIN ALCOHOL/MO/W.PET/CERES
3 CREAM (GRAM) TOPICAL NIGHTLY
Status: DISCONTINUED | OUTPATIENT
Start: 2021-05-03 | End: 2021-05-10 | Stop reason: HOSPADM

## 2021-05-03 RX ORDER — DOCUSATE SODIUM 100 MG/1
100 CAPSULE, LIQUID FILLED ORAL 3 TIMES DAILY
Status: DISCONTINUED | OUTPATIENT
Start: 2021-05-03 | End: 2021-05-10 | Stop reason: HOSPADM

## 2021-05-03 RX ORDER — PROMETHAZINE HYDROCHLORIDE 25 MG/1
12.5 TABLET ORAL EVERY 6 HOURS PRN
Status: DISCONTINUED | OUTPATIENT
Start: 2021-05-03 | End: 2021-05-10 | Stop reason: HOSPADM

## 2021-05-03 RX ORDER — FAMOTIDINE 20 MG/1
20 TABLET, FILM COATED ORAL DAILY
Status: DISCONTINUED | OUTPATIENT
Start: 2021-05-03 | End: 2021-05-10 | Stop reason: HOSPADM

## 2021-05-03 RX ORDER — GABAPENTIN 300 MG/1
300 CAPSULE ORAL 3 TIMES DAILY
Status: DISCONTINUED | OUTPATIENT
Start: 2021-05-03 | End: 2021-05-10 | Stop reason: HOSPADM

## 2021-05-03 RX ORDER — MAGNESIUM SULFATE 1 G/100ML
1000 INJECTION INTRAVENOUS PRN
Status: DISCONTINUED | OUTPATIENT
Start: 2021-05-03 | End: 2021-05-10 | Stop reason: HOSPADM

## 2021-05-03 RX ORDER — SODIUM CHLORIDE 9 MG/ML
25 INJECTION, SOLUTION INTRAVENOUS PRN
Status: DISCONTINUED | OUTPATIENT
Start: 2021-05-03 | End: 2021-05-10 | Stop reason: HOSPADM

## 2021-05-03 RX ORDER — OMEGA-3S/DHA/EPA/FISH OIL/D3 300MG-1000
400 CAPSULE ORAL DAILY
Status: DISCONTINUED | OUTPATIENT
Start: 2021-05-03 | End: 2021-05-10 | Stop reason: HOSPADM

## 2021-05-03 RX ORDER — POLYETHYLENE GLYCOL 3350 17 G/17G
17 POWDER, FOR SOLUTION ORAL DAILY PRN
Status: DISCONTINUED | OUTPATIENT
Start: 2021-05-03 | End: 2021-05-10 | Stop reason: HOSPADM

## 2021-05-03 RX ORDER — DULOXETIN HYDROCHLORIDE 60 MG/1
60 CAPSULE, DELAYED RELEASE ORAL DAILY
Status: DISCONTINUED | OUTPATIENT
Start: 2021-05-03 | End: 2021-05-08

## 2021-05-03 RX ORDER — POTASSIUM CHLORIDE 20 MEQ/1
40 TABLET, EXTENDED RELEASE ORAL PRN
Status: DISCONTINUED | OUTPATIENT
Start: 2021-05-03 | End: 2021-05-10 | Stop reason: HOSPADM

## 2021-05-03 RX ORDER — OXYCODONE AND ACETAMINOPHEN 10; 325 MG/1; MG/1
1 TABLET ORAL EVERY 6 HOURS PRN
Status: DISCONTINUED | OUTPATIENT
Start: 2021-05-03 | End: 2021-05-03 | Stop reason: CLARIF

## 2021-05-03 RX ORDER — POTASSIUM CHLORIDE 7.45 MG/ML
10 INJECTION INTRAVENOUS PRN
Status: DISCONTINUED | OUTPATIENT
Start: 2021-05-03 | End: 2021-05-10 | Stop reason: HOSPADM

## 2021-05-03 RX ORDER — SODIUM CHLORIDE 9 MG/ML
INJECTION, SOLUTION INTRAVENOUS CONTINUOUS
Status: DISCONTINUED | OUTPATIENT
Start: 2021-05-03 | End: 2021-05-10 | Stop reason: HOSPADM

## 2021-05-03 RX ORDER — SODIUM CHLORIDE 0.9 % (FLUSH) 0.9 %
5-40 SYRINGE (ML) INJECTION EVERY 12 HOURS SCHEDULED
Status: DISCONTINUED | OUTPATIENT
Start: 2021-05-03 | End: 2021-05-10 | Stop reason: HOSPADM

## 2021-05-03 RX ORDER — ALBUTEROL SULFATE 2.5 MG/3ML
2.5 SOLUTION RESPIRATORY (INHALATION) EVERY 4 HOURS PRN
Status: DISCONTINUED | OUTPATIENT
Start: 2021-05-03 | End: 2021-05-10 | Stop reason: HOSPADM

## 2021-05-03 RX ORDER — OXYCODONE HYDROCHLORIDE 5 MG/1
5 TABLET ORAL EVERY 6 HOURS PRN
Status: DISCONTINUED | OUTPATIENT
Start: 2021-05-03 | End: 2021-05-04

## 2021-05-03 RX ORDER — ZONISAMIDE 100 MG/1
100 CAPSULE ORAL DAILY
Status: DISCONTINUED | OUTPATIENT
Start: 2021-05-03 | End: 2021-05-10 | Stop reason: HOSPADM

## 2021-05-03 RX ORDER — ACETAMINOPHEN 650 MG/1
650 SUPPOSITORY RECTAL EVERY 6 HOURS PRN
Status: DISCONTINUED | OUTPATIENT
Start: 2021-05-03 | End: 2021-05-10 | Stop reason: HOSPADM

## 2021-05-03 RX ORDER — QUETIAPINE FUMARATE 50 MG/1
25 TABLET, FILM COATED ORAL 2 TIMES DAILY
Status: DISCONTINUED | OUTPATIENT
Start: 2021-05-03 | End: 2021-05-03

## 2021-05-03 RX ORDER — NICOTINE 21 MG/24HR
1 PATCH, TRANSDERMAL 24 HOURS TRANSDERMAL DAILY PRN
Status: DISCONTINUED | OUTPATIENT
Start: 2021-05-03 | End: 2021-05-10 | Stop reason: HOSPADM

## 2021-05-03 RX ORDER — FLUTICASONE PROPIONATE 50 MCG
1 SPRAY, SUSPENSION (ML) NASAL DAILY
Status: DISCONTINUED | OUTPATIENT
Start: 2021-05-03 | End: 2021-05-10 | Stop reason: HOSPADM

## 2021-05-03 RX ORDER — LORAZEPAM 0.5 MG/1
0.5 TABLET ORAL EVERY 6 HOURS PRN
Status: DISCONTINUED | OUTPATIENT
Start: 2021-05-03 | End: 2021-05-10 | Stop reason: HOSPADM

## 2021-05-03 RX ORDER — OXYCODONE HYDROCHLORIDE AND ACETAMINOPHEN 5; 325 MG/1; MG/1
1 TABLET ORAL EVERY 6 HOURS PRN
Status: DISCONTINUED | OUTPATIENT
Start: 2021-05-03 | End: 2021-05-07

## 2021-05-03 RX ADMIN — GABAPENTIN 300 MG: 300 CAPSULE ORAL at 09:58

## 2021-05-03 RX ADMIN — IOPAMIDOL 75 ML: 755 INJECTION, SOLUTION INTRAVENOUS at 00:54

## 2021-05-03 RX ADMIN — SODIUM CHLORIDE 80 ML: 9 INJECTION, SOLUTION INTRAVENOUS at 00:54

## 2021-05-03 RX ADMIN — CEFEPIME 2000 MG: 2 INJECTION, POWDER, FOR SOLUTION INTRAVENOUS at 09:58

## 2021-05-03 RX ADMIN — SODIUM CHLORIDE: 9 INJECTION, SOLUTION INTRAVENOUS at 10:22

## 2021-05-03 RX ADMIN — CEFEPIME HYDROCHLORIDE 2000 MG: 2 INJECTION, POWDER, FOR SOLUTION INTRAVENOUS at 00:12

## 2021-05-03 RX ADMIN — SODIUM CHLORIDE, PRESERVATIVE FREE 10 ML: 5 INJECTION INTRAVENOUS at 00:54

## 2021-05-03 RX ADMIN — ZONISAMIDE 100 MG: 100 CAPSULE ORAL at 14:52

## 2021-05-03 RX ADMIN — SODIUM CHLORIDE: 9 INJECTION, SOLUTION INTRAVENOUS at 03:55

## 2021-05-03 RX ADMIN — Medication 3 MG: at 21:09

## 2021-05-03 RX ADMIN — GABAPENTIN 300 MG: 300 CAPSULE ORAL at 21:09

## 2021-05-03 RX ADMIN — APIXABAN 5 MG: 5 TABLET, FILM COATED ORAL at 09:58

## 2021-05-03 RX ADMIN — ACETAMINOPHEN 650 MG: 325 TABLET, FILM COATED ORAL at 04:13

## 2021-05-03 RX ADMIN — DOCUSATE SODIUM 100 MG: 100 CAPSULE, LIQUID FILLED ORAL at 12:59

## 2021-05-03 RX ADMIN — GABAPENTIN 300 MG: 300 CAPSULE ORAL at 12:59

## 2021-05-03 RX ADMIN — SODIUM CHLORIDE: 9 INJECTION, SOLUTION INTRAVENOUS at 22:16

## 2021-05-03 RX ADMIN — CEFEPIME 2000 MG: 2 INJECTION, POWDER, FOR SOLUTION INTRAVENOUS at 18:24

## 2021-05-03 RX ADMIN — DOCUSATE SODIUM 100 MG: 100 CAPSULE, LIQUID FILLED ORAL at 21:09

## 2021-05-03 RX ADMIN — OXYCODONE HYDROCHLORIDE 5 MG: 5 TABLET ORAL at 23:03

## 2021-05-03 RX ADMIN — APIXABAN 5 MG: 5 TABLET, FILM COATED ORAL at 21:09

## 2021-05-03 RX ADMIN — OXYCODONE HYDROCHLORIDE AND ACETAMINOPHEN 1 TABLET: 5; 325 TABLET ORAL at 23:03

## 2021-05-03 RX ADMIN — VANCOMYCIN HYDROCHLORIDE 1500 MG: 1.5 INJECTION, POWDER, LYOPHILIZED, FOR SOLUTION INTRAVENOUS at 00:45

## 2021-05-03 RX ADMIN — VANCOMYCIN HYDROCHLORIDE 1000 MG: 1 INJECTION, POWDER, LYOPHILIZED, FOR SOLUTION INTRAVENOUS at 12:59

## 2021-05-03 ASSESSMENT — PAIN DESCRIPTION - DESCRIPTORS: DESCRIPTORS: BURNING;TINGLING;CONSTANT

## 2021-05-03 ASSESSMENT — ENCOUNTER SYMPTOMS
SORE THROAT: 0
SHORTNESS OF BREATH: 0
ABDOMINAL PAIN: 0
NAUSEA: 0
BACK PAIN: 0
WHEEZING: 0
VOMITING: 0
COUGH: 0
CONSTIPATION: 0
DIARRHEA: 0

## 2021-05-03 ASSESSMENT — PAIN SCALES - GENERAL
PAINLEVEL_OUTOF10: 10
PAINLEVEL_OUTOF10: 2
PAINLEVEL_OUTOF10: 6

## 2021-05-03 NOTE — CARE COORDINATION
[facility] Imre U. 12. Encounter Date/Time: 2021 [address] Account: [de-identified]    MRN: [id number]    Patient: Dunia Bernabe    Contact Serial #: [id number]      ENCOUNTER          Patient Class: I Private Enc? No Unit RM BD: [facility] [place] [facility] PROG    Hospital Service: Intermediate   Encounter DX: Cellulitis [L03.90]   ADM Provider: Walt Gonsales MD   Procedure:     ATT Provider: Walt Gonsales MD   REF Provider:        Admission DX: Cellulitis and codes: 682. 9      PATIENT                 Name: Dunia Bernabe : 2000 (20 yrs)   Address: [address] [place] [address] Sex: Male   [place] city: [address] [place] [address]         Marital Status: Single   Employer: DISABLED         Gnosticism: Mandaeism   Primary Care Provider: Yaneth Pruitt MD         Primary Phone: [phone]   EMERGENCY CONTACT   Contact Name Legal Guardian? Relationship to Patient Home Phone Work Phone   1. Karina Rosharon  2. Anitha Cape    Parent  Other [phone]              GUARANTOR            Guarantor: Dunia Bernabe     : 2000   [facility] Sex: [place]     [place] [address]     Relation to Patient: Self       Home Phone: [phone]   Guarantor ID: [id number]       Work Phone:     Guarantor Employer: DISABLED         Status: DISABLED      COVERAGE        PRIMARY INSURANCE   Payor: [place]ICA[place] Plan: MEDICAID [facility] DEPT OF*   Payor Address: [address]       Group Number:   Insurance Type: INDEMNITY   Subscriber Name: Ghazal Figueroa : 2000   Subscriber ID: [id number] Pat. Rel. to Sub: Self   SECONDARY INSURANCE   Payor:   Plan:     Payor Address:  ,           Group Number:   Insurance Type:     Subscriber Name:   Subscriber :     Subscriber ID:   Pat.  Rel. to Sub:             CSN                                    Req/Control # [Problem retrieving Specimen ID]                                   Order Date:  May 3, 2021  [id number]                                          Patient Information      Name:  Dhaval López  :  2000  Age:  23 y.o. Address:  [address] [place] [address]   Zip:  [address]  PCP: Naga Delaney MD Sex:  Alexis [place]: [id number]  Home Phone: [phone]  Work Phone:    Patient MRN:  [id number]    Alt Patient ID:  [id number]  PCP Phone: [phone]       Authorizing Provider Information       AUTHORIZING PROVIDER: Kathy Heck MD  Physician ID: [id number]  NPI:  [id number]  Site:   Address: [address], [address] [place] [address]  Phone: [phone]  Fax: [phone] [address] [facility]  DME Order for [address] [place] as OP [DME03] (ORD   #:   [id number]) Priority  Routine Class  Hospital Performed        Associated Diagnosis:          Comments:    You must complete the order parameters below and add the medical necessity documentation for this DME in a separate note.     [employer] hospital bed with mattress and any type rails     Current patient weight: Weight: 140 lb (63.5 kg)  Current patient height: Height: 5' 5\" (165.1 cm)  Diagnosis: Paraplegia      Length of need: Lifetime            Scheduling Instructions:                                 Specimen Source             Collection Date    Collection Time    Order Status  Standing Expected Date                Electronically Signed By  Kathy Heck MD Date  May 3, 2021           Responsible Party [place] Information     Guar-ActID   Relationship Account Type Home Phone   Pernell Caldera [phone] [address] [place] [address] Dr Dorinda Boyce, [address] [place] [address] P/F [phone]   Employer   Work Phone   DISABLED              [address]     Primary Insurance  Insurance/Subscriber ID:  [id number]  [address] Name:  Bill Alves              Relationship to Patient: SelfSigned ABN: N    Payor Name:  [place]ICAID [place]   Plan:  [facility] DEPT OF JOB   Group:   Worker's Comp Date of Injury:

## 2021-05-03 NOTE — ED NOTES
Report given toAmy RN from [address] by phone   The following was reviewed with receiving RN:   Current vital signs:  /83   Pulse 106   Temp 99 °F (37.2 °C) (Oral)   Resp 17   Ht 5' 5\" (1.651 m)   Wt 140 lb (63.5 kg)   SpO2 98%   BMI 23.30 kg/m²                MEWS Score: 4     Any medication or safety alerts were reviewed. Any pending diagnostics and notifications were also reviewed, as well as any safety concerns or issues, abnormal labs, abnormal imaging, and abnormal assessment findings. Questions were answered.           Kevin Garcia RN  05/03/21 2346

## 2021-05-03 NOTE — CONSULTS
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 Monik Fam      History of Present Illness:   Initial history:  Bird Vinson is a 23y.o.-year-old male quadriplegic secondary to spinal cord injury presented to the hospital with worsening chills and back pain for several days. He had multiple wounds to the coccyx, bilateral feet and ankles. He was tachycardic on admission, WBC 17  Urinalysis showed moderate leukocyte esterase, 20-50 WBCs  CT abdomen and pelvis showed left gluteal fat stranding suggestive of cellulitis, no abscess, urinary bladder calculi, unchanged appearance of coccyx decubitus ulcer.   COVID-19 rapid test was negative  Chest x-ray no acute process  Interval changes  5/3/2021   Patient Vitals for the past 8 hrs:   BP Temp Temp src Pulse Resp SpO2   05/03/21 0800 (!) 106/58 99.2 °F (37.3 °C) Oral 113 17 99 %   05/03/21 0645 -- -- -- 106 17 98 %   05/03/21 0615 119/83 -- -- 111 27 98 %   05/03/21 0600 109/70 -- -- 114 19 99 %   05/03/21 0545 103/65 -- -- 112 10 98 %   05/03/21 0530 (!) 105/58 -- -- 108 16 98 %   05/03/21 0515 (!) 97/58 -- -- 104 13 97 %           I have personally reviewed the past medical history, past surgical history, medications, social history, and family history, and I haveupdated the database accordingly. Allergies:   Patient has no known allergies. Review of Systems:     Review of Systems  As per history of present illness, other than above 12 system review was negative  Physical Examination :       Physical Exam  Constitutional:       General: He is not in acute distress. HENT:      Head: Normocephalic and atraumatic. Right Ear: External ear normal.      Left Ear: External ear normal.   Eyes:      General:         Left eye: No discharge. Conjunctiva/sclera: Conjunctivae normal.   Cardiovascular:      Rate and Rhythm: Normal rate and regular rhythm. Heart sounds: No murmur. Pulmonary:      Effort: Pulmonary effort is normal. No respiratory distress. Breath sounds: No wheezing. Abdominal:      General: There is no distension. Palpations: Abdomen is soft. Tenderness: There is no abdominal tenderness. Comments: Suprapubic catheter in place   Skin:     General: Skin is warm. Coloration: Skin is not jaundiced. Comments: Stage IV sacral decubitus ulcer with surrounding erythema, no fluctuation, no crepitance  Multiple wounds to the feet and lateral ankles  Left lateral hip wound   Neurological:      Mental Status: He is alert and oriented to person, place, and time. Comments: Quadriplegia         Past Medical History:   History reviewed. No pertinent past medical history.     Past Surgical  History:     Past Surgical History:   Procedure Laterality Date    CERVICAL FUSION N/A 12/1/2020    C7, CORPECTOMY WITH LUMBAR DRAIN PLACEMENT performed by Autumn Bourgeois DO at [facility] N/A 12/3/2020    EGD PEG TUBE PLACEMENT performed by Dexter Arreguin MD at [address] [place] [address]  02/09/2021    MOUTH HARDWARE REMOVAL - HYBRID IMF    HARDWARE REMOVAL N/A 2/9/2021    MOUTH HARDWARE REMOVAL - HYBRID IMF performed by Ceferino Ruiz MD at [place] [address] Drug use: Not Currently     Frequency: 1.0 times per week     Types: Marijuana     Comment: last time was late november 2020    Sexual activity: Not on file   Lifestyle    Physical activity     Days per week: Not on file     Minutes per session: Not on file    Stress: Not on file   Relationships    Social connections     Talks on phone: Not on file     Gets together: Not on file     Attends Islam service: Not on file     Active member of club or organization: Not on file     Attends meetings of clubs or organizations: Not on file     Relationship status: Not on file    Intimate partner violence     Fear of current or ex partner: Not on file     Emotionally abused: Not on file     Physically abused: Not on file     Forced sexual activity: Not on file   Other Topics Concern    Not on file   Social History Narrative    ** Merged History Encounter **         Lives with dad, sister, and grandma. In 9th grade. Family History:     Family History   Problem Relation Age of Onset    Asthma Paternal Uncle     High Blood Pressure Paternal Grandmother       Medical Decision Making:   I have independently reviewed/ordered the following labs:    CBC with Differential:   Recent Labs     05/02/21  2250   WBC 17.0*   HGB 12.1*   HCT 37.1*   *   LYMPHOPCT 14*   MONOPCT 1*     BMP:  Recent Labs     05/02/21  2250      K 3.4*   CL 98   CO2 28   BUN 6   CREATININE <0.40*   MG 1.9     Hepatic Function Panel:   Recent Labs     05/02/21  2250   PROT 7.5   LABALBU 3.0*   BILITOT 0.34   ALKPHOS 120   ALT 7   AST 14     No results for input(s): RPR in the last 72 hours. No results for input(s): HIV in the last 72 hours. No results for input(s): BC in the last 72 hours. Lab Results   Component Value Date    CREATININE <0.40 05/02/2021    GLUCOSE 159 05/02/2021       Detailed results: Thank you for allowing us to participate in the care of this patient. Please call with questions.     This note is created with the assistance of a speech recognition program.  While intending to generate adocument that actually reflects the content of the visit, the document can still have some errors including those of syntax and sound a like substitutions which may escape proof reading. It such instances, actual meaningcan be extrapolated by contextual diversion.     Yin Clark MD  Office: [phone]  Perfect serve / office [phone]

## 2021-05-03 NOTE — ED NOTES
Pt transported to room 2091 by hospital bed at 0729. Handoff given to [facility] the unit clerk at 2392.      [employer]  05/03/21 4597

## 2021-05-03 NOTE — ED NOTES
Rolled pt, soaked an abd pad in sterile saline and covered coccyx wound. Applied dry abd and taped in place. Changed bedding and repositioned pt with pillows so that he is on his right side at about 20-30 degrees. Placed side table on right side of bed so pt can reach his phone, ice water and urinal. Pt unable to grasp objects with his right hand and so this arrangement allows pt to grab what he needs.      Kandis Nunez RN  05/03/21 6708

## 2021-05-03 NOTE — CARE COORDINATION
Jenny at [address]. Pt can be accepted back for VNS. Notified pt that order for hospital bed and documentation was sent in to Rosa Soto to see if insurance will cover hospital bed for him. He was appreciative.     Electronically signed by Samantha Novoa RN on 5/3/2021 at 2:41 PM
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
## 2021-05-03 NOTE — PROGRESS NOTES
Pharmacy Note  Vancomycin Consult       Vancomycin Day: 2  Dose = 1000 mg every 12 hours. 1 pm/1 am  Plan:  Trough level tomorrow at 12noon. Patient's labs, cultures, vitals, and vancomycin regimen reviewed. No changes today. Alexei Alexander. Ph.  5/3/2021  11:03 AM
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
## 2021-05-03 NOTE — PROGRESS NOTES
Physical Therapy    DATE: 5/3/2021    NAME: Chad Waldron  MRN: [id number]   : 2000      Patient not seen this date for Physical Therapy due to: Other: Pt refusing physical therapy, educated in purpose of therapy assessment, pt kept saying \"I am not a mood to answer any questions. \" Pt did not commit to this therapist to re-attempt next day. Will attempt next day.        Electronically signed by Rosa Victoria PT on 5/3/2021 at 3:34 PM
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
## 2021-05-04 PROBLEM — R78.81 MRSA BACTEREMIA: Status: ACTIVE | Noted: 2021-05-04

## 2021-05-04 PROBLEM — B95.62 MRSA BACTEREMIA: Status: ACTIVE | Noted: 2021-05-04

## 2021-05-04 LAB
ANION GAP SERPL CALCULATED.3IONS-SCNC: 8 MMOL/L (ref 9–17)
BUN BLDV-MCNC: 2 MG/DL (ref 6–20)
BUN/CREAT BLD: ABNORMAL (ref 9–20)
C-REACTIVE PROTEIN: 148.8 MG/L (ref 0–5)
CALCIUM SERPL-MCNC: 8.5 MG/DL (ref 8.6–10.4)
CHLORIDE BLD-SCNC: 102 MMOL/L (ref 98–107)
CO2: 26 MMOL/L (ref 20–31)
CREAT SERPL-MCNC: <0.4 MG/DL (ref 0.7–1.2)
CULTURE: ABNORMAL
CULTURE: NORMAL
DIRECT EXAM: ABNORMAL
DIRECT EXAM: ABNORMAL
GFR AFRICAN AMERICAN: ABNORMAL ML/MIN
GFR NON-AFRICAN AMERICAN: ABNORMAL ML/MIN
GFR SERPL CREATININE-BSD FRML MDRD: ABNORMAL ML/MIN/{1.73_M2}
GFR SERPL CREATININE-BSD FRML MDRD: ABNORMAL ML/MIN/{1.73_M2}
GLUCOSE BLD-MCNC: 95 MG/DL (ref 70–99)
HCT VFR BLD CALC: 32.2 % (ref 41–53)
HEMOGLOBIN: 10.3 G/DL (ref 13.5–17.5)
LV EF: 55 %
LVEF MODALITY: NORMAL
Lab: ABNORMAL
Lab: ABNORMAL
Lab: NORMAL
MCH RBC QN AUTO: 26.5 PG (ref 26–34)
MCHC RBC AUTO-ENTMCNC: 32.1 G/DL (ref 31–37)
MCV RBC AUTO: 82.4 FL (ref 80–100)
NRBC AUTOMATED: ABNORMAL PER 100 WBC
PDW BLD-RTO: 17.5 % (ref 11.5–14.9)
PLATELET # BLD: 476 K/UL (ref 150–450)
PMV BLD AUTO: 6.8 FL (ref 6–12)
POTASSIUM SERPL-SCNC: 3.6 MMOL/L (ref 3.7–5.3)
PREALBUMIN: 12.4 MG/DL (ref 20–40)
RBC # BLD: 3.91 M/UL (ref 4.5–5.9)
SEDIMENTATION RATE, ERYTHROCYTE: 56 MM (ref 0–15)
SODIUM BLD-SCNC: 136 MMOL/L (ref 135–144)
SPECIMEN DESCRIPTION: ABNORMAL
SPECIMEN DESCRIPTION: ABNORMAL
SPECIMEN DESCRIPTION: NORMAL
VANCOMYCIN TROUGH DATE LAST DOSE: ABNORMAL
VANCOMYCIN TROUGH DOSE AMOUNT: 1000
VANCOMYCIN TROUGH TIME LAST DOSE: 104
VANCOMYCIN TROUGH: 4.1 UG/ML (ref 10–20)
WBC # BLD: 9.3 K/UL (ref 4.5–13.5)

## 2021-05-04 PROCEDURE — 6370000000 HC RX 637 (ALT 250 FOR IP): Performed by: NURSE PRACTITIONER

## 2021-05-04 PROCEDURE — 99254 IP/OBS CNSLTJ NEW/EST MOD 60: CPT | Performed by: INTERNAL MEDICINE

## 2021-05-04 PROCEDURE — 99233 SBSQ HOSP IP/OBS HIGH 50: CPT | Performed by: INTERNAL MEDICINE

## 2021-05-04 PROCEDURE — 36415 COLL VENOUS BLD VENIPUNCTURE: CPT

## 2021-05-04 PROCEDURE — 2060000000 HC ICU INTERMEDIATE R&B

## 2021-05-04 PROCEDURE — 2580000003 HC RX 258: Performed by: NURSE PRACTITIONER

## 2021-05-04 PROCEDURE — 2580000003 HC RX 258: Performed by: INTERNAL MEDICINE

## 2021-05-04 PROCEDURE — 80202 ASSAY OF VANCOMYCIN: CPT

## 2021-05-04 PROCEDURE — 85027 COMPLETE CBC AUTOMATED: CPT

## 2021-05-04 PROCEDURE — 6360000002 HC RX W HCPCS: Performed by: STUDENT IN AN ORGANIZED HEALTH CARE EDUCATION/TRAINING PROGRAM

## 2021-05-04 PROCEDURE — 6370000000 HC RX 637 (ALT 250 FOR IP): Performed by: INTERNAL MEDICINE

## 2021-05-04 PROCEDURE — 93306 TTE W/DOPPLER COMPLETE: CPT

## 2021-05-04 PROCEDURE — 6360000002 HC RX W HCPCS: Performed by: INTERNAL MEDICINE

## 2021-05-04 PROCEDURE — 2580000003 HC RX 258: Performed by: STUDENT IN AN ORGANIZED HEALTH CARE EDUCATION/TRAINING PROGRAM

## 2021-05-04 PROCEDURE — 86140 C-REACTIVE PROTEIN: CPT

## 2021-05-04 PROCEDURE — 6360000002 HC RX W HCPCS: Performed by: NURSE PRACTITIONER

## 2021-05-04 PROCEDURE — 85652 RBC SED RATE AUTOMATED: CPT

## 2021-05-04 PROCEDURE — 80048 BASIC METABOLIC PNL TOTAL CA: CPT

## 2021-05-04 RX ADMIN — CEFEPIME 2000 MG: 2 INJECTION, POWDER, FOR SOLUTION INTRAVENOUS at 00:19

## 2021-05-04 RX ADMIN — DOCUSATE SODIUM 100 MG: 100 CAPSULE, LIQUID FILLED ORAL at 08:26

## 2021-05-04 RX ADMIN — GABAPENTIN 300 MG: 300 CAPSULE ORAL at 22:27

## 2021-05-04 RX ADMIN — SODIUM CHLORIDE: 9 INJECTION, SOLUTION INTRAVENOUS at 08:27

## 2021-05-04 RX ADMIN — CEFEPIME 2000 MG: 2 INJECTION, POWDER, FOR SOLUTION INTRAVENOUS at 17:59

## 2021-05-04 RX ADMIN — GABAPENTIN 300 MG: 300 CAPSULE ORAL at 08:26

## 2021-05-04 RX ADMIN — VANCOMYCIN HYDROCHLORIDE 1000 MG: 1 INJECTION, POWDER, LYOPHILIZED, FOR SOLUTION INTRAVENOUS at 15:15

## 2021-05-04 RX ADMIN — DOCUSATE SODIUM 100 MG: 100 CAPSULE, LIQUID FILLED ORAL at 22:27

## 2021-05-04 RX ADMIN — OXYCODONE HYDROCHLORIDE AND ACETAMINOPHEN 1 TABLET: 5; 325 TABLET ORAL at 10:05

## 2021-05-04 RX ADMIN — VANCOMYCIN HYDROCHLORIDE 1000 MG: 1 INJECTION, POWDER, LYOPHILIZED, FOR SOLUTION INTRAVENOUS at 01:04

## 2021-05-04 RX ADMIN — OXYCODONE HYDROCHLORIDE AND ACETAMINOPHEN 1 TABLET: 5; 325 TABLET ORAL at 22:27

## 2021-05-04 RX ADMIN — CEFEPIME 2000 MG: 2 INJECTION, POWDER, FOR SOLUTION INTRAVENOUS at 08:26

## 2021-05-04 RX ADMIN — APIXABAN 5 MG: 5 TABLET, FILM COATED ORAL at 22:27

## 2021-05-04 RX ADMIN — SODIUM CHLORIDE: 9 INJECTION, SOLUTION INTRAVENOUS at 17:52

## 2021-05-04 RX ADMIN — DULOXETINE HYDROCHLORIDE 60 MG: 60 CAPSULE, DELAYED RELEASE ORAL at 10:05

## 2021-05-04 RX ADMIN — APIXABAN 5 MG: 5 TABLET, FILM COATED ORAL at 08:26

## 2021-05-04 ASSESSMENT — PAIN SCALES - GENERAL
PAINLEVEL_OUTOF10: 1
PAINLEVEL_OUTOF10: 0
PAINLEVEL_OUTOF10: 4

## 2021-05-04 ASSESSMENT — PAIN DESCRIPTION - ORIENTATION: ORIENTATION: RIGHT

## 2021-05-04 ASSESSMENT — PAIN DESCRIPTION - LOCATION: LOCATION: ARM

## 2021-05-04 NOTE — PROGRESS NOTES
Patient refusing labs at this time. Explanation of the reason for labs was given and patient stated he didn't care and didn't want to be poked.

## 2021-05-04 NOTE — PLAN OF CARE
Problem: Falls - Risk of:  Goal: Will remain free from falls  Description: Will remain free from falls  5/4/2021 0349 by Hollie Regalado RN  Outcome: Ongoing  Note: Patient remained free from falls. Call light within reach. Problem: Skin Integrity:  Goal: Absence of new skin breakdown  Description: Absence of new skin breakdown  5/4/2021 0349 by Hollie Regalado RN  Outcome: Ongoing  Note: No new alterations in skin integrity. Problem: Pain:  Goal: Pain level will decrease  Description: Pain level will decrease  5/4/2021 0349 by Hollie Regalado RN  Outcome: Ongoing  Note: Patient given pain medication as ordered.
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
## 2021-05-04 NOTE — CONSULTS
medications, social history, and family history, and I haveupdated the database accordingly. Allergies:   Patient has no known allergies. Review of Systems:     Review of Systems  As per history of present illness, other than above 12 system review was negative  Physical Examination :       Physical Exam  Constitutional:       General: He is not in acute distress. HENT:      Head: Normocephalic and atraumatic. Right Ear: External ear normal.      Left Ear: External ear normal.   Eyes:      General:         Left eye: No discharge. Conjunctiva/sclera: Conjunctivae normal.   Cardiovascular:      Rate and Rhythm: Normal rate and regular rhythm. Heart sounds: No murmur. Pulmonary:      Effort: Pulmonary effort is normal. No respiratory distress. Breath sounds: No wheezing. Abdominal:      General: There is no distension. Palpations: Abdomen is soft. Tenderness: There is no abdominal tenderness. Comments: Suprapubic catheter in place   Skin:     General: Skin is warm. Coloration: Skin is not jaundiced. Comments: Stage IV sacral decubitus ulcer with surrounding erythema, no fluctuation, no crepitance  Multiple wounds to the feet and lateral ankles  Left lateral hip wound   Neurological:      Mental Status: He is alert and oriented to person, place, and time. Comments: Quadriplegia          Past Medical History:   History reviewed. No pertinent past medical history.     Past Surgical  History:     Past Surgical History:   Procedure Laterality Date    CERVICAL FUSION N/A 12/1/2020    C7, CORPECTOMY WITH LUMBAR DRAIN PLACEMENT performed by Yue Lopes DO at [facility] N/A 12/3/2020    EGD PEG TUBE PLACEMENT performed by Ambrose Bardales MD at [address] [place] [address]  02/09/2021    MOUTH HARDWARE REMOVAL - HYBRID IMF    HARDWARE REMOVAL N/A 2/9/2021    MOUTH HARDWARE REMOVAL - HYBRID IMF performed by Nguyen Ramirez tobacco: Never Used   Substance and Sexual Activity    Alcohol use: No    Drug use: Not Currently     Frequency: 1.0 times per week     Types: Marijuana     Comment: last time was late november 2020    Sexual activity: Not on file   Lifestyle    Physical activity     Days per week: Not on file     Minutes per session: Not on file    Stress: Not on file   Relationships    Social connections     Talks on phone: Not on file     Gets together: Not on file     Attends Buddhist service: Not on file     Active member of club or organization: Not on file     Attends meetings of clubs or organizations: Not on file     Relationship status: Not on file    Intimate partner violence     Fear of current or ex partner: Not on file     Emotionally abused: Not on file     Physically abused: Not on file     Forced sexual activity: Not on file   Other Topics Concern    Not on file   Social History Narrative    ** Merged History Encounter **         Lives with dad, sister, and grandma. In 9th grade. Family History:     Family History   Problem Relation Age of Onset    Asthma Paternal Uncle     High Blood Pressure Paternal Grandmother       Medical Decision Making:   I have independently reviewed/ordered the following labs:    CBC with Differential:   Recent Labs     05/02/21 2250 05/04/21  1230   WBC 17.0* 9.3   HGB 12.1* 10.3*   HCT 37.1* 32.2*   * 476*   LYMPHOPCT 14*  --    MONOPCT 1*  --      BMP:  Recent Labs     05/02/21 2250 05/04/21  1230    136   K 3.4* 3.6*   CL 98 102   CO2 28 26   BUN 6 2*   CREATININE <0.40* <0.40*   MG 1.9  --      Hepatic Function Panel:   Recent Labs     05/02/21  2250   PROT 7.5   LABALBU 3.0*   BILITOT 0.34   ALKPHOS 120   ALT 7   AST 14     No results for input(s): RPR in the last 72 hours. No results for input(s): HIV in the last 72 hours. No results for input(s): BC in the last 72 hours.   Lab Results   Component Value Date    CREATININE <0.40 05/04/2021    GLUCOSE 95 05/04/2021       Detailed results: Thank you for allowing us to participate in the care of this patient. Please call with questions. This note is created with the assistance of a speech recognition program.  While intending to generate adocument that actually reflects the content of the visit, the document can still have some errors including those of syntax and sound a like substitutions which may escape proof reading. It such instances, actual meaningcan be extrapolated by contextual diversion. Estee Bender MD   Attending Physician Statement  I have discussed the care of the patient, including pertinent history and exam findings,  with the resident. I have reviewed the key elements of all parts of the encounter with the resident. I agree with the assessment, plan and orders as documented by the resident.     Chadwick Frias    Office: [phone]  Perfect serve / office [phone]

## 2021-05-04 NOTE — PROGRESS NOTES
Physician Progress Note      PATIENT:               Mandi Carmona  CSN #:                  [id number]  :                       2000  ADMIT DATE:       2021 10:15 PM  [address] [place] DATE:  RESPONDING  PROVIDER #:        Sidra Ram MD          QUERY TEXT:    Pt admitted with Sepsis . Noted documentation of Sepsis by Dr Keli Woodward. If   possible, please document in progress notes and discharge summary:      The medical record reflects the following:  Risk Factors: Paraplegia, multiple wounds  Clinical Indicators: Dr Keli Woodward documenting sepsis, lactic 1.4, low grade fever,   HR tachy  Treatment: IV ATB's, lab monitoring, ID consult, wound care    Thank You! Natasha Maria RN  RN Clinical   (Z) [phone] (t) [phone]  Options provided:  -- Sepsis confirmed present on admission  -- Sepsis  ruled out  -- Other - I will add my own diagnosis  -- Disagree - Not applicable / Not valid  -- Disagree - Clinically unable to determine / Unknown  -- Refer to Clinical Documentation Reviewer    PROVIDER RESPONSE TEXT:    The diagnosis of Sepsis was confirmed as present on admission. Query created by: Lázaro Olson on 2021 12:38 PM      QUERY TEXT:    Pt admitted with UTI. Pt noted to have chronic suprapubic cath. If possible,   please document in the progress notes and discharge summary if you are   evaluating and/or treating any of the following: The medical record reflects the following:  Risk Factors: paraplegia  Clinical Indicators: documented UTI, pt has chronic suprapubic cath  Treatment: possible cath change, lab monitoring, IV ATB's, ID consulted    Thank You!   Natasha Maria RN  RN Clinical   (P) [phone] (j) [phone]  Options provided:  -- UTI due to suprapubic catheter  -- UTI not due to suprapubic catheter  -- Other - I will add my own diagnosis  -- Disagree - Not applicable / Not valid  -- Disagree - Clinically unable to determine /

## 2021-05-04 NOTE — PROGRESS NOTES
Multiplanar reformatted images are provided for review. Dose modulation, iterative reconstruction, and/or weight based adjustment of the mA/kV was utilized to reduce the radiation dose to as low as reasonably achievable. COMPARISON: CT abdomen and pelvis without contrast March 20, 2021. HISTORY: ORDERING SYSTEM PROVIDED HISTORY: decubitus, sepsis TECHNOLOGIST PROVIDED HISTORY: decubitus, sepsis Decision Support Exception - unselect if not a suspected or confirmed emergency medical condition->Emergency Medical Condition (MA) Reason for Exam: Decubitus ulcer, sepsis Acuity: Acute Type of Exam: Initial FINDINGS: Abdomen/Pelvis: Lower chest: The lung bases are well aerated. Pleural surfaces are unremarkable and no evidence of pleural effusion is identified. Organs: The liver, gallbladder, spleen, pancreas, adrenal glands, kidneys, are unremarkable in appearance. GI/Bowel: The stomach is unremarkable without wall thickening or distention. Bowel loops are unremarkable in appearance without evidence of obstruction, distension or mucosal thickening. Pelvis: Suprapubic Serrano catheter is noted in place with mild distention of the urinary bladder. Dependent density is seen within the urinary bladder lumen suggestive of small calculi. No evidence of pelvic free fluid is seen. Peritoneum/Retroperitoneum: No evidence of retroperitoneal or intraperitoneal lymphadenopathy is identified. No evidence of intraperitoneal free fluid is seen. Bones/Soft Tissues: Left gluteal subcutaneous fat stranding is present. Persisting decubitus ulceration is seen overlying the coccyx posteriorly. The bones, skeletal muscle bundles, fascial planes and subcutaneous soft tissues are unremarkable in appearance. 1. Left gluteal fat stranding suggesting presence of cellulitis. 2. No evidence of organized soft tissue abscess.  3. Suspected multifocal small dependent calculi/gravel within the urinary bladder lumen in setting of suprapubic Serrano catheter. 4. Unchanged appearance of decubitus ulceration at the posterior margin of the coccyx. Xr Chest Portable    Result Date: 5/2/2021  EXAMINATION: ONE XRAY VIEW OF THE CHEST 5/2/2021 10:41 pm COMPARISON: 03/19/2021 HISTORY: ORDERING SYSTEM PROVIDED HISTORY: tachycardia TECHNOLOGIST PROVIDED HISTORY: tachycardia Reason for Exam: tachycardia   pt refused to lower has hands any further Acuity: Acute Type of Exam: Initial Additional signs and symptoms: tachycardia   pt refused to lower has hands any further Relevant Medical/Surgical History: tachycardia   pt refused to lower has hands any further FINDINGS: Chest radiograph: The cardiomediastinal silhouette and hilar contours are normal. The lungs are clear with no focal consolidation, pleural effusion or pneumothorax. The overlying soft tissue and osseous structures do not demonstrate acute abnormality. No acute intrathoracic pathology. HPI:   See history in H and P      Review of Systems:     Constitutional:  negative for chills, fevers, sweats  Respiratory:  negative for cough, dyspnea on exertion, hemoptysis, shortness of breath, wheezing  Cardiovascular:  negative for chest pain, chest pressure/discomfort, lower extremity edema, palpitations  Gastrointestinal:  negative for abdominal pain, constipation, diarrhea, nausea, vomiting  Neurological: Patient is paraplegic, complaining of generalized body ache especially in right arm  Data:     Past Medical History:  no change     Social History:  no change    Family History: @no change    Vitals:      I/O (24Hr):     Intake/Output Summary (Last 24 hours) at 5/4/2021 0938  Last data filed at 5/4/2021 0756  Gross per 24 hour   Intake 1875 ml   Output 2250 ml   Net -375 ml       Labs:    URINE ANALYSIS: No results found for: LABURIN     CBC:  Lab Results   Component Value Date    WBC 17.0 05/02/2021    HGB 12.1 05/02/2021     05/02/2021        BMP:    Lab Results   Component Value Date    NA 136 2021    K 3.4 2021    CL 98 2021    CO2 28 2021    BUN 6 2021    CREATININE <0.40 2021    GLUCOSE 159 2021      LIVER PROFILE:  Lab Results   Component Value Date    ALT 7 2021    AST 14 2021    PROT 7.5 2021    BILITOT 0.34 2021    LABALBU 3.0 2021               Radiology:  Medications: Allergies:      Current Meds:   Scheduled Meds:    cefepime  2,000 mg Intravenous Q8H    apixaban  5 mg Oral BID    gabapentin  300 mg Oral TID    sodium chloride flush  5-40 mL Intravenous 2 times per day    vancomycin (VANCOCIN) 1250 mg in D5W 250 mL IVPB (ADDAVIAL)  1,000 mg Intravenous Q12H    vitamin D3  400 Units Oral Daily    docusate sodium  100 mg Oral TID    DULoxetine  60 mg Oral Daily    famotidine  20 mg Oral Daily    melatonin  3 mg Oral Nightly    zonisamide  100 mg Oral Daily    fluticasone  1 spray Each Nostril Daily    vancomycin (VANCOCIN) intermittent dosing (placeholder)   Other RX Placeholder     Continuous Infusions:    sodium chloride 125 mL/hr at 21 0827    sodium chloride       PRN Meds: sodium chloride flush, sodium chloride, potassium chloride **OR** potassium alternative oral replacement **OR** potassium chloride, magnesium sulfate, promethazine **OR** ondansetron, polyethylene glycol, nicotine, acetaminophen **OR** acetaminophen, albuterol, LORazepam, oxyCODONE-acetaminophen **AND** [DISCONTINUED] oxyCODONE, sodium chloride flush      Physical Examination:        /79   Pulse 94   Temp 98.8 °F (37.1 °C) (Oral)   Resp 18   Ht 5' 5\" (1.651 m)   Wt 150 lb 5.7 oz (68.2 kg)   SpO2 99%   BMI 25.02 kg/m²   Temp (24hrs), Av.9 °F (37.2 °C), Min:98.6 °F (37 °C), Max:99.1 °F (37.3 °C)    No results for input(s): POCGLU in the last 72 hours.     Intake/Output Summary (Last 24 hours) at 2021 0938  Last data filed at 2021 0756  Gross per 24 hour   Intake 1875 ml   Output 2250 ml   Net -375 ml General Appearance:  alert, well appearing, and in no acute distress, very thin built malnourished young man  Mental status: oriented to person, place, and time with normal affect  Head:  normocephalic, atraumatic. Eye: no icterus, redness, pupils equal and reactive, extraocular eye movements intact, conjunctiva clear  Ear: normal external ear, no discharge, hearing intact  Nose:  no drainage noted  Mouth: mucous membranes moist  Neck: supple, no carotid bruits, thyroid not palpable  Lungs: Bilateral equal air entry, clear to ausculation, no wheezing, rales or rhonchi, normal effort  Cardiovascular: normal rate, regular rhythm, no murmur, gallop, rub. 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  Extremities:  peripheral pulses palpable, no pedal edema or calf pain with palpation  Psych:       Assessment:        Primary Problem  Cellulitis    Active Hospital Problems    Diagnosis Date Noted    Cellulitis [L03.90] 05/03/2021    History of DVT (deep vein thrombosis) [Z86.718] 05/03/2021    Chronic anticoagulation [Z79.01] 05/03/2021    Chronic suprapubic catheter ([facility] [address] [Z93.59] 03/20/2021    Pressure injury of coccygeal region, stage 3 [facility]) [L89.153] 02/23/2021   Principal Problem:    Cellulitis  Active Problems:    Pressure injury of coccygeal region, stage 3 ([facility])    Chronic suprapubic catheter ([facility])    History of DVT (deep vein thrombosis)    Chronic anticoagulation    MRSA bacteremia  Resolved Problems:    * No resolved hospital problems.  *      Plan:        Stage IV coccyx ulcer, MRSA bacteremia, patient is on vancomycin and cefepime, general surgery consulted will need debridement, ID following  MRSA bacteremia already on vancomycin, ordering echocardiogram  Complete paraplegia, has multiple pressure sores, wound care following  Purulent secretion around PEG tube site, concern for possible UTI, patient was admitted in [facility] in month of March with UTI, urine culture is pending, patient is on broad-spectrum antibiotic  Patient has neuropathic pain, started Cymbalta, unfortunately patient is intermittently refusing his medication, blood draw  Evaluated by urologist, likely will need catheter exchange  Patient has history of DVT, on Leana Lopez MD  5/4/2021  9:38 AM

## 2021-05-04 NOTE — PROGRESS NOTES
[address]   OCCUPATIONAL THERAPY MISSED TREATMENT NOTE   INPATIENT   Date: 21  Patient Name: Dhaval López       Room:   MRN: [id number]   Account #: [de-identified]    : 2000  (23 y.o.)  Gender: male                 REASON FOR MISSED TREATMENT:  Patient refusal   -   Other - Attempted OT Eval at [phone], patient requesting repositioning of LUE due to pain. Declines OT evaluation this date, reports \"I've got a lot going on\" and declines to be checked on in the afternoon. States he is \"picked up\" to get from bed<>chair at home. Will continue to follow for needs as appropriate.         Alesia Sutton, OT

## 2021-05-04 NOTE — PROGRESS NOTES
Pharmacy Vancomycin Consult     Vancomycin Day: 3  Current Dosin mg every 12 hours  Current indication: coccyx ulcer and cellulitis, sepsis    Temp max:  99.1    Recent Labs     21  2250 21  1230   BUN 6 2*   CREATININE <0.40* <0.40*   WBC 17.0* 9.3       Intake/Output Summary (Last 24 hours) at 2021 1419  Last data filed at 2021 1126  Gross per 24 hour   Intake 2025 ml   Output 3150 ml   Net -1125 ml     Culture Date      Source                       Results  See micro    Ht Readings from Last 1 Encounters:   21 5' 5\" (1.651 m)        Wt Readings from Last 1 Encounters:   21 150 lb 5.7 oz (68.2 kg)       Body mass index is 25.02 kg/m². CrCl cannot be calculated (This lab value cannot be used to calculate CrCl because it is not a number: <0.40).     Trough: 4.1 @ 1230 (true trough)    Assessment/Plan:  Increase dose to 1000 mg every 8 hours  Draw repeat trough prior to fourth dose  Renal function stable    Crystal Odom, PharmD, BCSCP  2021   2:22 PM

## 2021-05-04 NOTE — CARE COORDINATION
ONGOING DISCHARGE PLAN:    Patient is alert and oriented x4. Spoke with patient regarding discharge plan and patient confirms that plan is still home with [facility]. Discussed with patient that LSW received a phone call from his family yesterday stating it is not the best idea for him to come home and that he should consider SNF. He would like to speak with LSW to find out who exactly said this. Writer encouraged pt to think about SNF as he might need IV atb's on discharge. He states he will think about it. Active order for IV Cefepime and IV Vanco.    Refused to work with PT/OT. 2D echo ordered for today. Will continue to follow for additional discharge needs.     Electronically signed by Toribio Holter, RN on 5/4/2021 at 1:28 PM

## 2021-05-04 NOTE — PROGRESS NOTES
DATE: 2021    NAME: Irma Felton  MRN: [id number]   : 2000      Patient not seen this date for Physical Therapy due to:    Patient Declined: Pt declines PT this afternoon. States he has been vomiting and has not gotten any sleep. Pt states he may attempt PT tomorrow if he's feeling better.        Electronically signed by Catrachito Ye PT on 2021 at 2:00 PM

## 2021-05-04 NOTE — FLOWSHEET NOTE
05/04/21 0516   Provider Notification   Reason for Communication Review case   Provider Name Maryellen Awad   Provider Notification Advance Practice Clinician (CNS, NP, CNM, CRNA, PA)   Method of Communication Secure Message   RN updated Maryellen Awad about patient having positive blood cultures.

## 2021-05-05 ENCOUNTER — ANESTHESIA EVENT (OUTPATIENT)
Dept: OPERATING ROOM | Age: 21
DRG: 950 | End: 2021-05-05
Payer: MEDICAID

## 2021-05-05 ENCOUNTER — ANESTHESIA (OUTPATIENT)
Dept: OPERATING ROOM | Age: 21
DRG: 950 | End: 2021-05-05
Payer: MEDICAID

## 2021-05-05 VITALS
RESPIRATION RATE: 21 BRPM | OXYGEN SATURATION: 100 % | SYSTOLIC BLOOD PRESSURE: 135 MMHG | DIASTOLIC BLOOD PRESSURE: 71 MMHG

## 2021-05-05 LAB
-: NORMAL
ANION GAP SERPL CALCULATED.3IONS-SCNC: 11 MMOL/L (ref 9–17)
BUN BLDV-MCNC: 2 MG/DL (ref 6–20)
BUN/CREAT BLD: ABNORMAL (ref 9–20)
CALCIUM SERPL-MCNC: 8.8 MG/DL (ref 8.6–10.4)
CHLORIDE BLD-SCNC: 99 MMOL/L (ref 98–107)
CO2: 24 MMOL/L (ref 20–31)
CREAT SERPL-MCNC: <0.4 MG/DL (ref 0.7–1.2)
GFR AFRICAN AMERICAN: ABNORMAL ML/MIN
GFR NON-AFRICAN AMERICAN: ABNORMAL ML/MIN
GFR SERPL CREATININE-BSD FRML MDRD: ABNORMAL ML/MIN/{1.73_M2}
GFR SERPL CREATININE-BSD FRML MDRD: ABNORMAL ML/MIN/{1.73_M2}
GLUCOSE BLD-MCNC: 88 MG/DL (ref 70–99)
MAGNESIUM: 1.8 MG/DL (ref 1.6–2.6)
POTASSIUM SERPL-SCNC: 3.5 MMOL/L (ref 3.7–5.3)
REASON FOR REJECTION: NORMAL
SODIUM BLD-SCNC: 134 MMOL/L (ref 135–144)
ZZ NTE CLEAN UP: ORDERED TEST: NORMAL
ZZ NTE WITH NAME CLEAN UP: SPECIMEN SOURCE: NORMAL

## 2021-05-05 PROCEDURE — 2709999900 HC NON-CHARGEABLE SUPPLY: Performed by: SURGERY

## 2021-05-05 PROCEDURE — 3600000012 HC SURGERY LEVEL 2 ADDTL 15MIN: Performed by: SURGERY

## 2021-05-05 PROCEDURE — 3700000000 HC ANESTHESIA ATTENDED CARE: Performed by: SURGERY

## 2021-05-05 PROCEDURE — 6360000002 HC RX W HCPCS: Performed by: INTERNAL MEDICINE

## 2021-05-05 PROCEDURE — 3700000001 HC ADD 15 MINUTES (ANESTHESIA): Performed by: SURGERY

## 2021-05-05 PROCEDURE — 6360000002 HC RX W HCPCS: Performed by: NURSE PRACTITIONER

## 2021-05-05 PROCEDURE — 99232 SBSQ HOSP IP/OBS MODERATE 35: CPT | Performed by: INTERNAL MEDICINE

## 2021-05-05 PROCEDURE — 2060000000 HC ICU INTERMEDIATE R&B

## 2021-05-05 PROCEDURE — 6360000002 HC RX W HCPCS: Performed by: SURGERY

## 2021-05-05 PROCEDURE — 6370000000 HC RX 637 (ALT 250 FOR IP): Performed by: SURGERY

## 2021-05-05 PROCEDURE — 7100000000 HC PACU RECOVERY - FIRST 15 MIN: Performed by: SURGERY

## 2021-05-05 PROCEDURE — 83735 ASSAY OF MAGNESIUM: CPT

## 2021-05-05 PROCEDURE — 2580000003 HC RX 258: Performed by: SURGERY

## 2021-05-05 PROCEDURE — 80048 BASIC METABOLIC PNL TOTAL CA: CPT

## 2021-05-05 PROCEDURE — 0JB70ZZ EXCISION OF BACK SUBCUTANEOUS TISSUE AND FASCIA, OPEN APPROACH: ICD-10-PCS | Performed by: SURGERY

## 2021-05-05 PROCEDURE — 2580000003 HC RX 258: Performed by: NURSE PRACTITIONER

## 2021-05-05 PROCEDURE — 36415 COLL VENOUS BLD VENIPUNCTURE: CPT

## 2021-05-05 PROCEDURE — 3600000002 HC SURGERY LEVEL 2 BASE: Performed by: SURGERY

## 2021-05-05 PROCEDURE — 2500000003 HC RX 250 WO HCPCS: Performed by: SPECIALIST

## 2021-05-05 PROCEDURE — 2580000003 HC RX 258: Performed by: INTERNAL MEDICINE

## 2021-05-05 PROCEDURE — 2580000003 HC RX 258: Performed by: SPECIALIST

## 2021-05-05 PROCEDURE — 7100000001 HC PACU RECOVERY - ADDTL 15 MIN: Performed by: SURGERY

## 2021-05-05 PROCEDURE — 6360000002 HC RX W HCPCS: Performed by: SPECIALIST

## 2021-05-05 RX ORDER — DIPHENHYDRAMINE HYDROCHLORIDE 50 MG/ML
12.5 INJECTION INTRAMUSCULAR; INTRAVENOUS
Status: DISCONTINUED | OUTPATIENT
Start: 2021-05-05 | End: 2021-05-05 | Stop reason: HOSPADM

## 2021-05-05 RX ORDER — SODIUM CHLORIDE 9 MG/ML
INJECTION, SOLUTION INTRAVENOUS CONTINUOUS PRN
Status: DISCONTINUED | OUTPATIENT
Start: 2021-05-05 | End: 2021-05-05 | Stop reason: SDUPTHER

## 2021-05-05 RX ORDER — KETAMINE HYDROCHLORIDE 10 MG/ML
INJECTION, SOLUTION INTRAMUSCULAR; INTRAVENOUS PRN
Status: DISCONTINUED | OUTPATIENT
Start: 2021-05-05 | End: 2021-05-05 | Stop reason: SDUPTHER

## 2021-05-05 RX ORDER — OXYCODONE HYDROCHLORIDE AND ACETAMINOPHEN 5; 325 MG/1; MG/1
2 TABLET ORAL PRN
Status: DISCONTINUED | OUTPATIENT
Start: 2021-05-05 | End: 2021-05-05 | Stop reason: HOSPADM

## 2021-05-05 RX ORDER — LABETALOL HYDROCHLORIDE 5 MG/ML
5 INJECTION, SOLUTION INTRAVENOUS EVERY 10 MIN PRN
Status: DISCONTINUED | OUTPATIENT
Start: 2021-05-05 | End: 2021-05-05 | Stop reason: HOSPADM

## 2021-05-05 RX ORDER — ONDANSETRON 2 MG/ML
4 INJECTION INTRAMUSCULAR; INTRAVENOUS
Status: DISCONTINUED | OUTPATIENT
Start: 2021-05-05 | End: 2021-05-05 | Stop reason: HOSPADM

## 2021-05-05 RX ORDER — MIDAZOLAM HYDROCHLORIDE 1 MG/ML
INJECTION INTRAMUSCULAR; INTRAVENOUS PRN
Status: DISCONTINUED | OUTPATIENT
Start: 2021-05-05 | End: 2021-05-05 | Stop reason: SDUPTHER

## 2021-05-05 RX ORDER — OXYCODONE HYDROCHLORIDE AND ACETAMINOPHEN 5; 325 MG/1; MG/1
1 TABLET ORAL PRN
Status: DISCONTINUED | OUTPATIENT
Start: 2021-05-05 | End: 2021-05-05 | Stop reason: HOSPADM

## 2021-05-05 RX ORDER — PROPOFOL 10 MG/ML
INJECTION, EMULSION INTRAVENOUS PRN
Status: DISCONTINUED | OUTPATIENT
Start: 2021-05-05 | End: 2021-05-05 | Stop reason: SDUPTHER

## 2021-05-05 RX ADMIN — KETAMINE HYDROCHLORIDE 15 MG: 10 INJECTION INTRAMUSCULAR; INTRAVENOUS at 13:16

## 2021-05-05 RX ADMIN — SODIUM CHLORIDE: 9 INJECTION, SOLUTION INTRAVENOUS at 09:14

## 2021-05-05 RX ADMIN — PROPOFOL 30 MG: 10 INJECTION, EMULSION INTRAVENOUS at 13:20

## 2021-05-05 RX ADMIN — SODIUM CHLORIDE: 9 INJECTION, SOLUTION INTRAVENOUS at 13:08

## 2021-05-05 RX ADMIN — CEFEPIME 2000 MG: 2 INJECTION, POWDER, FOR SOLUTION INTRAVENOUS at 09:14

## 2021-05-05 RX ADMIN — PROPOFOL 40 MG: 10 INJECTION, EMULSION INTRAVENOUS at 13:16

## 2021-05-05 RX ADMIN — OXYCODONE HYDROCHLORIDE AND ACETAMINOPHEN 1 TABLET: 5; 325 TABLET ORAL at 21:31

## 2021-05-05 RX ADMIN — SODIUM CHLORIDE: 9 INJECTION, SOLUTION INTRAVENOUS at 03:17

## 2021-05-05 RX ADMIN — SODIUM CHLORIDE, PRESERVATIVE FREE 10 ML: 5 INJECTION INTRAVENOUS at 03:18

## 2021-05-05 RX ADMIN — ONDANSETRON 4 MG: 2 INJECTION INTRAMUSCULAR; INTRAVENOUS at 22:24

## 2021-05-05 RX ADMIN — PROPOFOL 40 MG: 10 INJECTION, EMULSION INTRAVENOUS at 13:13

## 2021-05-05 RX ADMIN — SODIUM CHLORIDE, PRESERVATIVE FREE 10 ML: 5 INJECTION INTRAVENOUS at 21:33

## 2021-05-05 RX ADMIN — CEFEPIME 2000 MG: 2 INJECTION, POWDER, FOR SOLUTION INTRAVENOUS at 00:42

## 2021-05-05 RX ADMIN — VANCOMYCIN HYDROCHLORIDE 1000 MG: 1 INJECTION, POWDER, LYOPHILIZED, FOR SOLUTION INTRAVENOUS at 01:29

## 2021-05-05 RX ADMIN — VANCOMYCIN HYDROCHLORIDE 1000 MG: 1 INJECTION, POWDER, LYOPHILIZED, FOR SOLUTION INTRAVENOUS at 10:04

## 2021-05-05 RX ADMIN — CEFEPIME 2000 MG: 2 INJECTION, POWDER, FOR SOLUTION INTRAVENOUS at 17:21

## 2021-05-05 RX ADMIN — GABAPENTIN 300 MG: 300 CAPSULE ORAL at 21:31

## 2021-05-05 RX ADMIN — MIDAZOLAM 2 MG: 1 INJECTION INTRAMUSCULAR; INTRAVENOUS at 13:10

## 2021-05-05 RX ADMIN — APIXABAN 5 MG: 5 TABLET, FILM COATED ORAL at 21:31

## 2021-05-05 RX ADMIN — POTASSIUM CHLORIDE 40 MEQ: 1500 TABLET, EXTENDED RELEASE ORAL at 17:21

## 2021-05-05 RX ADMIN — VANCOMYCIN HYDROCHLORIDE 1000 MG: 1 INJECTION, POWDER, LYOPHILIZED, FOR SOLUTION INTRAVENOUS at 18:31

## 2021-05-05 RX ADMIN — SODIUM CHLORIDE: 9 INJECTION, SOLUTION INTRAVENOUS at 14:42

## 2021-05-05 RX ADMIN — SODIUM CHLORIDE: 9 INJECTION, SOLUTION INTRAVENOUS at 12:30

## 2021-05-05 ASSESSMENT — PULMONARY FUNCTION TESTS
PIF_VALUE: 1

## 2021-05-05 ASSESSMENT — PAIN SCALES - GENERAL
PAINLEVEL_OUTOF10: 0

## 2021-05-05 NOTE — PROGRESS NOTES
Physical Therapy    DATE: 2021    NAME: Hudson Chopra  MRN: [id number]   : 2000      Patient not seen this date for Physical Therapy due to:    2021 at 36- PT evaluation deferred as pt going to surgery for debridement of decubitus  wounds.         Electronically signed by Luann Rosario PT on 2021 at 10:20 AM

## 2021-05-05 NOTE — PLAN OF CARE
Problem: Falls - Risk of:  Goal: Will remain free from falls  Description: Will remain free from falls  5/5/2021 1551 by Hernando Esteban RN  Outcome: Ongoing  Note: No falls this shift. Call light within reach and siderails x2. Bed in lowest position. Patient safety maintained. Problem: Falls - Risk of:  Goal: Absence of physical injury  Description: Absence of physical injury  Outcome: Ongoing  Note: No falls this shift. Call light within reach and siderails x2. Bed in lowest position. Patient safety maintained. Problem: Skin Integrity:  Goal: Will show no infection signs and symptoms  Description: Will show no infection signs and symptoms  Outcome: Ongoing     Problem: Skin Integrity:  Goal: Absence of new skin breakdown  Description: Absence of new skin breakdown  5/5/2021 1551 by Hernando Esteban RN  Outcome: Ongoing  Note: Skin assessment as charted. No new areas of breakdown.        Problem: Pain:  Goal: Pain level will decrease  Description: Pain level will decrease  5/5/2021 1551 by Hernando Esteban RN  Outcome: Ongoing     Problem: Pain:  Goal: Control of acute pain  Description: Control of acute pain  Outcome: Ongoing     Problem: Pain:  Goal: Control of chronic pain  Description: Control of chronic pain  Outcome: Ongoing     Problem: Nutrition  Goal: Optimal nutrition therapy  Description: Nutrition Problem #1: Severe malnutrition  Intervention: Food and/or Nutrient Delivery: Continue Current Diet, Start Oral Nutrition Supplement  Nutritional Goals: po intake greater than 75%     Outcome: Ongoing
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
## 2021-05-05 NOTE — PROGRESS NOTES
Comprehensive Nutrition Assessment    Type and Reason for Visit:  Reassess    Nutrition Recommendations/Plan: Will continue General diet and restart Ensure Enlive supplements all trays and try Magic cup supplements twice daily    Nutrition Assessment:  Pt was npo for this morning for surgical debridement. Per nursing pt has varying intake at meal times and refuses meals occasionally. Malnutrition Assessment:  Malnutrition Status:  Severe malnutrition    Context:  Chronic Illness     Findings of the 6 clinical characteristics of malnutrition:  Energy Intake:  7 - 75% or less estimated energy requirements for 1 month or longer  Weight Loss:  7 - 10% over 6 months     Body Fat Loss:  7 - Severe body fat loss Buccal region, Triceps, Orbital   Muscle Mass Loss:  7 - Severe muscle mass loss Scapula (trapezius), Hand (interosseous), Temples (temporalis)  Fluid Accumulation:  1 - Mild Extremities   Strength:  Not Performed    Estimated Daily Nutrient Needs:  Energy (kcal):  32 kcal/kg= 2000 kcal; Weight Used for Energy Requirements:  Current(61.5 kg)     Protein (g):  2g/kg= 120 g; Weight Used for Protein Requirements:  Ideal          Nutrition Related Findings:  mild edema BLE, Labs/Meds:Reviewed, pt is quadriplegic but can move his left arm a little      Wounds:  Multiple, Stage IV, Pressure Injury, Unstageable, Deep Tissue Injury       Current Nutrition Therapies:    DIET GENERAL;     Anthropometric Measures:  · Height: 5' 5\" (165.1 cm)  · Current Body Weight: 150 lb (68 kg)   · Admission Body Weight: 140 lb (63.5 kg)    · Usual Body Weight: 158 lb (71.7 kg)(12/20)     Ideal Body Weight: 136 lbs;     Nutrition Diagnosis:   · Severe malnutrition related to inadequate protein-energy intake as evidenced by severe muscle loss, severe loss of subcutaneous fat, weight loss greater than or equal to 10% in 6 months    Nutrition Interventions:   Food and/or Nutrient Delivery:  Continue Current Diet, Start Oral Nutrition Supplement  Nutrition Education/Counseling:  No recommendation at this time   Coordination of Nutrition Care:  Continue to monitor while inpatient    Goals:  po intake greater than 75%       Nutrition Monitoring and Evaluation:   Food/Nutrient Intake Outcomes:  Food and Nutrient Intake, Supplement Intake  Physical Signs/Symptoms Outcomes:  Biochemical Data, GI Status, Skin, Weight, Fluid Status or Edema     Discharge Planning:    Continue current diet     Electronically signed by Mariposa Estes RD, LD on 5/5/21 at 3:17 PM EDT    Contact: [phone]
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
## 2021-05-05 NOTE — PROGRESS NOTES
GFR Comment          GFR Staging NOT REPORTED    SPECIMEN REJECTION    Collection Time: 05/05/21  5:48 AM   Result Value Ref Range    Specimen Source . BLOOD     Ordered Test CBC     Reason for Rejection       Unable to perform testing: Specimen quantity not sufficient.    - NOT REPORTED    Magnesium    Collection Time: 05/05/21  5:48 AM   Result Value Ref Range    Magnesium 1.8 1.6 - 2.6 mg/dL     No results for input(s): POCGLU in the last 72 hours. Ct Abdomen Pelvis W Iv Contrast Additional Contrast? None    Result Date: 5/3/2021  EXAMINATION: CT OF THE ABDOMEN AND PELVIS WITH CONTRAST 5/3/2021 12:43 am TECHNIQUE: CT of the abdomen and pelvis was performed with the administration of intravenous contrast. Multiplanar reformatted images are provided for review. Dose modulation, iterative reconstruction, and/or weight based adjustment of the mA/kV was utilized to reduce the radiation dose to as low as reasonably achievable. COMPARISON: CT abdomen and pelvis without contrast March 20, 2021. HISTORY: ORDERING SYSTEM PROVIDED HISTORY: decubitus, sepsis TECHNOLOGIST PROVIDED HISTORY: decubitus, sepsis Decision Support Exception - unselect if not a suspected or confirmed emergency medical condition->Emergency Medical Condition (MA) Reason for Exam: Decubitus ulcer, sepsis Acuity: Acute Type of Exam: Initial FINDINGS: Abdomen/Pelvis: Lower chest: The lung bases are well aerated. Pleural surfaces are unremarkable and no evidence of pleural effusion is identified. Organs: The liver, gallbladder, spleen, pancreas, adrenal glands, kidneys, are unremarkable in appearance. GI/Bowel: The stomach is unremarkable without wall thickening or distention. Bowel loops are unremarkable in appearance without evidence of obstruction, distension or mucosal thickening. Pelvis: Suprapubic Serrano catheter is noted in place with mild distention of the urinary bladder.   Dependent density is seen within the urinary bladder lumen suggestive of palpitations  Gastrointestinal:  negative for abdominal pain, constipation, diarrhea, nausea, vomiting  Neurological: Patient is paraplegic, complaining of generalized body ache especially in right arm  Data:     Past Medical History:  no change     Social History:  no change    Family History: @no change    Vitals:      I/O (24Hr): Intake/Output Summary (Last 24 hours) at 5/5/2021 1528  Last data filed at 5/5/2021 1339  Gross per 24 hour   Intake 3863 ml   Output 3600 ml   Net 263 ml       Labs:    URINE ANALYSIS: No results found for: LABURIN     CBC:  Lab Results   Component Value Date    WBC 9.3 05/04/2021    HGB 10.3 05/04/2021     05/04/2021        BMP:    Lab Results   Component Value Date     05/05/2021    K 3.5 05/05/2021    CL 99 05/05/2021    CO2 24 05/05/2021    BUN 2 05/05/2021    CREATININE <0.40 05/05/2021    GLUCOSE 88 05/05/2021      LIVER PROFILE:  Lab Results   Component Value Date    ALT 7 05/02/2021    AST 14 05/02/2021    PROT 7.5 05/02/2021    BILITOT 0.34 05/02/2021    LABALBU 3.0 05/02/2021               Radiology:  Medications:      Allergies:      Current Meds:   Scheduled Meds:    vancomycin (VANCOCIN) 1250 mg in D5W 250 mL IVPB (ADDAVIAL)  1,000 mg Intravenous Q8H    cefepime  2,000 mg Intravenous Q8H    apixaban  5 mg Oral BID    gabapentin  300 mg Oral TID    sodium chloride flush  5-40 mL Intravenous 2 times per day    vitamin D3  400 Units Oral Daily    docusate sodium  100 mg Oral TID    DULoxetine  60 mg Oral Daily    famotidine  20 mg Oral Daily    melatonin  3 mg Oral Nightly    zonisamide  100 mg Oral Daily    fluticasone  1 spray Each Nostril Daily    vancomycin (VANCOCIN) intermittent dosing (placeholder)   Other RX Placeholder     Continuous Infusions:    sodium chloride 125 mL/hr at 05/05/21 1442    sodium chloride       PRN Meds: sodium chloride flush, sodium chloride, potassium chloride **OR** potassium alternative oral replacement **OR** potassium chloride, magnesium sulfate, promethazine **OR** ondansetron, polyethylene glycol, nicotine, acetaminophen **OR** acetaminophen, albuterol, LORazepam, oxyCODONE-acetaminophen **AND** [DISCONTINUED] oxyCODONE      Physical Examination:        BP (!) 147/90   Pulse 80   Temp 97.5 °F (36.4 °C) (Oral)   Resp 16   Ht 5' 5\" (1.651 m)   Wt 150 lb 5.7 oz (68.2 kg)   SpO2 99%   BMI 25.02 kg/m²   Temp (24hrs), Av.3 °F (36.8 °C), Min:97.1 °F (36.2 °C), Max:99.7 °F (37.6 °C)    No results for input(s): POCGLU in the last 72 hours. Intake/Output Summary (Last 24 hours) at 2021 1528  Last data filed at 2021 1339  Gross per 24 hour   Intake 3863 ml   Output 3600 ml   Net 263 ml       General Appearance:  alert, well appearing, and in no acute distress, very thin built malnourished young man  Mental status: oriented to person, place, and time with normal affect  Head:  normocephalic, atraumatic. Eye: no icterus, redness, pupils equal and reactive, extraocular eye movements intact, conjunctiva clear  Ear: normal external ear, no discharge, hearing intact  Nose:  no drainage noted  Mouth: mucous membranes moist  Neck: supple, no carotid bruits, thyroid not palpable  Lungs: Bilateral equal air entry, clear to ausculation, no wheezing, rales or rhonchi, normal effort  Cardiovascular: normal rate, regular rhythm, no murmur, gallop, rub.   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 [address] [Z93.59] 03/20/2021    Pressure injury of coccygeal region, stage 3 [facility]) [L89.153] 02/23/2021   Principal Problem:    Cellulitis  Active Problems:    Pressure injury of coccygeal region, stage 3 (HCC)    Chronic suprapubic catheter ([facility])    History of DVT (deep vein thrombosis)    Chronic anticoagulation    MRSA bacteremia  Resolved Problems:    * No resolved hospital problems. *      Plan:        Stage IV coccyx ulcer, MRSA bacteremia, patient is on vancomycin and cefepime, underwent Débridement , likely will need diverting colostomy  MRSA bacteremia already on vancomycin, echo negative for vegetation  Complete paraplegia, has multiple pressure sores, wound care following  Purulent secretion around PEG tube site, concern for possible UTI, patient was admitted in [facility] in month of March with UTI, urine culture is negative .  patient is on broad-spectrum antibiotic  Patient has neuropathic pain, started Cymbalta,   Evaluated by urologist, likely will need catheter exchange  Patient has history of DVT, on Leana Fregoso MD  5/5/2021  3:28 PM

## 2021-05-05 NOTE — ANESTHESIA POSTPROCEDURE EVALUATION
POST- ANESTHESIA EVALUATION       Pt Name: Alicia Goodwin  MRN: [id number]  YOB: 2000  Date of evaluation: 5/5/2021  Time:  2:49 PM      BP (!) 147/90   Pulse 80   Temp 97.5 °F (36.4 °C) (Oral)   Resp 16   Ht 5' 5\" (1.651 m)   Wt 150 lb 5.7 oz (68.2 kg)   SpO2 99%   BMI 25.02 kg/m²      Consciousness Level  Awake  Cardiopulmonary Status  Stable  Pain Adequately Treated YES  Nausea / Vomiting  NO  Adequate Hydration  YES  Anesthesia Related Complications NONE      Electronically signed by Yolanda Chow MD on 5/5/2021 at 2:49 PM       Department of Anesthesiology  Postprocedure Note    Patient: Alicia Goodwin  MRN: [id number]  YOB: 2000  Date of evaluation: 5/5/2021  Time:  2:49 PM     Procedure Summary     Date: 05/05/21 Room / Location: [address] [place] [address]  10 / [place] [address]    Anesthesia Start: 4198 Anesthesia Stop: 8648    Procedure: EXCISIONAL DEBRIDEMENT OF SACROCOCCYGEAL ULCER (N/A Back) Diagnosis: (STAGE 3 NON HEALING ULCER)    Surgeons: Annmarie Estes MD Responsible Provider: Yolanda Chow MD    Anesthesia Type: general ASA Status: 4          Anesthesia Type: general    June Phase I: June Score: 5    June Phase II:      Last vitals: Reviewed and per EMR flowsheets.        Anesthesia Post Evaluation

## 2021-05-05 NOTE — PROGRESS NOTES
[address] [facility]    OCCUPATIONAL THERAPY MISSED TREATMENT NOTE   INPATIENT   Date: 21  Patient Name: Gavni Vicente       Room:   MRN: [id number]   Account #: [de-identified]    : 2000  (23 y.o.)  Gender: male                 REASON FOR MISSED TREATMENT:  Patient unable to participate   -   Surgery  21 - deferred as pt going to surgery for debridement of decubitus  wounds        Nakiat Alfaro, OT

## 2021-05-05 NOTE — PLAN OF CARE
Nutrition Problem #1: Severe malnutrition  Intervention: Food and/or Nutrient Delivery: Continue Current Diet, Start Oral Nutrition Supplement  Nutritional Goals: po intake greater than 75%

## 2021-05-06 PROBLEM — F33.2 MAJOR DEPRESSIVE DISORDER, RECURRENT SEVERE WITHOUT PSYCHOTIC FEATURES (HCC): Status: ACTIVE | Noted: 2021-05-06

## 2021-05-06 PROCEDURE — 6370000000 HC RX 637 (ALT 250 FOR IP): Performed by: SURGERY

## 2021-05-06 PROCEDURE — 2580000003 HC RX 258: Performed by: SURGERY

## 2021-05-06 PROCEDURE — 6360000002 HC RX W HCPCS: Performed by: SURGERY

## 2021-05-06 PROCEDURE — 51705 CHANGE OF BLADDER TUBE: CPT

## 2021-05-06 PROCEDURE — 99233 SBSQ HOSP IP/OBS HIGH 50: CPT | Performed by: INTERNAL MEDICINE

## 2021-05-06 PROCEDURE — 2060000000 HC ICU INTERMEDIATE R&B

## 2021-05-06 PROCEDURE — 90792 PSYCH DIAG EVAL W/MED SRVCS: CPT | Performed by: PSYCHIATRY & NEUROLOGY

## 2021-05-06 PROCEDURE — 99232 SBSQ HOSP IP/OBS MODERATE 35: CPT | Performed by: INTERNAL MEDICINE

## 2021-05-06 RX ADMIN — DOCUSATE SODIUM 100 MG: 100 CAPSULE, LIQUID FILLED ORAL at 08:15

## 2021-05-06 RX ADMIN — VANCOMYCIN HYDROCHLORIDE 1000 MG: 1 INJECTION, POWDER, LYOPHILIZED, FOR SOLUTION INTRAVENOUS at 11:27

## 2021-05-06 RX ADMIN — DOCUSATE SODIUM 100 MG: 100 CAPSULE, LIQUID FILLED ORAL at 22:09

## 2021-05-06 RX ADMIN — CHOLECALCIFEROL TAB 10 MCG (400 UNIT) 400 UNITS: 10 TAB at 08:15

## 2021-05-06 RX ADMIN — OXYCODONE HYDROCHLORIDE AND ACETAMINOPHEN 1 TABLET: 5; 325 TABLET ORAL at 22:29

## 2021-05-06 RX ADMIN — CEFEPIME 2000 MG: 2 INJECTION, POWDER, FOR SOLUTION INTRAVENOUS at 08:24

## 2021-05-06 RX ADMIN — POTASSIUM BICARBONATE 40 MEQ: 782 TABLET, EFFERVESCENT ORAL at 12:41

## 2021-05-06 RX ADMIN — GABAPENTIN 300 MG: 300 CAPSULE ORAL at 08:15

## 2021-05-06 RX ADMIN — CEFEPIME 2000 MG: 2 INJECTION, POWDER, FOR SOLUTION INTRAVENOUS at 01:15

## 2021-05-06 RX ADMIN — APIXABAN 5 MG: 5 TABLET, FILM COATED ORAL at 08:15

## 2021-05-06 RX ADMIN — FAMOTIDINE 20 MG: 20 TABLET ORAL at 08:15

## 2021-05-06 RX ADMIN — GABAPENTIN 300 MG: 300 CAPSULE ORAL at 22:09

## 2021-05-06 RX ADMIN — CEFEPIME 2000 MG: 2 INJECTION, POWDER, FOR SOLUTION INTRAVENOUS at 16:07

## 2021-05-06 RX ADMIN — SODIUM CHLORIDE: 9 INJECTION, SOLUTION INTRAVENOUS at 12:37

## 2021-05-06 RX ADMIN — VANCOMYCIN HYDROCHLORIDE 1000 MG: 1 INJECTION, POWDER, LYOPHILIZED, FOR SOLUTION INTRAVENOUS at 02:27

## 2021-05-06 RX ADMIN — SODIUM CHLORIDE: 9 INJECTION, SOLUTION INTRAVENOUS at 01:16

## 2021-05-06 RX ADMIN — VANCOMYCIN HYDROCHLORIDE 1000 MG: 1 INJECTION, POWDER, LYOPHILIZED, FOR SOLUTION INTRAVENOUS at 22:08

## 2021-05-06 RX ADMIN — SODIUM CHLORIDE: 9 INJECTION, SOLUTION INTRAVENOUS at 22:08

## 2021-05-06 ASSESSMENT — PAIN SCALES - GENERAL
PAINLEVEL_OUTOF10: 10
PAINLEVEL_OUTOF10: 5

## 2021-05-06 NOTE — PROGRESS NOTES
[facility] Internal Medicine    Progress Note     5/6/2021    9:51 AM    Name:   Nina Duran  MRN:     [id number]     Acct:      [de-identified]   Room:   2091/2091-01  IP Day:  3  Admit Date:  5/2/2021 10:15 PM    PCP:   Sarah Siegel MD  Code Status:  Full Code    Subjective:     C/C:   Chief Complaint   Patient presents with    Wound Check     Principal Problem:    Cellulitis  Active Problems:    Pressure injury of coccygeal region, stage 3 ([address]    Chronic suprapubic catheter ([address]    History of DVT (deep vein thrombosis)    Chronic anticoagulation    MRSA bacteremia  Resolved Problems:    * No resolved hospital problems. *      Interval History Status: not changed. Patient has history of C7 cervical fracture, complete spinal cord injury, history of DVT on anticoagulation severe protein calorie malnutrition, was admitted with coccyx wound infection, patient underwent CT abdomen pelvis, concerning for large wound in his back  5/5   Underwent debridement  Plan for loop colostomy    5/6  Patient, refusing lab draw, refusing medication  As per nursing report, has very poor oral intake  5/7   Patient underwent diversion colostomy  Feeling very cold       Significant last 24 hr data reviewed ;   Vitals:    05/05/21 1430 05/05/21 1945 05/06/21 0215 05/06/21 0745   BP: (!) 147/90 119/75 114/61 102/63   Pulse: 80 112 94 93   Resp: 16 18 18 18   Temp: 97.5 °F (36.4 °C) 98.1 °F (36.7 °C) 97.7 °F (36.5 °C) 98.8 °F (37.1 °C)   TempSrc: Oral Oral Oral Oral   SpO2: 99% 99% 94% 98%   Weight:   139 lb 15.9 oz (63.5 kg)    Height:          No results found for this or any previous visit (from the past 24 hour(s)). No results for input(s): POCGLU in the last 72 hours.      Ct Abdomen Pelvis W Iv Contrast Additional Contrast? None    Result Date: 5/3/2021  EXAMINATION: CT OF THE ABDOMEN AND PELVIS WITH CONTRAST 5/3/2021 12:43 am TECHNIQUE: CT of the abdomen and pelvis was performed with the administration of intravenous contrast. Multiplanar reformatted images are provided for review. Dose modulation, iterative reconstruction, and/or weight based adjustment of the mA/kV was utilized to reduce the radiation dose to as low as reasonably achievable. COMPARISON: CT abdomen and pelvis without contrast March 20, 2021. HISTORY: ORDERING SYSTEM PROVIDED HISTORY: decubitus, sepsis TECHNOLOGIST PROVIDED HISTORY: decubitus, sepsis Decision Support Exception - unselect if not a suspected or confirmed emergency medical condition->Emergency Medical Condition (MA) Reason for Exam: Decubitus ulcer, sepsis Acuity: Acute Type of Exam: Initial FINDINGS: Abdomen/Pelvis: Lower chest: The lung bases are well aerated. Pleural surfaces are unremarkable and no evidence of pleural effusion is identified. Organs: The liver, gallbladder, spleen, pancreas, adrenal glands, kidneys, are unremarkable in appearance. GI/Bowel: The stomach is unremarkable without wall thickening or distention. Bowel loops are unremarkable in appearance without evidence of obstruction, distension or mucosal thickening. Pelvis: Suprapubic Serrano catheter is noted in place with mild distention of the urinary bladder. Dependent density is seen within the urinary bladder lumen suggestive of small calculi. No evidence of pelvic free fluid is seen. Peritoneum/Retroperitoneum: No evidence of retroperitoneal or intraperitoneal lymphadenopathy is identified. No evidence of intraperitoneal free fluid is seen. Bones/Soft Tissues: Left gluteal subcutaneous fat stranding is present. Persisting decubitus ulceration is seen overlying the coccyx posteriorly. The bones, skeletal muscle bundles, fascial planes and subcutaneous soft tissues are unremarkable in appearance. 1. Left gluteal fat stranding suggesting presence of cellulitis. 2. No evidence of organized soft tissue abscess.  3. Suspected multifocal small dependent calculi/gravel within the urinary bladder lumen in setting of suprapubic Serrano catheter. 4. Unchanged appearance of decubitus ulceration at the posterior margin of the coccyx. Xr Chest Portable    Result Date: 5/2/2021  EXAMINATION: ONE XRAY VIEW OF THE CHEST 5/2/2021 10:41 pm COMPARISON: 03/19/2021 HISTORY: ORDERING SYSTEM PROVIDED HISTORY: tachycardia TECHNOLOGIST PROVIDED HISTORY: tachycardia Reason for Exam: tachycardia   pt refused to lower has hands any further Acuity: Acute Type of Exam: Initial Additional signs and symptoms: tachycardia   pt refused to lower has hands any further Relevant Medical/Surgical History: tachycardia   pt refused to lower has hands any further FINDINGS: Chest radiograph: The cardiomediastinal silhouette and hilar contours are normal. The lungs are clear with no focal consolidation, pleural effusion or pneumothorax. The overlying soft tissue and osseous structures do not demonstrate acute abnormality. No acute intrathoracic pathology. HPI:   See history in H and P      Review of Systems:     Constitutional:  negative for chills, fevers, sweats  Respiratory:  negative for cough, dyspnea on exertion, hemoptysis, shortness of breath, wheezing  Cardiovascular:  negative for chest pain, chest pressure/discomfort, lower extremity edema, palpitations  Gastrointestinal:  negative for abdominal pain, constipation, diarrhea, nausea, vomiting  Neurological: Patient is paraplegic, complaining of generalized body ache especially in right arm  Data:     Past Medical History:  no change     Social History:  no change    Family History: @no change    Vitals:      I/O (24Hr):     Intake/Output Summary (Last 24 hours) at 5/6/2021 0951  Last data filed at 5/5/2021 1945  Gross per 24 hour   Intake 3424 ml   Output 700 ml   Net 2724 ml       Labs:    URINE ANALYSIS: No results found for: LABURIN     CBC:  Lab Results   Component Value Date    WBC 9.3 05/04/2021    HGB 10.3 05/04/2021     05/04/2021        BMP:    Lab Results   Component Value Date     2021    K 3.5 2021    CL 99 2021    CO2 24 2021    BUN 2 2021    CREATININE <0.40 2021    GLUCOSE 88 2021      LIVER PROFILE:  Lab Results   Component Value Date    ALT 7 2021    AST 14 2021    PROT 7.5 2021    BILITOT 0.34 2021    LABALBU 3.0 2021               Radiology:  Medications: Allergies:      Current Meds:   Scheduled Meds:    vancomycin (VANCOCIN) 1250 mg in D5W 250 mL IVPB (ADDAVIAL)  1,000 mg Intravenous Q8H    cefepime  2,000 mg Intravenous Q8H    apixaban  5 mg Oral BID    gabapentin  300 mg Oral TID    sodium chloride flush  5-40 mL Intravenous 2 times per day    vitamin D3  400 Units Oral Daily    docusate sodium  100 mg Oral TID    DULoxetine  60 mg Oral Daily    famotidine  20 mg Oral Daily    melatonin  3 mg Oral Nightly    zonisamide  100 mg Oral Daily    fluticasone  1 spray Each Nostril Daily    vancomycin (VANCOCIN) intermittent dosing (placeholder)   Other RX Placeholder     Continuous Infusions:    sodium chloride 125 mL/hr at 21 0116    sodium chloride       PRN Meds: sodium chloride flush, sodium chloride, potassium chloride **OR** potassium alternative oral replacement **OR** potassium chloride, magnesium sulfate, promethazine **OR** ondansetron, polyethylene glycol, nicotine, acetaminophen **OR** acetaminophen, albuterol, LORazepam, oxyCODONE-acetaminophen **AND** [DISCONTINUED] oxyCODONE      Physical Examination:        /63   Pulse 93   Temp 98.8 °F (37.1 °C) (Oral)   Resp 18   Ht 5' 5\" (1.651 m)   Wt 139 lb 15.9 oz (63.5 kg)   SpO2 98%   BMI 23.30 kg/m²   Temp (24hrs), Av.7 °F (36.5 °C), Min:97.1 °F (36.2 °C), Max:98.8 °F (37.1 °C)    No results for input(s): POCGLU in the last 72 hours.     Intake/Output Summary (Last 24 hours) at 2021 0951  Last data filed at 2021 1945  Gross per 24 hour   Intake 3424 ml   Output 700 ml Net 2724 ml       General Appearance:  alert, well appearing, and in no acute distress, very thin built malnourished young man  Mental status: oriented to person, place, and time with normal affect  Head:  normocephalic, atraumatic. Eye: no icterus, redness, pupils equal and reactive, extraocular eye movements intact, conjunctiva clear  Ear: normal external ear, no discharge, hearing intact  Nose:  no drainage noted  Mouth: mucous membranes moist  Neck: supple, no carotid bruits, thyroid not palpable  Lungs: Bilateral equal air entry, clear to ausculation, no wheezing, rales or rhonchi, normal effort  Cardiovascular: normal rate, regular rhythm, no murmur, gallop, rub. 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 [address] [Z93.59] 03/20/2021    Pressure injury of coccygeal region, stage 3 [facility]) [L89.153] 02/23/2021   Principal Problem:    Cellulitis  Active Problems:    Pressure injury of coccygeal region, stage 3 (HCC)    Chronic suprapubic catheter ([facility])    History of DVT (deep vein thrombosis)    Chronic anticoagulation    MRSA bacteremia  Resolved Problems:    * No resolved hospital problems.  *      Plan:        Stage IV coccyx ulcer, MRSA bacteremia, patient is on vancomycin and cefepime, underwent Débridement , underwent diversion colostomy  MRSA bacteremia already on vancomycin, echo negative for vegetation  Complete paraplegia, has multiple pressure sores, wound care following  Purulent secretion around PEG tube site, concern for possible UTI, patient was admitted in [place] [address] in month of March with UTI, urine culture is negative .  patient is on broad-spectrum antibiotic  Patient has neuropathic pain, started Cymbalta,   Evaluated by urologist, likely will need catheter exchange  Patient has history of DVT, on Eliquis  Per nursing report, patient is very depressed, not suicidal, refusing labs, refusing blood work, consulting psychiatrist  Will start on diet, once okay with general surgery  Ordering TSH    Ellen Grimaldo MD  5/6/2021  9:51 AM
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
## 2021-05-06 NOTE — CONSULTS
nasal spray 1 spray by Nasal route 2 times daily as needed for Congestion       Historical Provider, MD   famotidine (PEPCID) 20 MG tablet Take 20 mg by mouth daily       Historical Provider, MD   predniSONE (DELTASONE) 20 MG tablet Take 20 mg by mouth daily       Historical Provider, MD   LORazepam (ATIVAN) 0.5 MG tablet Take 0.5 mg by mouth every 6 hours as needed for Anxiety.       Historical Provider, MD   oxyCODONE-acetaminophen (PERCOCET)  MG per tablet Take 1 tablet by mouth every 6 hours as needed for Pain.       Historical Provider, MD   ondansetron (ZOFRAN) 4 MG tablet Take 4 mg by mouth every 8 hours as needed for Nausea or Vomiting       Historical Provider, MD   acetaminophen (TYLENOL) 650 MG/20.3ML SUSP Take 15 mg/kg by mouth every 4 hours as needed for Pain       Historical Provider, MD   miconazole nitrate 2 % OINT Apply topically 2 times daily       Historical Provider, MD   Glucagon HCl, rDNA, (GLUCAGEN IJ) Inject as directed       Historical Provider, MD   ciprofloxacin (CILOXAN) 0.3 % ophthalmic solution Place 2 drops into both eyes every 2 hours 2 DROPS FOUR TIMES A DAY       Historical Provider, MD   diclofenac sodium (VOLTAREN) 1 % GEL Apply topically 2 times daily       Historical Provider, MD   DULoxetine (CYMBALTA) 60 MG extended release capsule Take 60 mg by mouth daily TAKE 2 PILLS ONCE DAILY       Historical Provider, MD   fluticasone (FLONASE) 50 MCG/ACT nasal spray 1 spray by Each Nostril route daily       Historical Provider, MD   QUEtiapine (SEROQUEL) 25 MG tablet Take 25 mg by mouth 2 times daily       Historical Provider, MD   albuterol (PROVENTIL) (2.5 MG/3ML) 0.083% nebulizer solution Take 3 mLs by nebulization every 4 hours as needed for Wheezing 12/10/20     VIKKI Yeh - CNP            Medications:      Current Hospital Medications   Current Facility-Administered Medications: vancomycin (VANCOCIN) 1,000 mg in dextrose 5 % 250 mL IVPB (ADDAVIAL), 1,000 mg, immediate release tablet 5 mg, 5 mg, Oral, Q6H PRN  vancomycin (VANCOCIN) intermittent dosing (placeholder), , Other, RX Placeholder         Past Medical History:    Past Medical History    History reviewed.  No pertinent past medical history.        Past Surgical History:    Past Surgical History             Procedure Laterality Date    CERVICAL FUSION N/A 12/1/2020     C7, CORPECTOMY WITH LUMBAR DRAIN PLACEMENT performed by Rebecca Farrell DO at [facility] N/A 12/3/2020     EGD PEG TUBE PLACEMENT performed by Lencho Yee MD at [facility]   02/09/2021     MOUTH HARDWARE REMOVAL - HYBRID IMF    HARDWARE REMOVAL N/A 2/9/2021     MOUTH HARDWARE REMOVAL - HYBRID IMF performed by Sav Mcclure MD at [facility]   12/14/2020     IR GASTROSTOMY TUBE PLACEMENT PERCUTANEOUS 12/14/2020 MD [facility]    IR GASTROSTOMY TUBE PLACEMENT PERCUTANEOUS   12/15/2020     IR GASTROSTOMY TUBE PLACEMENT PERCUTANEOUS 12/15/2020 MD [facility]    MANDIBLE FRACTURE SURGERY N/A 12/3/2020     HYBRID IMF EXTERNAL FIXATOR DEVICE MANDIBLE, SHARP EXCISIONAL DEBRIDEMENT, REPAIR OF COMPLEX WOUND RIGHT EYELID performed by Sav Mcclure MD at [facility] N/A 5/5/2021     EXCISIONAL DEBRIDEMENT OF SACROCOCCYGEAL ULCER performed by Beatrice Aldridge MD at [facility] N/A 12/3/2020     [facility]   12/1/2020     EGD ESOPHAGOGASTRODUODENOSCOPY performed by Rebecca Farrell DO at [facility]: Patient has no known allergies.       Social History:       RESIDENCE: Lives with sister, appears will be going to [facility] rehab upon discharge  : Never         CHILDREN: 0  OCCUPATION: Not presently employed  EDUCATION: High school diploma  Born and raised in [facility] 85:  Patient refused to answer questions about substance use history           Family Medical and Psychiatric History:   Psychiatric family history: Denies  Substance use: Denies  Suicides in family: Denies     Family History             Problem Relation Age of Onset    Asthma Paternal Uncle      High Blood Pressure Paternal Grandmother                 Physical  /68   Pulse 98   Temp 98.6 °F (37 °C) (Oral)   Resp 18   Ht 5' 5\" (1.651 m)   Wt 139 lb 15.9 oz (63.5 kg)   SpO2 99%   BMI 23.30 kg/m²            Mental Status Examination:  Level of consciousness:  Within normal limits  Appearance: hospital attire, lying in bed, poor grooming  Behavior/Motor: Quadriplegic  Attitude toward examiner: Uncooperative, irritable, minimal eye contact  Speech: Normal rate and volume, irritable tone  Mood: Depressed, irritable  Affect: mood congruent  Thought processes:  Linear, coherent  Thought content: Denies suicidal ideations              Denies homicidal ideations               Denies hallucinations              Denies delusions  Cognition: Patient does not answer asked orientation questions  Concentration: Distractible  Memory age appropriate  Insight & Judgment: Poor     DSM-5 DIAGNOSIS:    Mood disorder unspecified     Stressors     Severity of stressors is  moderate  Source of stressors include: Other psychosocial and environmental stressors     PLAN:    Offered him multiple antidepressant medications and other medications to help with his sleep. Patient is denying any help from us at this point. We will sign off. Please call back with any questions.     Thank you very much for allowing us to participate in the care of this patient.       Electronically signed by Ashok Gant MD on 5/6/21 at 5:01 PM EDT

## 2021-05-06 NOTE — PROGRESS NOTES
Comprehensive Nutrition Assessment    Type and Reason for Visit:  Reassess, Consult    Nutrition Recommendations/Plan: Will continue to provide General diet with Magic Cup supplements twice daily and Ensure Enlive supplements on all trays. Supplements provide 78 g protein, 1630 kcal  Consider Marrinol as an appetite stimulant for this pt? Nutrition Assessment:  Consulted for high protein diet. Pt ate 2 yogurts and sips of Ensure at breakfast this morning- which was all he ordered. Pt was assessed on 5/3 and again 5/5 and he has been receiving supplements on all trays.     Malnutrition Assessment:  Malnutrition Status:  Severe malnutrition    Context:  Chronic Illness     Findings of the 6 clinical characteristics of malnutrition:  Energy Intake:  7 - 75% or less estimated energy requirements for 1 month or longer  Weight Loss:  7 - 10% over 6 months     Body Fat Loss:  7 - Severe body fat loss Buccal region, Triceps, Orbital   Muscle Mass Loss:  7 - Severe muscle mass loss Scapula (trapezius), Hand (interosseous), Temples (temporalis)  Fluid Accumulation:  1 - Mild Extremities   Strength:  Not Performed    Estimated Daily Nutrient Needs:  Energy (kcal):  32 kcal/kg= 2000 kcal; Weight Used for Energy Requirements:  Current(61.5 kg)     Protein (g):  2g/kg= 120 g; Weight Used for Protein Requirements:  Ideal          Nutrition Related Findings:  mild edema BLE, Labs/Meds: Reviewed, pt needs help with feeding, BM 5/6      Wounds:  Multiple, Stage IV, Pressure Injury, Unstageable, Deep Tissue Injury       Current Nutrition Therapies:    DIET GENERAL;  Dietary Nutrition Supplements: Standard High Calorie Oral Supplement  Dietary Nutrition Supplements: Frozen Oral Supplement    Anthropometric Measures:  · Height: 5' 5\" (165.1 cm)  · Current Body Weight: 139 lb (63 kg)   · Admission Body Weight: 140 lb (63.5 kg)    · Usual Body Weight: 158 lb (71.7 kg)(12/20)     Ideal Body Weight: 136 lbs;     Nutrition Diagnosis:   · Severe malnutrition related to inadequate protein-energy intake as evidenced by severe muscle loss, severe loss of subcutaneous fat, weight loss greater than or equal to 10% in 6 months    Nutrition Interventions:   Food and/or Nutrient Delivery:  Continue Current Diet, Continue Oral Nutrition Supplement  Nutrition Education/Counseling:  No recommendation at this time   Coordination of Nutrition Care:  Continue to monitor while inpatient    Goals:  po intake greater than 75%       Nutrition Monitoring and Evaluation:   Food/Nutrient Intake Outcomes:  Food and Nutrient Intake, Supplement Intake  Physical Signs/Symptoms Outcomes:  Biochemical Data, Fluid Status or Edema, Weight, Skin, GI Status     Discharge Planning:    Continue current diet, Continue Oral Nutrition Supplement     Electronically signed by Herbert Cardoza RD, XIOMARA on 5/6/21 at 2:41 PM EDT    Contact: [phone]

## 2021-05-06 NOTE — PROGRESS NOTES
[facility] -   Infectious diseases evaluation  admission date 5/2/2021    reason for consultation:   Coccyx wound  Impression :   Current:  · Necrotic stage IV coccyx ulcer with surrounding cellulitis status post excisional debridement  · Possible UTI, no growth on urine culture  · Sepsis secondary to above  · 1/2 blood culture positive for staphylococcus epidermidis likely contamination  · Left lateral hip wound  · Left foot heel, dorsum and lateral ankle wounds  · Right heel and lateral ankle wounds  · Quadriplegia secondary to spinal cord injury  · Neurogenic bladder status post suprapubic catheter  · DVT on Eliquis  · History of subchronic hemorrhage    Recommendations   · IV vancomycin and cefepime  · Echocardiogram  showed no vegetations  · Urology consulted for suprapubic catheter exchange  · Follow cultures  · Follow CBC and renal function    History of Present Illness:   Initial history:  Irma Felton is a 23y.o.-year-old male quadriplegic secondary to spinal cord injury presented to the hospital with worsening chills and back pain for several days. He had multiple wounds to the coccyx, bilateral feet and ankles. He was tachycardic on admission, WBC 17  Sed rate and C-reactive protein elevated  Urinalysis showed moderate leukocyte esterase, 20-50 WBCs  CT abdomen and pelvis showed left gluteal fat stranding suggestive of cellulitis, no abscess, urinary bladder calculi, unchanged appearance of coccyx decubitus ulcer.   COVID-19 rapid test was negative  Chest x-ray no acute process    Interval changes  5/6/2021   Patient had surgical debridement for his sacral decubitus ulcer yesterday, feeling depressed today, refusing labs, feeling cold covered with multiple blankets, denied nausea or vomiting, no cough or shortness of breath, no new complaints    Patient Vitals for the past 8 hrs:   BP Temp Temp src Pulse Resp SpO2   05/06/21 1215 110/69 98.6 °F (37 °C) Oral 98 18 99 %   05/06/21 0745 102/63 98.8 °F (37.1 °C) Oral 93 18 98 %           I have personally reviewed the past medical history, past surgical history, medications, social history, and family history, and I haveupdated the database accordingly. Allergies:   Patient has no known allergies. Review of Systems:     Review of Systems  As per history of present illness, other than above 12 system review was negative  Physical Examination :       Physical Exam  Constitutional:       General: He is not in acute distress. HENT:      Head: Normocephalic and atraumatic. Right Ear: External ear normal.      Left Ear: External ear normal.   Eyes:      General:         Left eye: No discharge. Conjunctiva/sclera: Conjunctivae normal.   Cardiovascular:      Rate and Rhythm: Normal rate and regular rhythm. Heart sounds: No murmur. Pulmonary:      Effort: Pulmonary effort is normal. No respiratory distress. Breath sounds: No wheezing. Abdominal:      General: There is no distension. Palpations: Abdomen is soft. Tenderness: There is no abdominal tenderness. Comments: Suprapubic catheter in place with mild amount of purulent drainage at the site   Skin:     General: Skin is warm. Coloration: Skin is not jaundiced. Comments: Stage IV sacral decubitus ulcer with dressing not removed  Multiple wounds to the feet and lateral ankles dressing not removed  Left lateral hip wound dressing not removed   Neurological:      Mental Status: He is alert and oriented to person, place, and time. Comments: Quadriplegia          Past Medical History:   History reviewed. No pertinent past medical history.     Past Surgical  History:     Past Surgical History:   Procedure Laterality Date    CERVICAL FUSION N/A 12/1/2020    C7, CORPECTOMY WITH LUMBAR DRAIN PLACEMENT performed by Rufus Villanueva DO at [facility] N/A 12/3/2020    EGD PEG TUBE PLACEMENT performed by Katty Murrieta MD at [address]  02/09/2021    MOUTH HARDWARE REMOVAL - HYBRID IMF    HARDWARE REMOVAL N/A 2/9/2021    MOUTH HARDWARE REMOVAL - HYBRID IMF performed by Lelo Peterson MD at [facility]  12/14/2020    IR GASTROSTOMY TUBE PLACEMENT PERCUTANEOUS 12/14/2020 Sylvia Soler MD [facility] SPECIAL PROCEDURES    IR GASTROSTOMY TUBE PLACEMENT PERCUTANEOUS  12/15/2020    IR GASTROSTOMY TUBE PLACEMENT PERCUTANEOUS 12/15/2020 Sylvia Soler MD [facility] SPECIAL PROCEDURES    MANDIBLE FRACTURE SURGERY N/A 12/3/2020    HYBRID IMF EXTERNAL FIXATOR DEVICE MANDIBLE, SHARP EXCISIONAL DEBRIDEMENT, REPAIR OF COMPLEX WOUND RIGHT EYELID performed by Lelo Peterson MD at [address] [place] [address] N/A 5/5/2021    EXCISIONAL DEBRIDEMENT OF SACROCOCCYGEAL ULCER performed by Cherylene Brand, MD at [facility] N/A 12/3/2020    TRACHEOTOMY performed by Katty Murrieta MD at [facility]  12/1/2020    EGD ESOPHAGOGASTRODUODENOSCOPY performed by Ten Vega DO at [facility] OR       Medications:      vancomycin (VANCOCIN) 1250 mg in D5W 250 mL IVPB (ADDAVIAL)  1,000 mg Intravenous Q8H    cefepime  2,000 mg Intravenous Q8H    apixaban  5 mg Oral BID    gabapentin  300 mg Oral TID    sodium chloride flush  5-40 mL Intravenous 2 times per day    vitamin D3  400 Units Oral Daily    docusate sodium  100 mg Oral TID    DULoxetine  60 mg Oral Daily    famotidine  20 mg Oral Daily    melatonin  3 mg Oral Nightly    zonisamide  100 mg Oral Daily    fluticasone  1 spray Each Nostril Daily    vancomycin (VANCOCIN) intermittent dosing (placeholder)   Other RX Placeholder       Social History:     Social History     Socioeconomic History    Marital status: Single     Spouse name: Not on file    Number of children: Not on file    Years of education: Not on file    Highest education level: Not on file   Occupational History    Not on file   Social Needs    Financial resource strain: Not on file    Food insecurity     Worry: Not on file     Inability: Not on file    Transportation needs     Medical: Not on file     Non-medical: Not on file   Tobacco Use    Smoking status: Never Smoker    Smokeless tobacco: Never Used   Substance and Sexual Activity    Alcohol use: No    Drug use: Not Currently     Frequency: 1.0 times per week     Types: Marijuana     Comment: last time was late november 2020    Sexual activity: Not on file   Lifestyle    Physical activity     Days per week: Not on file     Minutes per session: Not on file    Stress: Not on file   Relationships    Social connections     Talks on phone: Not on file     Gets together: Not on file     Attends Jewish service: Not on file     Active member of club or organization: Not on file     Attends meetings of clubs or organizations: Not on file     Relationship status: Not on file    Intimate partner violence     Fear of current or ex partner: Not on file     Emotionally abused: Not on file     Physically abused: Not on file     Forced sexual activity: Not on file   Other Topics Concern    Not on file   Social History Narrative    ** Merged History Encounter **         Lives with dad, sister, and grandma. In 9th grade.        Family History:     Family History   Problem Relation Age of Onset    Asthma Paternal Uncle     High Blood Pressure Paternal Grandmother       Medical Decision Making:   I have independently reviewed/ordered the following labs:    CBC with Differential:   Recent Labs     05/04/21  1230   WBC 9.3   HGB 10.3*   HCT 32.2*   *     BMP:  Recent Labs     05/04/21  1230 05/05/21  0548    134*   K 3.6* 3.5*    99   CO2 26 24   BUN 2* 2*   CREATININE <0.40* <0.40*   MG  --  1.8     Hepatic Function Panel:   No results for input(s): PROT, LABALBU, BILIDIR, IBILI,

## 2021-05-06 NOTE — PROGRESS NOTES
I spoke with Dr Milli Diaz regarding discharge plans. She stated if wound is not down to the bone he can be changed to oral antibiotic at discharge. I informed her the op note states down to the muscle/fascia.  Electronically signed by Vinny Galindo RN on 5/6/2021 at 3:00 PM

## 2021-05-06 NOTE — PROGRESS NOTES
Per consultation in for valencia change. Valencia Change and Insertion Procedure:  Risks and Benefits discussed with patient prior to procedure. Placement Date: 5/6/21    Verbal consent obtained from patient prior to procedure. Patient old SP catheter in place, balloon deflated and was removed without complication. Lidocaine 2% 100 mg Jelly to SP area. New 16 F valencia inserted utilizing sterile technique per organization policy placed by Ale Mendez. 10 ml sterile water balloon inflated, attached to gravity bag. Will follow up in his Urology clinic for  routine  Valencia change.

## 2021-05-06 NOTE — PLAN OF CARE
Nutrition Problem #1: Severe malnutrition  Intervention: Food and/or Nutrient Delivery: Continue Current Diet, Continue Oral Nutrition Supplement  Nutritional Goals: po intake greater than 75%

## 2021-05-06 NOTE — PROGRESS NOTES
[facility]                 PATIENT NAME: Stefany Shahid     TODAY'S DATE: 5/6/2021, 9:34 AM    SUBJECTIVE:  POD#1  Pt seen and examined. Afebrile, VSS. S/p sharp excisional debridement of sacrococcygeal wound. Patient is doing well. Denies abdominal pain, N/V. Had a bowel movement overnight. No stool contamination of wound. Today wound is stable, clean. Small amount of bleeding noted at base but nothing significant. Black cauterized tissue noted. Would was cleansed, repacked, and redressed. Patient tolerated dressing change well. OBJECTIVE:   VITALS:  /63   Pulse 93   Temp 98.8 °F (37.1 °C) (Oral)   Resp 18   Ht 5' 5\" (1.651 m)   Wt 139 lb 15.9 oz (63.5 kg)   SpO2 98%   BMI 23.30 kg/m²      INTAKE/OUTPUT:      Intake/Output Summary (Last 24 hours) at 5/6/2021 0934  Last data filed at 5/5/2021 1945  Gross per 24 hour   Intake 3424 ml   Output 700 ml   Net 2724 ml                 CONSTITUTIONAL:  awake and alert.   No acute distress  HEART:   RRR  LUNGS:   Decreased air entry at bases, no wheezing   ABDOMEN:   Abdomen soft, non-tender, non-distended  EXTREMITIES:   No pedal edema    Data:  CBC:   Lab Results   Component Value Date    WBC 9.3 05/04/2021    RBC 3.91 05/04/2021    HGB 10.3 05/04/2021    HCT 32.2 05/04/2021    MCV 82.4 05/04/2021    MCH 26.5 05/04/2021    MCHC 32.1 05/04/2021    RDW 17.5 05/04/2021     05/04/2021    MPV 6.8 05/04/2021     BMP:    Lab Results   Component Value Date     05/05/2021    K 3.5 05/05/2021    CL 99 05/05/2021    CO2 24 05/05/2021    BUN 2 05/05/2021    LABALBU 3.0 05/02/2021    CREATININE <0.40 05/05/2021    CALCIUM 8.8 05/05/2021    GFRAA CANNOT BE CALCULATED 05/05/2021    LABGLOM CANNOT BE CALCULATED 05/05/2021    GLUCOSE 88 05/05/2021       Radiology Review:  No new images to review      ASSESSMENT     Principal Problem:    Cellulitis  Active Problems:    Pressure injury of coccygeal region, stage 3 ([facility] Chronic suprapubic catheter (HCC)    History of DVT (deep vein thrombosis)    Chronic anticoagulation    MRSA bacteremia  Resolved Problems:    * No resolved hospital problems. *      Plan  1. Diet as tolerated  2. Antibiotics per ID  3. Continue local wound care  4. Surgically stable  5. Continue medical management   6. Wound is stable. Clean. Dressing was changed. No significant contamination today. Continue wet-to-dry dressing changes twice daily. Wound care nurse to see. 7. Potentially proximal diverting loop colostomy.       Electronically signed by Kody Weinberg PA-C  [address] [address]

## 2021-05-06 NOTE — PROGRESS NOTES
Physical Therapy    DATE: 2021    NAME: Irma Felton  MRN: [id number]   : 2000      Patient not seen this date for Physical Therapy due to: Other: Pt busy with his cell phome, will not acknowledge presence of therapist after therapist introduces herself and reports the need for therpay assessment. Pt reposnds after few seconds, \"not now, come back later'. Will reattempt later if able, or otherwise tomorrow. P:M Attempted again at 13:30 P:M, pt refuses, does not feel good.        Electronically signed by Mey Lerma PT on 2021 at 9:38 AM

## 2021-05-07 ENCOUNTER — ANESTHESIA EVENT (OUTPATIENT)
Dept: OPERATING ROOM | Age: 21
DRG: 950 | End: 2021-05-07
Payer: MEDICAID

## 2021-05-07 ENCOUNTER — ANESTHESIA (OUTPATIENT)
Dept: OPERATING ROOM | Age: 21
DRG: 950 | End: 2021-05-07
Payer: MEDICAID

## 2021-05-07 VITALS — TEMPERATURE: 95.9 F | SYSTOLIC BLOOD PRESSURE: 160 MMHG | DIASTOLIC BLOOD PRESSURE: 100 MMHG | OXYGEN SATURATION: 100 %

## 2021-05-07 LAB
ANION GAP SERPL CALCULATED.3IONS-SCNC: 11 MMOL/L (ref 9–17)
BUN BLDV-MCNC: 5 MG/DL (ref 6–20)
BUN/CREAT BLD: ABNORMAL (ref 9–20)
CALCIUM SERPL-MCNC: 8.3 MG/DL (ref 8.6–10.4)
CHLORIDE BLD-SCNC: 105 MMOL/L (ref 98–107)
CO2: 22 MMOL/L (ref 20–31)
CREAT SERPL-MCNC: <0.4 MG/DL (ref 0.7–1.2)
GFR AFRICAN AMERICAN: ABNORMAL ML/MIN
GFR NON-AFRICAN AMERICAN: ABNORMAL ML/MIN
GFR SERPL CREATININE-BSD FRML MDRD: ABNORMAL ML/MIN/{1.73_M2}
GFR SERPL CREATININE-BSD FRML MDRD: ABNORMAL ML/MIN/{1.73_M2}
GLUCOSE BLD-MCNC: 102 MG/DL (ref 75–110)
GLUCOSE BLD-MCNC: 106 MG/DL (ref 70–99)
HCT VFR BLD CALC: 32.4 % (ref 41–53)
HEMOGLOBIN: 10.2 G/DL (ref 13.5–17.5)
MCH RBC QN AUTO: 26 PG (ref 26–34)
MCHC RBC AUTO-ENTMCNC: 31.6 G/DL (ref 31–37)
MCV RBC AUTO: 82.1 FL (ref 80–100)
NRBC AUTOMATED: ABNORMAL PER 100 WBC
PDW BLD-RTO: 18.1 % (ref 11.5–14.9)
PLATELET # BLD: 490 K/UL (ref 150–450)
PMV BLD AUTO: 6.9 FL (ref 6–12)
POTASSIUM SERPL-SCNC: 3.7 MMOL/L (ref 3.7–5.3)
RBC # BLD: 3.94 M/UL (ref 4.5–5.9)
SODIUM BLD-SCNC: 138 MMOL/L (ref 135–144)
TSH SERPL DL<=0.05 MIU/L-ACNC: 2.49 MIU/L (ref 0.3–5)
WBC # BLD: 9.4 K/UL (ref 4.5–13.5)

## 2021-05-07 PROCEDURE — 2580000003 HC RX 258: Performed by: SURGERY

## 2021-05-07 PROCEDURE — 84443 ASSAY THYROID STIM HORMONE: CPT

## 2021-05-07 PROCEDURE — 6360000002 HC RX W HCPCS: Performed by: SURGERY

## 2021-05-07 PROCEDURE — 3700000000 HC ANESTHESIA ATTENDED CARE: Performed by: SURGERY

## 2021-05-07 PROCEDURE — 99999 PR OFFICE/OUTPT VISIT,PROCEDURE ONLY: CPT | Performed by: PHYSICIAN ASSISTANT

## 2021-05-07 PROCEDURE — 82947 ASSAY GLUCOSE BLOOD QUANT: CPT

## 2021-05-07 PROCEDURE — 3600000002 HC SURGERY LEVEL 2 BASE: Performed by: SURGERY

## 2021-05-07 PROCEDURE — 3700000001 HC ADD 15 MINUTES (ANESTHESIA): Performed by: SURGERY

## 2021-05-07 PROCEDURE — 6360000002 HC RX W HCPCS: Performed by: NURSE ANESTHETIST, CERTIFIED REGISTERED

## 2021-05-07 PROCEDURE — 2500000003 HC RX 250 WO HCPCS: Performed by: NURSE ANESTHETIST, CERTIFIED REGISTERED

## 2021-05-07 PROCEDURE — 7100000000 HC PACU RECOVERY - FIRST 15 MIN: Performed by: SURGERY

## 2021-05-07 PROCEDURE — 3600000012 HC SURGERY LEVEL 2 ADDTL 15MIN: Performed by: SURGERY

## 2021-05-07 PROCEDURE — 99232 SBSQ HOSP IP/OBS MODERATE 35: CPT | Performed by: INTERNAL MEDICINE

## 2021-05-07 PROCEDURE — 2709999900 HC NON-CHARGEABLE SUPPLY: Performed by: SURGERY

## 2021-05-07 PROCEDURE — 2580000003 HC RX 258: Performed by: NURSE ANESTHETIST, CERTIFIED REGISTERED

## 2021-05-07 PROCEDURE — 36415 COLL VENOUS BLD VENIPUNCTURE: CPT

## 2021-05-07 PROCEDURE — 80048 BASIC METABOLIC PNL TOTAL CA: CPT

## 2021-05-07 PROCEDURE — 7100000001 HC PACU RECOVERY - ADDTL 15 MIN: Performed by: SURGERY

## 2021-05-07 PROCEDURE — 6370000000 HC RX 637 (ALT 250 FOR IP): Performed by: STUDENT IN AN ORGANIZED HEALTH CARE EDUCATION/TRAINING PROGRAM

## 2021-05-07 PROCEDURE — 85027 COMPLETE CBC AUTOMATED: CPT

## 2021-05-07 PROCEDURE — 0D1L0Z4 BYPASS TRANSVERSE COLON TO CUTANEOUS, OPEN APPROACH: ICD-10-PCS | Performed by: SURGERY

## 2021-05-07 PROCEDURE — 6370000000 HC RX 637 (ALT 250 FOR IP): Performed by: SURGERY

## 2021-05-07 PROCEDURE — 2060000000 HC ICU INTERMEDIATE R&B

## 2021-05-07 RX ORDER — ROCURONIUM BROMIDE 10 MG/ML
INJECTION, SOLUTION INTRAVENOUS PRN
Status: DISCONTINUED | OUTPATIENT
Start: 2021-05-07 | End: 2021-05-07 | Stop reason: SDUPTHER

## 2021-05-07 RX ORDER — FENTANYL CITRATE 50 UG/ML
INJECTION, SOLUTION INTRAMUSCULAR; INTRAVENOUS PRN
Status: DISCONTINUED | OUTPATIENT
Start: 2021-05-07 | End: 2021-05-07 | Stop reason: SDUPTHER

## 2021-05-07 RX ORDER — MORPHINE SULFATE 2 MG/ML
2 INJECTION, SOLUTION INTRAMUSCULAR; INTRAVENOUS EVERY 5 MIN PRN
Status: DISCONTINUED | OUTPATIENT
Start: 2021-05-07 | End: 2021-05-07 | Stop reason: HOSPADM

## 2021-05-07 RX ORDER — MEPERIDINE HYDROCHLORIDE 25 MG/ML
12.5 INJECTION INTRAMUSCULAR; INTRAVENOUS; SUBCUTANEOUS EVERY 5 MIN PRN
Status: DISCONTINUED | OUTPATIENT
Start: 2021-05-07 | End: 2021-05-07 | Stop reason: HOSPADM

## 2021-05-07 RX ORDER — AMOXICILLIN AND CLAVULANATE POTASSIUM 500; 125 MG/1; MG/1
1 TABLET, FILM COATED ORAL EVERY 8 HOURS SCHEDULED
Status: DISCONTINUED | OUTPATIENT
Start: 2021-05-07 | End: 2021-05-10 | Stop reason: HOSPADM

## 2021-05-07 RX ORDER — LABETALOL HYDROCHLORIDE 5 MG/ML
5 INJECTION, SOLUTION INTRAVENOUS EVERY 10 MIN PRN
Status: DISCONTINUED | OUTPATIENT
Start: 2021-05-07 | End: 2021-05-07 | Stop reason: HOSPADM

## 2021-05-07 RX ORDER — ONDANSETRON 2 MG/ML
INJECTION INTRAMUSCULAR; INTRAVENOUS PRN
Status: DISCONTINUED | OUTPATIENT
Start: 2021-05-07 | End: 2021-05-07 | Stop reason: SDUPTHER

## 2021-05-07 RX ORDER — DIPHENHYDRAMINE HYDROCHLORIDE 50 MG/ML
12.5 INJECTION INTRAMUSCULAR; INTRAVENOUS
Status: DISCONTINUED | OUTPATIENT
Start: 2021-05-07 | End: 2021-05-07 | Stop reason: HOSPADM

## 2021-05-07 RX ORDER — ONDANSETRON 2 MG/ML
4 INJECTION INTRAMUSCULAR; INTRAVENOUS
Status: DISCONTINUED | OUTPATIENT
Start: 2021-05-07 | End: 2021-05-07 | Stop reason: HOSPADM

## 2021-05-07 RX ORDER — LIDOCAINE HYDROCHLORIDE 10 MG/ML
INJECTION, SOLUTION EPIDURAL; INFILTRATION; INTRACAUDAL; PERINEURAL PRN
Status: DISCONTINUED | OUTPATIENT
Start: 2021-05-07 | End: 2021-05-07 | Stop reason: SDUPTHER

## 2021-05-07 RX ORDER — MIDAZOLAM HYDROCHLORIDE 1 MG/ML
INJECTION INTRAMUSCULAR; INTRAVENOUS PRN
Status: DISCONTINUED | OUTPATIENT
Start: 2021-05-07 | End: 2021-05-07 | Stop reason: SDUPTHER

## 2021-05-07 RX ORDER — OXYCODONE HYDROCHLORIDE AND ACETAMINOPHEN 5; 325 MG/1; MG/1
1 TABLET ORAL EVERY 4 HOURS PRN
Status: DISCONTINUED | OUTPATIENT
Start: 2021-05-07 | End: 2021-05-10 | Stop reason: HOSPADM

## 2021-05-07 RX ORDER — PROPOFOL 10 MG/ML
INJECTION, EMULSION INTRAVENOUS PRN
Status: DISCONTINUED | OUTPATIENT
Start: 2021-05-07 | End: 2021-05-07 | Stop reason: SDUPTHER

## 2021-05-07 RX ORDER — DEXAMETHASONE SODIUM PHOSPHATE 4 MG/ML
INJECTION, SOLUTION INTRA-ARTICULAR; INTRALESIONAL; INTRAMUSCULAR; INTRAVENOUS; SOFT TISSUE PRN
Status: DISCONTINUED | OUTPATIENT
Start: 2021-05-07 | End: 2021-05-07 | Stop reason: SDUPTHER

## 2021-05-07 RX ADMIN — MIDAZOLAM 2 MG: 1 INJECTION INTRAMUSCULAR; INTRAVENOUS at 09:25

## 2021-05-07 RX ADMIN — CEFEPIME 2000 MG: 2 INJECTION, POWDER, FOR SOLUTION INTRAVENOUS at 09:25

## 2021-05-07 RX ADMIN — DEXAMETHASONE SODIUM PHOSPHATE 4 MG: 4 INJECTION, SOLUTION INTRAMUSCULAR; INTRAVENOUS at 09:32

## 2021-05-07 RX ADMIN — LIDOCAINE HYDROCHLORIDE 50 MG: 10 INJECTION, SOLUTION EPIDURAL; INFILTRATION; INTRACAUDAL; PERINEURAL at 09:28

## 2021-05-07 RX ADMIN — PHENYLEPHRINE HYDROCHLORIDE 20 MCG/MIN: 10 INJECTION INTRAVENOUS at 09:32

## 2021-05-07 RX ADMIN — ROCURONIUM BROMIDE 50 MG: 10 INJECTION, SOLUTION INTRAVENOUS at 09:28

## 2021-05-07 RX ADMIN — AMOXICILLIN AND CLAVULANATE POTASSIUM 1 TABLET: 500; 125 TABLET, FILM COATED ORAL at 21:04

## 2021-05-07 RX ADMIN — FENTANYL CITRATE 25 MCG: 50 INJECTION, SOLUTION INTRAMUSCULAR; INTRAVENOUS at 09:28

## 2021-05-07 RX ADMIN — SODIUM CHLORIDE: 9 INJECTION, SOLUTION INTRAVENOUS at 09:13

## 2021-05-07 RX ADMIN — GABAPENTIN 300 MG: 300 CAPSULE ORAL at 21:05

## 2021-05-07 RX ADMIN — ONDANSETRON 4 MG: 2 INJECTION, SOLUTION INTRAMUSCULAR; INTRAVENOUS at 09:32

## 2021-05-07 RX ADMIN — VANCOMYCIN HYDROCHLORIDE 1000 MG: 1 INJECTION, POWDER, LYOPHILIZED, FOR SOLUTION INTRAVENOUS at 06:30

## 2021-05-07 RX ADMIN — CEFEPIME 2000 MG: 2 INJECTION, POWDER, FOR SOLUTION INTRAVENOUS at 02:52

## 2021-05-07 RX ADMIN — SUGAMMADEX 200 MG: 100 INJECTION, SOLUTION INTRAVENOUS at 10:20

## 2021-05-07 RX ADMIN — PROPOFOL 100 MG: 10 INJECTION, EMULSION INTRAVENOUS at 09:28

## 2021-05-07 RX ADMIN — SODIUM CHLORIDE: 9 INJECTION, SOLUTION INTRAVENOUS at 09:14

## 2021-05-07 ASSESSMENT — PULMONARY FUNCTION TESTS
PIF_VALUE: 14
PIF_VALUE: 4
PIF_VALUE: 13
PIF_VALUE: 12
PIF_VALUE: 13
PIF_VALUE: 13
PIF_VALUE: 14
PIF_VALUE: 13
PIF_VALUE: 14
PIF_VALUE: 14
PIF_VALUE: 13
PIF_VALUE: 14
PIF_VALUE: 13
PIF_VALUE: 15
PIF_VALUE: 3
PIF_VALUE: 14
PIF_VALUE: 13
PIF_VALUE: 14
PIF_VALUE: 14
PIF_VALUE: 13
PIF_VALUE: 14
PIF_VALUE: 13
PIF_VALUE: 13
PIF_VALUE: 15
PIF_VALUE: 15
PIF_VALUE: 3
PIF_VALUE: 13
PIF_VALUE: 14
PIF_VALUE: 4
PIF_VALUE: 14
PIF_VALUE: 13
PIF_VALUE: 14
PIF_VALUE: 14
PIF_VALUE: 15

## 2021-05-07 ASSESSMENT — LIFESTYLE VARIABLES: SMOKING_STATUS: 0

## 2021-05-07 ASSESSMENT — ENCOUNTER SYMPTOMS
STRIDOR: 0
SHORTNESS OF BREATH: 0

## 2021-05-07 NOTE — CARE COORDINATION
GERMAINE called admissions at [facility] in [place] several time on this date and did not receive a call back. GERMAINE called again and spoke with the  of the building. He reported that they had denied this patient. GERMAINE then spoke with Geri Godwin in admissions for [facility] and she reported that they do work with wounds. GERMAINE informed her that SW was faxing a new referral.  GERMAINE then faxed the referral to [facility].

## 2021-05-07 NOTE — CARE COORDINATION
Social work waiting for ok from [employer] for snf placement. Completed hens. Will need matty and Rx at discharge,  Will need matty to send in with hens for a level of care Saturday am.  Not available on sundays. If pt is ready can get this done and ready. Will follow Saturday.   Lita angelo
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
## 2021-05-07 NOTE — PROGRESS NOTES
[facility] -   Infectious diseases evaluation  admission date 5/2/2021    reason for consultation:   Coccyx wound  Impression :   Current:  · Necrotic stage IV coccyx ulcer with surrounding cellulitis status post excisional debridement  · Possible UTI, no growth on urine culture  · Sepsis secondary to above  · S/p proximal diverting transverse loop colostomy 5/7/2021  · 1/2 blood culture positive for staphylococcus epidermidis likely contamination  · Left lateral hip wound  · Left foot heel, dorsum and lateral ankle wounds  · Right heel and lateral ankle wounds  · Quadriplegia secondary to spinal cord injury  · Neurogenic bladder status post suprapubic catheter  · DVT on Eliquis  · History of subchronic hemorrhage    Recommendations   · Stop vancomycin and cefepime  · Augmentin for 5 days  · Echocardiogram  showed no vegetations  · Urology changed suprapubic catheter 5/6/2021  · Follow cultures  · Follow CBC and renal function    History of Present Illness:   Initial history:  Stefany Shahid is a 23y.o.-year-old male quadriplegic secondary to spinal cord injury presented to the hospital with worsening chills and back pain for several days. He had multiple wounds to the coccyx, bilateral feet and ankles. He was tachycardic on admission, WBC 17  Sed rate and C-reactive protein elevated  Urinalysis showed moderate leukocyte esterase, 20-50 WBCs  CT abdomen and pelvis showed left gluteal fat stranding suggestive of cellulitis, no abscess, urinary bladder calculi, unchanged appearance of coccyx decubitus ulcer. COVID-19 rapid test was negative  Chest x-ray no acute process    Interval changes  5/7/2021   Patient was seen and examined at bedside this morning. Patient had his suprapubic cath exchanged yesterday per urology. Patient is morning is gone for surgery diverging transverse loop colostomy. [place] patient states he is feeling cold covered in multiple blankets. Comments: Stage IV sacral decubitus ulcer with dressing not removed  Multiple wounds to the feet and lateral ankles dressing not removed  Left lateral hip wound dressing not removed   Neurological:      Mental Status: He is alert and oriented to person, place, and time.       Comments: Quadriplegia          Past Medical History:     Past Medical History:   Diagnosis Date    Major depressive disorder, recurrent severe without psychotic features ([facility] [address] 5/6/2021       Past Surgical  History:     Past Surgical History:   Procedure Laterality Date    CERVICAL FUSION N/A 12/1/2020    C7, CORPECTOMY WITH LUMBAR DRAIN PLACEMENT performed by Ion Puga DO at [facility] N/A 12/3/2020    EGD PEG TUBE PLACEMENT performed by Millicent Hinojosa MD at [address] [place] [address]  02/09/2021    MOUTH HARDWARE REMOVAL - HYBRID IMF    HARDWARE REMOVAL N/A 2/9/2021    MOUTH HARDWARE REMOVAL - HYBRID IMF performed by Lynita Leventhal, MD at [place] [address]  12/14/2020    IR GASTROSTOMY TUBE PLACEMENT PERCUTANEOUS 12/14/2020 Rashaad Sutton MD [facility] SPECIAL PROCEDURES    IR GASTROSTOMY TUBE PLACEMENT PERCUTANEOUS  12/15/2020    IR GASTROSTOMY TUBE PLACEMENT PERCUTANEOUS 12/15/2020 Rashaad Sutton MD [facility] SPECIAL PROCEDURES    MANDIBLE FRACTURE SURGERY N/A 12/3/2020    HYBRID IMF EXTERNAL FIXATOR DEVICE MANDIBLE, SHARP EXCISIONAL DEBRIDEMENT, REPAIR OF COMPLEX WOUND RIGHT EYELID performed by Lynita Leventhal, MD at [address] [place] [address] N/A 5/5/2021    EXCISIONAL DEBRIDEMENT OF SACROCOCCYGEAL ULCER performed by Amalia Foley MD at [address] [place] [address] N/A 5/7/2021    TRANSVERSE LOOP COLOSTOMY performed by Amalia Foley MD at [address] [facility] 12/3/2020    TRACHEOTOMY performed by Millicent Hinojosa MD at [place] [address]  12/1/2020    EGD ESOPHAGOGASTRODUODENOSCOPY performed by Akil Bay DO at [facility] OR       Medications:      [START ON 5/8/2021] apixaban  5 mg Oral BID    amoxicillin-clavulanate  1 tablet Oral 3 times per day    gabapentin  300 mg Oral TID    sodium chloride flush  5-40 mL Intravenous 2 times per day    vitamin D3  400 Units Oral Daily    docusate sodium  100 mg Oral TID    DULoxetine  60 mg Oral Daily    famotidine  20 mg Oral Daily    melatonin  3 mg Oral Nightly    zonisamide  100 mg Oral Daily    fluticasone  1 spray Each Nostril Daily    vancomycin (VANCOCIN) intermittent dosing (placeholder)   Other RX Placeholder       Social History:     Social History     Socioeconomic History    Marital status: Single     Spouse name: Not on file    Number of children: Not on file    Years of education: Not on file    Highest education level: Not on file   Occupational History    Not on file   Social Needs    Financial resource strain: Not on file    Food insecurity     Worry: Not on file     Inability: Not on file    Transportation needs     Medical: Not on file     Non-medical: Not on file   Tobacco Use    Smoking status: Never Smoker    Smokeless tobacco: Never Used   Substance and Sexual Activity    Alcohol use: No    Drug use: Not Currently     Frequency: 1.0 times per week     Types: Marijuana     Comment: last time was late november 2020    Sexual activity: Not on file   Lifestyle    Physical activity     Days per week: Not on file     Minutes per session: Not on file    Stress: Not on file   Relationships    Social connections     Talks on phone: Not on file     Gets together: Not on file     Attends Christian service: Not on file     Active member of club or organization: Not on file     Attends meetings of clubs or organizations: Not on file     Relationship status: Not on file    Intimate partner violence     Fear of current or ex partner: Not on file     Emotionally abused: Not on file Physically abused: Not on file     Forced sexual activity: Not on file   Other Topics Concern    Not on file   Social History Narrative    ** Merged History Encounter **         Lives with dad, sister, and grandma. In 9th grade. Family History:     Family History   Problem Relation Age of Onset    Asthma Paternal Uncle     High Blood Pressure Paternal Grandmother       Medical Decision Making:   I have independently reviewed/ordered the following labs:    CBC with Differential:   Recent Labs     05/07/21 0524   WBC 9.4   HGB 10.2*   HCT 32.4*   *     BMP:  Recent Labs     05/05/21  0548 05/07/21  0524   * 138   K 3.5* 3.7   CL 99 105   CO2 24 22   BUN 2* 5*   CREATININE <0.40* <0.40*   MG 1.8  --      Hepatic Function Panel:   No results for input(s): PROT, LABALBU, BILIDIR, IBILI, BILITOT, ALKPHOS, ALT, AST in the last 72 hours. No results for input(s): RPR in the last 72 hours. No results for input(s): HIV in the last 72 hours. No results for input(s): BC in the last 72 hours. Lab Results   Component Value Date    CREATININE <0.40 05/07/2021    GLUCOSE 106 05/07/2021       Detailed results: Thank you for allowing us to participate in the care of this patient. Please call with questions. This note is created with the assistance of a speech recognition program.  While intending to generate adocument that actually reflects the content of the visit, the document can still have some errors including those of syntax and sound a like substitutions which may escape proof reading. It such instances, actual meaningcan be extrapolated by contextual diversion. [facility] Resident   Attending Physician Statement  I have discussed the care of the patient, including pertinent history and exam findings,  with the resident. I have reviewed the key elements of all parts of the encounter with the resident.   I agree with the assessment, plan and orders as documented by

## 2021-05-07 NOTE — PROGRESS NOTES
Physical Therapy    DATE: 2021    NAME: Raya Eagle  MRN: [id number]   : 2000      Patient not seen this date for Physical Therapy due to:    Surgery/Procedure: Pt off the floor for surgery-loop colostomy.  Will check on 21      Electronically signed by Nir Bocanegra PT on 2021 at 9:23 AM

## 2021-05-07 NOTE — PLAN OF CARE
Problem: Falls - Risk of:  Goal: Will remain free from falls  Description: Will remain free from falls  5/7/2021 1443 by Molly Gómez RN  Outcome: Met This Shift     Problem: Falls - Risk of:  Goal: Absence of physical injury  Description: Absence of physical injury  5/7/2021 1443 by Molly Gómez RN  Outcome: Met This Shift     Problem: Pain:  Goal: Pain level will decrease  Description: Pain level will decrease  5/7/2021 1443 by Molly Gómez RN  Outcome: Met This Shift     Problem: Pain:  Goal: Control of acute pain  Description: Control of acute pain  5/7/2021 1443 by Molly Gómez RN  Outcome: Met This Shift     Problem: Pain:  Goal: Control of chronic pain  Description: Control of chronic pain  5/7/2021 1443 by Molly Gómez RN  Outcome: Met This Shift     Problem: Skin Integrity:  Goal: Absence of new skin breakdown  Description: Absence of new skin breakdown  5/7/2021 1443 by Molly Gómez RN  Outcome: Ongoing     Problem: Nutrition  Goal: Optimal nutrition therapy  Description: Nutrition Problem #1: Severe malnutrition  Intervention: Food and/or Nutrient Delivery: Continue Current Diet, Start Oral Nutrition Supplement  Nutritional Goals: po intake greater than 75%     5/7/2021 1443 by Molly Gómez RN  Outcome: Ongoing     Problem: Skin Integrity:  Goal: Will show no infection signs and symptoms  Description: Will show no infection signs and symptoms  5/7/2021 1443 by Molly Gómez RN  Outcome: Not Met This Shift

## 2021-05-07 NOTE — CARE COORDINATION
GERMAINE attempted to call Ta Laws in admissions with [facility] to see if they have an answer as to whether they can accept this patient. GERMAINE had to leave a  Message. Waiting for a return call.

## 2021-05-07 NOTE — PLAN OF CARE
Problem: Falls - Risk of:  Goal: Will remain free from falls  Description: Will remain free from falls  Outcome: Ongoing   Pt assessed as a fall risk this shift. Remains free from falls and accidental injury at this time. Fall precautions in place, including falling star sign and fall risk band on pt. Floor free from obstacles, and bed is locked and in lowest position. Adequate lighting provided. Pt encouraged to call before getting OOB for any need. Bed alarm activated. Will continue to monitor needs during hourly rounding, and reinforce education on use of call light.     Problem: Pain:  Goal: Pain level will decrease  Description: Pain level will decrease  Outcome: Ongoing   Pain meds prn    Problem: Nutrition  Goal: Optimal nutrition therapy  Description: Nutrition Problem #1: Severe malnutrition  Intervention: Food and/or Nutrient Delivery: Continue Current Diet, Start Oral Nutrition Supplement  Nutritional Goals: po intake greater than 75%     5/7/2021 0431 by Lakeisha Ratliff RN  Outcome: Ongoing   Pt states his appetite is getting better
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
## 2021-05-07 NOTE — PROGRESS NOTES
[address]   OCCUPATIONAL THERAPY MISSED TREATMENT NOTE   INPATIENT   Date: 21  Patient Name: Nelson Tran       Room:   MRN: [id number]   Account #: [de-identified]    : 2000  (23 y.o.)  Gender: male                 REASON FOR MISSED TREATMENT:  Patient unable to participate   -   Surgery  NEEDS EVAL  - Surgery             pt request OT returns after lunch.  debridement of decubitus wounds         patient refuses therapy today - \"I've got a lot going on right now and I'm in pain\", declines to be checked on in the afternoon.  Reports he is \"picked up\" from bed<>chair at home      [facility]

## 2021-05-07 NOTE — PROGRESS NOTES
[address]   OCCUPATIONAL THERAPY MISSED TREATMENT NOTE   INPATIENT   Date: 21  Patient Name: Trudy Bradshaw       Room: 43 [facility]  MRN: [id number]   Account #: [de-identified]    : 2000  (23 y.o.)  Gender: male               REASON FOR MISSED TREATMENT:  Patient at testing and/or off the floor   -   Pt off the floor for surgery-loop colostomy. Occupational therapy will attempt again when able.           Pau Duffy OT

## 2021-05-07 NOTE — PROGRESS NOTES
Bedside reporting done, per [facility] and Roya RN's  IV intact, no redness noted  Telemetry cont. On pt  No complaints at this time  Dressing dry and intact to coccyx area  Off loading boots cont. On pt  Colostomy intact  Special mattress cont. On pt's bed.   Bed alarm on

## 2021-05-07 NOTE — ANESTHESIA POSTPROCEDURE EVALUATION
Department of Anesthesiology  Postprocedure Note    Patient: Boogie Corrigan  MRN: [id number]  YOB: 2000  Date of evaluation: 5/7/2021  Time:  12:19 PM     Procedure Summary     Date: 05/07/21 Room / Location: 18 [address]  / [place] [id number]    Anesthesia Start: 7305 Anesthesia Stop: 0155    Procedure: TRANSVERSE LOOP COLOSTOMY (N/A Abdomen) Diagnosis: (NONHEALING DECUBITUS ULCER)    Surgeons: Giorgi Crespo MD Responsible Provider: Nadia Judge MD    Anesthesia Type: general ASA Status: 3          Anesthesia Type: general    June Phase I: June Score: 9    June Phase II:      Last vitals: Reviewed and per EMR flowsheets.        Anesthesia Post Evaluation    Comments: POST- ANESTHESIA EVALUATION       Pt Name: Boogie Corrigan  MRN: [id number]  YOB: 2000  Date of evaluation: 5/7/2021  Time:  12:19 PM      /86   Pulse 84   Temp 97.5 °F (36.4 °C) (Infrared)   Resp 13   Ht 5' 5\" (1.651 m)   Wt 147 lb (66.7 kg)   SpO2 100%   BMI 24.46 kg/m²      Consciousness Level  Awake  Cardiopulmonary Status  Stable  Pain Adequately Treated YES  Nausea / Vomiting  NO  Adequate Hydration  YES  Anesthesia Related Complications NONE      Electronically signed by Saulo Dubois MD on 5/7/2021 at 12:19 PM

## 2021-05-08 PROCEDURE — 6370000000 HC RX 637 (ALT 250 FOR IP): Performed by: STUDENT IN AN ORGANIZED HEALTH CARE EDUCATION/TRAINING PROGRAM

## 2021-05-08 PROCEDURE — 97161 PT EVAL LOW COMPLEX 20 MIN: CPT

## 2021-05-08 PROCEDURE — 99232 SBSQ HOSP IP/OBS MODERATE 35: CPT | Performed by: INTERNAL MEDICINE

## 2021-05-08 PROCEDURE — 6370000000 HC RX 637 (ALT 250 FOR IP): Performed by: SURGERY

## 2021-05-08 PROCEDURE — 2580000003 HC RX 258: Performed by: SURGERY

## 2021-05-08 PROCEDURE — 97110 THERAPEUTIC EXERCISES: CPT

## 2021-05-08 PROCEDURE — 2060000000 HC ICU INTERMEDIATE R&B

## 2021-05-08 RX ADMIN — ZONISAMIDE 100 MG: 100 CAPSULE ORAL at 09:54

## 2021-05-08 RX ADMIN — APIXABAN 5 MG: 5 TABLET, FILM COATED ORAL at 21:01

## 2021-05-08 RX ADMIN — GABAPENTIN 300 MG: 300 CAPSULE ORAL at 18:12

## 2021-05-08 RX ADMIN — AMOXICILLIN AND CLAVULANATE POTASSIUM 1 TABLET: 500; 125 TABLET, FILM COATED ORAL at 21:01

## 2021-05-08 RX ADMIN — GABAPENTIN 300 MG: 300 CAPSULE ORAL at 09:54

## 2021-05-08 RX ADMIN — GABAPENTIN 300 MG: 300 CAPSULE ORAL at 21:01

## 2021-05-08 RX ADMIN — AMOXICILLIN AND CLAVULANATE POTASSIUM 1 TABLET: 500; 125 TABLET, FILM COATED ORAL at 18:11

## 2021-05-08 RX ADMIN — AMOXICILLIN AND CLAVULANATE POTASSIUM 1 TABLET: 500; 125 TABLET, FILM COATED ORAL at 05:33

## 2021-05-08 RX ADMIN — SODIUM CHLORIDE: 9 INJECTION, SOLUTION INTRAVENOUS at 01:50

## 2021-05-08 RX ADMIN — CHOLECALCIFEROL TAB 10 MCG (400 UNIT) 400 UNITS: 10 TAB at 09:54

## 2021-05-08 RX ADMIN — DOCUSATE SODIUM 100 MG: 100 CAPSULE, LIQUID FILLED ORAL at 09:54

## 2021-05-08 RX ADMIN — SODIUM CHLORIDE: 9 INJECTION, SOLUTION INTRAVENOUS at 21:45

## 2021-05-08 RX ADMIN — APIXABAN 5 MG: 5 TABLET, FILM COATED ORAL at 09:54

## 2021-05-08 RX ADMIN — SODIUM CHLORIDE: 9 INJECTION, SOLUTION INTRAVENOUS at 10:55

## 2021-05-08 ASSESSMENT — PAIN DESCRIPTION - LOCATION: LOCATION: ARM

## 2021-05-08 NOTE — PROGRESS NOTES
Patient was seen and examined. Awake alert in no acute distress. Pain is controlled. Afebrile vital signs are stable. On a general diet. Abdomen is soft. Colostomy is viable and pink. No significant function. Decubitus ulcer is stable. No new labs. Continue medical management. Discharge planning.

## 2021-05-08 NOTE — PROGRESS NOTES
Physical Therapy    Facility/Department: [address] [place] [address] CARE  Initial Assessment    NAME: Magali Lima  : 2000  MRN: [id number]    Date of Service: 2021   Chief Complaint   Patient presents with    Wound Check     Discharge Recommendations:  Continue to assess pending progress   PT Equipment Recommendations  Equipment Needed: No  Other: Pt reports he has a WC at home. Assessment   Body structures, Functions, Activity limitations: Decreased functional mobility ; Decreased ROM  Assessment: Pt has limited mobility and is dependent for transfers and nonambulatory due to previous SCI. Pt with pain c/o in (R) UE and limits its use due to the pain. Pt would benefit from ROM to (B) LE's and education in bed mobility for pressure relief. Pt should be able to return home with 24 hour supervision/care. Due to pts limited mobility status further therapy upon discharge may not be needed. Treatment Diagnosis: dec mobility  Prognosis: Fair  Decision Making: Low Complexity  PT Education: Goals;Plan of Care;General Safety  REQUIRES PT FOLLOW UP: Yes  Activity Tolerance  Activity Tolerance: Patient limited by pain       Patient Diagnosis(es): The encounter diagnosis was Septicemia ([facility] [address] has a past medical history of Major depressive disorder, recurrent severe without psychotic features ([address] has a past surgical history that includes cervical fusion (N/A, 2020); Upper gastrointestinal endoscopy (2020); Mandible fracture surgery (N/A, 12/3/2020); tracheostomy (N/A, 12/3/2020); Gastrostomy tube placement (N/A, 12/3/2020); IR FLUORO GUIDED GASTROSTOMY TUBE INSERTION PERC W CONTRAST (2020); IR FLUORO GUIDED GASTROSTOMY TUBE INSERTION PERC W CONTRAST (12/15/2020); Hardware Removal (2021); Hardware Removal (N/A, 2021); Pressure ulcer debridement (N/A, 2021); and Small intestine surgery (N/A, 2021).     Restrictions  Restrictions/Precautions  Restrictions/Precautions: Contact Precautions, General Precautions, Fall Risk  Required Braces or Orthoses?: No  Position Activity Restriction  Other position/activity restrictions: MDRO-contact precautions  Vision/Hearing  Vision: Impaired(blind right eye)  Hearing: Within functional limits     Subjective  General  Patient assessed for rehabilitation services?: Yes  Response To Previous Treatment: Not applicable  Family / Caregiver Present: No  Referral Date : 05/03/21  Diagnosis: cellulitis  Follows Commands: Within Functional Limits  Subjective  Subjective: Pt supine in bed and agreeable to therapy. Pt c/o right UE pain currently with warm moist pad on arm. Pain Screening  Patient Currently in Pain: Yes  Pain Assessment  Pain Assessment: 0-10  Pain Type: Chronic pain;Acute pain  Pain Location: Arm  Pain Orientation: Right  Vital Signs  Patient Currently in Pain: Yes  Pre Treatment Pain Screening  Comments / Details: Pt has wounds on sacral/coccyx area and has (B) feet wrapped    Orientation  Orientation  Overall Orientation Status: Within Normal Limits  Social/Functional History  Social/Functional History  Lives With: Family  Type of Home: Apartment  Home Layout: One level  Home Equipment: [employer] Help From: Family  ADL Assistance: Needs assistance  Homemaking Assistance: Needs assistance  Homemaking Responsibilities: No  Ambulation Assistance: Needs assistance  Transfer Assistance: Needs assistance(pt reports he gets \"picked up\" for transfers)  Active : No  Additional Comments: Pt has assistance from girlfriend, sister, and cousins; pt reports he spends most of his time in bed; pt reports he is pushed in his WC due to (R) UE/hand pain  Cognition   Cognition  Overall Cognitive Status: WFL    Objective          PROM RLE (degrees)  RLE PROM: WFL  PROM LLE (degrees)  LLE PROM: WFL  Strength RLE  Comment: No active movement noted. Strength LLE  Comment: No active movement noted.      Sensation  Overall Sensation Status: Impaired(no sensation (B) LE's)  Bed mobility  Supine to Sit: Unable to assess  Sit to Supine: Unable to assess  Scooting: Unable to assess  Comment: Pt declined bed mobility due to (R) UE/hand pain. Transfers  Sit to Stand: Dependent/Total  Stand to sit: Dependent/Total  Stand Pivot Transfers: Dependent/Total  Comment: Pt reports being \"picked up\" at home for transfers; does not have a lift available at home. Ambulation  Ambulation?: No  Stairs/Curb  Stairs?: No     Balance  Comments: Did not assess balance this date; pt is nonambulatory and transfers via max assistance. Exercises  Comments: PROM (B) LE's x 10 reps. Plan   Plan  Times per week: 5x/week  Times per day: Daily  Current Treatment Recommendations: ROM, Patient/Caregiver Education & Training, Functional Mobility Training  Safety Devices  Type of devices: Call light within reach, Left in bed  Restraints  Initially in place: No    G-Code       OutComes Score                                                  AM-PAC Score     AM-PAC Inpatient Mobility without Stair Climbing Raw Score : 5 (05/08/21 1413)  AM-PAC Inpatient without Stair Climbing T-Scale Score : 23.59 (05/08/21 1413)  Mobility Inpatient CMS 0-100% Score: 100 (05/08/21 1413)  Mobility Inpatient without Stair CMS G-Code Modifier : CN (05/08/21 1413)       Goals  Short term goals  Time Frame for Short term goals: 10 days  Short term goal 1: Pt to tolerate PROM/A-AROM (B) LE's and UE's x 10 reps. Short term goal 2: Pt to perform rolling with Min-CGA for pressure relief and hygiene. Short term goal 3: Pt to sit edge of bed x 10 minutes with Min-Mod assist to prepare for transfers.   Patient Goals   Patient goals : return home       Therapy Time   Individual Concurrent Group Co-treatment   Time In 1300         Time Out 1330         Minutes 30         Timed Code Treatment Minutes: [address] PT

## 2021-05-08 NOTE — PROGRESS NOTES
[place] [address] Internal Medicine    Progress Note     5/8/2021    11:05 AM    Name:   Magali Lima  MRN:     [id number]     Acct:      [de-identified]   Room:   2091/2091-01  IP Day:  5  Admit Date:  5/2/2021 10:15 PM    PCP:   Gunner Longoria MD  Code Status:  Full Code    Subjective:     C/C:   Chief Complaint   Patient presents with    Wound Check     Principal Problem:    Cellulitis  Active Problems:    Pressure injury of coccygeal region, stage 3 ([address]    Chronic suprapubic catheter ([address]    History of DVT (deep vein thrombosis)    Chronic anticoagulation    MRSA bacteremia    Major depressive disorder, recurrent severe without psychotic features ([address]  Resolved Problems:    * No resolved hospital problems. *      Interval History Status: not changed. Patient has history of C7 cervical fracture, complete spinal cord injury, history of DVT on anticoagulation severe protein calorie malnutrition, was admitted with coccyx wound infection, patient underwent CT abdomen pelvis, concerning for large wound in his back  5/5   Underwent debridement  Plan for loop colostomy    5/6  Patient, refusing lab draw, refusing medication  As per nursing report, has very poor oral intake  5/7   Patient underwent diversion colostomy  Feeling very cold  5/8  Patient refusing Cymbalta which is discontinued  Underwent loop colostomy yesterday  Colostomy still not working  Will wait for colostomy function before planning for discharge     Significant last 24 hr data reviewed ;   Vitals:    05/07/21 1200 05/07/21 1918 05/08/21 0050 05/08/21 0715   BP: (!) 147/97 105/68 114/75 128/78   Pulse: 109 112 90 77   Resp: 16 16 16 16   Temp: 97.3 °F (36.3 °C) 99.5 °F (37.5 °C) 98.4 °F (36.9 °C) 98.9 °F (37.2 °C)   TempSrc: Oral Oral Oral Oral   SpO2: 100% 100% 99% 99%   Weight:   148 lb 2.4 oz (67.2 kg)    Height:          No results found for this or any previous visit (from the past 24 hour(s)).   Recent Labs subcutaneous soft tissues are unremarkable in appearance. 1. Left gluteal fat stranding suggesting presence of cellulitis. 2. No evidence of organized soft tissue abscess. 3. Suspected multifocal small dependent calculi/gravel within the urinary bladder lumen in setting of suprapubic Serrano catheter. 4. Unchanged appearance of decubitus ulceration at the posterior margin of the coccyx. Xr Chest Portable    Result Date: 5/2/2021  EXAMINATION: ONE XRAY VIEW OF THE CHEST 5/2/2021 10:41 pm COMPARISON: 03/19/2021 HISTORY: ORDERING SYSTEM PROVIDED HISTORY: tachycardia TECHNOLOGIST PROVIDED HISTORY: tachycardia Reason for Exam: tachycardia   pt refused to lower has hands any further Acuity: Acute Type of Exam: Initial Additional signs and symptoms: tachycardia   pt refused to lower has hands any further Relevant Medical/Surgical History: tachycardia   pt refused to lower has hands any further FINDINGS: Chest radiograph: The cardiomediastinal silhouette and hilar contours are normal. The lungs are clear with no focal consolidation, pleural effusion or pneumothorax. The overlying soft tissue and osseous structures do not demonstrate acute abnormality. No acute intrathoracic pathology. HPI:   See history in H and P      Review of Systems:     Constitutional:  negative for chills, fevers, sweats  Respiratory:  negative for cough, dyspnea on exertion, hemoptysis, shortness of breath, wheezing  Cardiovascular:  negative for chest pain, chest pressure/discomfort, lower extremity edema, palpitations  Gastrointestinal:  negative for abdominal pain, constipation, diarrhea, nausea, vomiting  Neurological: Patient is paraplegic, complaining of generalized body ache especially in right arm  Data:     Past Medical History:  no change     Social History:  no change    Family History: @no change    Vitals:      I/O (24Hr):     Intake/Output Summary (Last 24 hours) at 5/8/2021 1105  Last data filed at 5/8/2021 1006  Gross per 24 hour   Intake 2579.17 ml   Output 1681 ml   Net 898.17 ml       Labs:    URINE ANALYSIS: No results found for: LABURIN     CBC:  Lab Results   Component Value Date    WBC 9.4 2021    HGB 10.2 2021     2021        BMP:    Lab Results   Component Value Date     2021    K 3.7 2021     2021    CO2 22 2021    BUN 5 2021    CREATININE <0.40 2021    GLUCOSE 106 2021      LIVER PROFILE:  Lab Results   Component Value Date    ALT 7 2021    AST 14 2021    PROT 7.5 2021    BILITOT 0.34 2021    LABALBU 3.0 2021               Radiology:  Medications:      Allergies:      Current Meds:   Scheduled Meds:    apixaban  5 mg Oral BID    amoxicillin-clavulanate  1 tablet Oral 3 times per day    gabapentin  300 mg Oral TID    sodium chloride flush  5-40 mL Intravenous 2 times per day    vitamin D3  400 Units Oral Daily    docusate sodium  100 mg Oral TID    DULoxetine  60 mg Oral Daily    famotidine  20 mg Oral Daily    melatonin  3 mg Oral Nightly    zonisamide  100 mg Oral Daily    fluticasone  1 spray Each Nostril Daily     Continuous Infusions:    sodium chloride 125 mL/hr at 21 1055    sodium chloride       PRN Meds: oxyCODONE-acetaminophen, sodium chloride flush, sodium chloride, potassium chloride **OR** potassium alternative oral replacement **OR** potassium chloride, magnesium sulfate, promethazine **OR** ondansetron, polyethylene glycol, nicotine, acetaminophen **OR** acetaminophen, albuterol, LORazepam      Physical Examination:        /78   Pulse 77   Temp 98.9 °F (37.2 °C) (Oral)   Resp 16   Ht 5' 5\" (1.651 m)   Wt 148 lb 2.4 oz (67.2 kg)   SpO2 99%   BMI 24.65 kg/m²   Temp (24hrs), Av.3 °F (36.8 °C), Min:97.3 °F (36.3 °C), Max:99.5 °F (37.5 °C)    Recent Labs     21  1047   POCGLU 102       Intake/Output Summary (Last 24 hours) at 2021 1105  Last data filed at 5/8/2021 1006  Gross per 24 hour   Intake 2579.17 ml   Output 1681 ml   Net 898.17 ml       General Appearance:  alert, well appearing, and in no acute distress, very thin built malnourished young man  Mental status: oriented to person, place, and time with normal affect  Head:  normocephalic, atraumatic. Eye: no icterus, redness, pupils equal and reactive, extraocular eye movements intact, conjunctiva clear  Ear: normal external ear, no discharge, hearing intact  Nose:  no drainage noted  Mouth: mucous membranes moist  Neck: supple, no carotid bruits, thyroid not palpable  Lungs: Bilateral equal air entry, clear to ausculation, no wheezing, rales or rhonchi, normal effort  Cardiovascular: normal rate, regular rhythm, no murmur, gallop, rub. 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  Extremities:  peripheral pulses palpable, no pedal edema or calf pain with palpation  Psych:       Assessment:        Primary Problem  Cellulitis    Active Hospital Problems    Diagnosis Date Noted    Major depressive disorder, recurrent severe without psychotic features [facility] [F33.2] 05/06/2021    MRSA bacteremia [R78.81, B95.62] 05/04/2021    Cellulitis [L03.90] 05/03/2021    History of DVT (deep vein thrombosis) [Z86.718] 05/03/2021    Chronic anticoagulation [Z79.01] 05/03/2021    Chronic suprapubic catheter ([facility] [Z93.59] 03/20/2021    Pressure injury of coccygeal region, stage 3 [facility]) [L89.153] 02/23/2021   Principal Problem:    Cellulitis  Active Problems:    Pressure injury of coccygeal region, stage 3 (HCC)    Chronic suprapubic catheter (HCC)    History of DVT (deep vein thrombosis)    Chronic anticoagulation    MRSA bacteremia    Major depressive disorder, recurrent severe without psychotic features ([facility]  Resolved Problems:    * No resolved hospital problems. *      Plan:        Stage IV coccyx ulcer, , underwent Débridement , underwent diversion colostomy, on Augmentin  Echo negative for vegetation  Complete paraplegia, has multiple pressure sores, wound care following  Purulent secretion around PEG tube site, concern for possible UTI, patient was admitted in [facility] in month of March with UTI, urine culture is negative . Catheter changed on 5/6  Patient has neuropathic pain, started Cymbalta,    6 Patient has history of DVT, on Eliquis  7 . Awaiting colostomy to function.   Bin Pizano MD  5/8/2021  11:05 AM

## 2021-05-08 NOTE — CARE COORDINATION
Social work faxed new hens with [employer] on it with all required documents to Area office on aging for level of care. Will hear possibly late today if ok for snf level of care. This if approved with enable snf to be paid.   Shefali angelo

## 2021-05-08 NOTE — PROGRESS NOTES
[facility] -   Infectious diseases evaluation  admission date 5/2/2021    reason for consultation:   Coccyx wound  Impression :   Current:  · Necrotic stage IV coccyx ulcer with surrounding cellulitis status post excisional debridement  · Possible UTI, no growth on urine culture  · Sepsis secondary to above  · S/p proximal diverting transverse loop colostomy 5/7/2021  · 1/2 blood culture positive for staphylococcus epidermidis likely contamination  · Left lateral hip wound  · Left foot heel, dorsum and lateral ankle wounds  · Right heel and lateral ankle wounds  · Quadriplegia secondary to spinal cord injury  · Neurogenic bladder status post suprapubic catheter  · DVT on Eliquis  · History of subchronic hemorrhage    Recommendations   · IV vancomycin and cefepime discontinued 5/7/2021  · Augmentin until 5/11/2021  · Echocardiogram  showed no vegetations  · Urology changed suprapubic catheter 5/6/2021  · Follow final cultures no growth to date  · Follow CBC and renal function    History of Present Illness:   Initial history:  Sigifredo Mcintosh is a 23y.o.-year-old male quadriplegic secondary to spinal cord injury presented to the hospital with worsening chills and back pain for several days. He had multiple wounds to the coccyx, bilateral feet and ankles. He was tachycardic on admission, WBC 17  Sed rate and C-reactive protein elevated  Urinalysis showed moderate leukocyte esterase, 20-50 WBCs  CT abdomen and pelvis showed left gluteal fat stranding suggestive of cellulitis, no abscess, urinary bladder calculi, unchanged appearance of coccyx decubitus ulcer. COVID-19 rapid test was negative  Chest x-ray no acute process    Interval changes  5/8/2021   s/p diverging transverse loop colostomy yesterday. He is awake, not under distress, pain under control, no reported fever, denied nausea or vomiting, no new complaints  Colostomy not functioning yet.       Patient Vitals for the past 8 hrs:   BP Temp Temp src Pulse Resp SpO2   05/08/21 1215 136/73 97.8 °F (36.6 °C) Oral 85 16 99 %   05/08/21 0715 128/78 98.9 °F (37.2 °C) Oral 77 16 99 %           I have personally reviewed the past medical history, past surgical history, medications, social history, and family history, and I haveupdated the database accordingly. Allergies:   Patient has no known allergies. Review of Systems:     Review of Systems  As per history of present illness, other than above 12 system review was negative  Physical Examination :       Physical Exam  Constitutional:       General: He is not in acute distress. HENT:      Head: Normocephalic and atraumatic. Right Ear: External ear normal.      Left Ear: External ear normal.   Eyes:      General:         Left eye: No discharge. Conjunctiva/sclera: Conjunctivae normal.   Cardiovascular:      Rate and Rhythm: Normal rate and regular rhythm. Heart sounds: No murmur. Pulmonary:      Effort: Pulmonary effort is normal. No respiratory distress. Breath sounds: No wheezing. Abdominal:      General: There is no distension. Palpations: Abdomen is soft. Tenderness: There is no abdominal tenderness. Comments: Suprapubic catheter in place    Skin:     General: Skin is warm. Coloration: Skin is not jaundiced. Comments: Stage IV sacral decubitus ulcer with dressing not removed  Multiple wounds to the feet and lateral ankles dressing not removed  Left lateral hip wound dressing not removed   Neurological:      Mental Status: He is alert and oriented to person, place, and time.       Comments: Quadriplegia          Past Medical History:     Past Medical History:   Diagnosis Date    Major depressive disorder, recurrent severe without psychotic features ([facility] [address] 5/6/2021       Past Surgical  History:     Past Surgical History:   Procedure Laterality Date    CERVICAL FUSION N/A 12/1/2020    C7, CORPECTOMY WITH LUMBAR DRAIN PLACEMENT performed by Renato Montague DO at [facility] N/A 12/3/2020    EGD PEG TUBE PLACEMENT performed by Rabia Ibrahim MD at [address] [facility]  02/09/2021    MOUTH HARDWARE REMOVAL - HYBRID IMF    HARDWARE REMOVAL N/A 2/9/2021    MOUTH HARDWARE REMOVAL - HYBRID IMF performed by Leticia Saldana MD at [address]  12/14/2020    IR GASTROSTOMY TUBE PLACEMENT PERCUTANEOUS 12/14/2020 Alejandra Hodgkin, MD [facility] SPECIAL PROCEDURES    IR GASTROSTOMY TUBE PLACEMENT PERCUTANEOUS  12/15/2020    IR GASTROSTOMY TUBE PLACEMENT PERCUTANEOUS 12/15/2020 Alejandra Hodgkin, MD [facility] SPECIAL PROCEDURES    MANDIBLE FRACTURE SURGERY N/A 12/3/2020    HYBRID IMF EXTERNAL FIXATOR DEVICE MANDIBLE, SHARP EXCISIONAL DEBRIDEMENT, REPAIR OF COMPLEX WOUND RIGHT EYELID performed by Leticia Saldana MD at [address] [facility] N/A 5/5/2021    EXCISIONAL DEBRIDEMENT OF SACROCOCCYGEAL ULCER performed by Annmarie Estes MD at [address] N/A 5/7/2021    TRANSVERSE LOOP COLOSTOMY performed by Annmarie Estes MD at [address] [facility] N/A 12/3/2020    TRACHEOTOMY performed by Rabia Ibrahim MD at [address] [facility]  12/1/2020    EGD ESOPHAGOGASTRODUODENOSCOPY performed by Renato Montague DO at [facility] OR       Medications:      apixaban  5 mg Oral BID    amoxicillin-clavulanate  1 tablet Oral 3 times per day    gabapentin  300 mg Oral TID    sodium chloride flush  5-40 mL Intravenous 2 times per day    vitamin D3  400 Units Oral Daily    docusate sodium  100 mg Oral TID    famotidine  20 mg Oral Daily    melatonin  3 mg Oral Nightly    zonisamide  100 mg Oral Daily    fluticasone  1 spray Each Nostril Daily       Social History:     Social History     Socioeconomic History    Marital status: Single     Spouse name: Not on file    Number of children: Not on file    Years of education: Not on file    Highest education level: Not on file   Occupational History    Not on file   Social Needs    Financial resource strain: Not on file    Food insecurity     Worry: Not on file     Inability: Not on file    Transportation needs     Medical: Not on file     Non-medical: Not on file   Tobacco Use    Smoking status: Never Smoker    Smokeless tobacco: Never Used   Substance and Sexual Activity    Alcohol use: No    Drug use: Not Currently     Frequency: 1.0 times per week     Types: Marijuana     Comment: last time was late november 2020    Sexual activity: Not on file   Lifestyle    Physical activity     Days per week: Not on file     Minutes per session: Not on file    Stress: Not on file   Relationships    Social connections     Talks on phone: Not on file     Gets together: Not on file     Attends [employer] service: Not on file     Active member of club or organization: Not on file     Attends meetings of clubs or organizations: Not on file     Relationship status: Not on file    Intimate partner violence     Fear of current or ex partner: Not on file     Emotionally abused: Not on file     Physically abused: Not on file     Forced sexual activity: Not on file   Other Topics Concern    Not on file   Social History Narrative    ** Merged History Encounter **         Lives with dad, sister, and grandma. In 9th grade.        Family History:     Family History   Problem Relation Age of Onset    Asthma Paternal Uncle     High Blood Pressure Paternal Grandmother       Medical Decision Making:   I have independently reviewed/ordered the following labs:    CBC with Differential:   Recent Labs     05/07/21  0524   WBC 9.4   HGB 10.2*   HCT 32.4*   *     BMP:  Recent Labs     05/07/21  0524      K 3.7      CO2 22   BUN 5*   CREATININE <0.40*     Hepatic Function Panel:   No results for input(s): PROT, LABALBU, BILIDIR, IBILI,

## 2021-05-08 NOTE — PROGRESS NOTES
Patient is refusing to have blood work drawn today. RN talked to patient in an attempt to get him to allow Lab to draw blood, but patient adamantly refusing.

## 2021-05-09 LAB
CULTURE: NORMAL
Lab: NORMAL
SPECIMEN DESCRIPTION: NORMAL

## 2021-05-09 PROCEDURE — 6370000000 HC RX 637 (ALT 250 FOR IP): Performed by: SURGERY

## 2021-05-09 PROCEDURE — 99231 SBSQ HOSP IP/OBS SF/LOW 25: CPT | Performed by: INTERNAL MEDICINE

## 2021-05-09 PROCEDURE — 2060000000 HC ICU INTERMEDIATE R&B

## 2021-05-09 PROCEDURE — 6370000000 HC RX 637 (ALT 250 FOR IP): Performed by: STUDENT IN AN ORGANIZED HEALTH CARE EDUCATION/TRAINING PROGRAM

## 2021-05-09 PROCEDURE — 99232 SBSQ HOSP IP/OBS MODERATE 35: CPT | Performed by: INTERNAL MEDICINE

## 2021-05-09 PROCEDURE — 2580000003 HC RX 258: Performed by: SURGERY

## 2021-05-09 RX ADMIN — APIXABAN 5 MG: 5 TABLET, FILM COATED ORAL at 22:24

## 2021-05-09 RX ADMIN — AMOXICILLIN AND CLAVULANATE POTASSIUM 1 TABLET: 500; 125 TABLET, FILM COATED ORAL at 05:24

## 2021-05-09 RX ADMIN — GABAPENTIN 300 MG: 300 CAPSULE ORAL at 10:04

## 2021-05-09 RX ADMIN — SODIUM CHLORIDE: 9 INJECTION, SOLUTION INTRAVENOUS at 07:07

## 2021-05-09 RX ADMIN — ZONISAMIDE 100 MG: 100 CAPSULE ORAL at 10:08

## 2021-05-09 RX ADMIN — SODIUM CHLORIDE: 9 INJECTION, SOLUTION INTRAVENOUS at 17:00

## 2021-05-09 RX ADMIN — CHOLECALCIFEROL TAB 10 MCG (400 UNIT) 400 UNITS: 10 TAB at 10:05

## 2021-05-09 RX ADMIN — AMOXICILLIN AND CLAVULANATE POTASSIUM 1 TABLET: 500; 125 TABLET, FILM COATED ORAL at 22:24

## 2021-05-09 RX ADMIN — APIXABAN 5 MG: 5 TABLET, FILM COATED ORAL at 10:05

## 2021-05-09 RX ADMIN — AMOXICILLIN AND CLAVULANATE POTASSIUM 1 TABLET: 500; 125 TABLET, FILM COATED ORAL at 14:08

## 2021-05-09 RX ADMIN — GABAPENTIN 300 MG: 300 CAPSULE ORAL at 22:24

## 2021-05-09 NOTE — PROGRESS NOTES
[facility] -   Infectious diseases evaluation  admission date 5/2/2021    reason for consultation:   Coccyx wound  Impression :   Current:  · Necrotic stage IV coccyx ulcer with surrounding cellulitis status post excisional debridement  · Possible UTI, no growth on urine culture  · Sepsis secondary to above  · S/p proximal diverting transverse loop colostomy 5/7/2021  · 1/2 blood culture positive for staphylococcus epidermidis likely contamination  · Left lateral hip wound  · Left foot heel, dorsum and lateral ankle wounds  · Right heel and lateral ankle wounds  · Quadriplegia secondary to spinal cord injury  · Neurogenic bladder status post suprapubic catheter  · DVT on Eliquis  · History of subchronic hemorrhage    Recommendations   · IV vancomycin and cefepime discontinued 5/7/2021  · Augmentin until 5/11/2021  · Echocardiogram  showed no vegetations  · Urology changed suprapubic catheter 5/6/2021  · Follow final cultures no growth to date  · Follow CBC and renal function    History of Present Illness:   Initial history:  Raphael Archuleta is a 23y.o.-year-old male quadriplegic secondary to spinal cord injury presented to the hospital with worsening chills and back pain for several days. He had multiple wounds to the coccyx, bilateral feet and ankles. He was tachycardic on admission, WBC 17  Sed rate and C-reactive protein elevated  Urinalysis showed moderate leukocyte esterase, 20-50 WBCs  CT abdomen and pelvis showed left gluteal fat stranding suggestive of cellulitis, no abscess, urinary bladder calculi, unchanged appearance of coccyx decubitus ulcer. COVID-19 rapid test was negative  Chest x-ray no acute process    Interval changes  5/9/2021   s/p diverging transverse loop colostomy 5/7/2029.     He is awake, pain under control, denied fever or chills, denied nausea or vomiting, had bowel movement, no acute event      Patient Vitals for the past 8 hrs:   BP Temp Temp src Pulse Resp SpO2   05/09/21 0815 126/77 98.1 °F (36.7 °C) Oral 93 18 100 %           I have personally reviewed the past medical history, past surgical history, medications, social history, and family history, and I haveupdated the database accordingly. Allergies:   Patient has no known allergies. Review of Systems:     Review of Systems  As per history of present illness, other than above 12 system review was negative  Physical Examination :       Physical Exam  Constitutional:       General: He is not in acute distress. HENT:      Head: Normocephalic and atraumatic. Right Ear: External ear normal.      Left Ear: External ear normal.   Eyes:      General:         Left eye: No discharge. Conjunctiva/sclera: Conjunctivae normal.   Cardiovascular:      Rate and Rhythm: Normal rate and regular rhythm. Heart sounds: No murmur. Pulmonary:      Effort: Pulmonary effort is normal. No respiratory distress. Breath sounds: No wheezing. Abdominal:      General: There is no distension. Palpations: Abdomen is soft. Tenderness: There is no abdominal tenderness. Comments: Suprapubic catheter in place    Skin:     General: Skin is warm. Coloration: Skin is not jaundiced. Comments: Stage IV sacral decubitus ulcer with dressing not removed  Multiple wounds to the feet and lateral ankles dressing not removed  Left lateral hip wound dressing not removed   Neurological:      Mental Status: He is alert and oriented to person, place, and time.       Comments: Quadriplegia          Past Medical History:     Past Medical History:   Diagnosis Date    Major depressive disorder, recurrent severe without psychotic features ([facility] [address] 5/6/2021       Past Surgical  History:     Past Surgical History:   Procedure Laterality Date    CERVICAL FUSION N/A 12/1/2020    C7, CORPECTOMY WITH LUMBAR DRAIN PLACEMENT performed by Javier Doe DO at [address] 12/3/2020    EGD PEG TUBE PLACEMENT performed by Nakita Hays MD at [address] [facility] [address]  02/09/2021    MOUTH HARDWARE REMOVAL - HYBRID IMF    HARDWARE REMOVAL N/A 2/9/2021    MOUTH HARDWARE REMOVAL - HYBRID IMF performed by Racquel Valverde MD at [address]  12/14/2020    IR GASTROSTOMY TUBE PLACEMENT PERCUTANEOUS 12/14/2020 Renetta Hand MD [facility] SPECIAL PROCEDURES    IR GASTROSTOMY TUBE PLACEMENT PERCUTANEOUS  12/15/2020    IR GASTROSTOMY TUBE PLACEMENT PERCUTANEOUS 12/15/2020 Renetta Hand MD [facility] SPECIAL PROCEDURES    MANDIBLE FRACTURE SURGERY N/A 12/3/2020    HYBRID IMF EXTERNAL FIXATOR DEVICE MANDIBLE, SHARP EXCISIONAL DEBRIDEMENT, REPAIR OF COMPLEX WOUND RIGHT EYELID performed by Racquel Valverde MD at [address] [facility] N/A 5/5/2021    EXCISIONAL DEBRIDEMENT OF SACROCOCCYGEAL ULCER performed by Ruben Villegas MD at [address] [facility] N/A 5/7/2021    TRANSVERSE LOOP COLOSTOMY performed by Ruben Villegas MD at [facility] N/A 12/3/2020    TRACHEOTOMY performed by Nakita Hays MD at [address] [facility]  12/1/2020    EGD ESOPHAGOGASTRODUODENOSCOPY performed by Emmy Yee DO at [facility] OR       Medications:      apixaban  5 mg Oral BID    amoxicillin-clavulanate  1 tablet Oral 3 times per day    gabapentin  300 mg Oral TID    sodium chloride flush  5-40 mL Intravenous 2 times per day    vitamin D3  400 Units Oral Daily    docusate sodium  100 mg Oral TID    famotidine  20 mg Oral Daily    melatonin  3 mg Oral Nightly    zonisamide  100 mg Oral Daily    fluticasone  1 spray Each Nostril Daily       Social History:     Social History     Socioeconomic History    Marital status: Single     Spouse name: Not on file    Number of children: Not on file    Years of education: Not on file    Highest education level: Not on file   Occupational History    Not on file   Social Needs    Financial resource strain: Not on file    Food insecurity     Worry: Not on file     Inability: Not on file    Transportation needs     Medical: Not on file     Non-medical: Not on file   Tobacco Use    Smoking status: Never Smoker    Smokeless tobacco: Never Used   Substance and Sexual Activity    Alcohol use: No    Drug use: Not Currently     Frequency: 1.0 times per week     Types: Marijuana     Comment: last time was late november 2020    Sexual activity: Not on file   Lifestyle    Physical activity     Days per week: Not on file     Minutes per session: Not on file    Stress: Not on file   Relationships    Social connections     Talks on phone: Not on file     Gets together: Not on file     Attends Gnosticist service: Not on file     Active member of club or organization: Not on file     Attends meetings of clubs or organizations: Not on file     Relationship status: Not on file    Intimate partner violence     Fear of current or ex partner: Not on file     Emotionally abused: Not on file     Physically abused: Not on file     Forced sexual activity: Not on file   Other Topics Concern    Not on file   Social History Narrative    ** Merged History Encounter **         Lives with dad, sister, and grandma. In 9th grade. Family History:     Family History   Problem Relation Age of Onset    Asthma Paternal Uncle     High Blood Pressure Paternal Grandmother       Medical Decision Making:   I have independently reviewed/ordered the following labs:    CBC with Differential:   Recent Labs     05/07/21  0524   WBC 9.4   HGB 10.2*   HCT 32.4*   *     BMP:  Recent Labs     05/07/21  0524      K 3.7      CO2 22   BUN 5*   CREATININE <0.40*     Hepatic Function Panel:   No results for input(s): PROT, LABALBU, BILIDIR, IBILI, BILITOT, ALKPHOS, ALT, AST in the last 72 hours.   No results for input(s): RPR in the last 72 hours. No results for input(s): HIV in the last 72 hours. No results for input(s): BC in the last 72 hours. Lab Results   Component Value Date    CREATININE <0.40 05/07/2021    GLUCOSE 106 05/07/2021       Detailed results: Thank you for allowing us to participate in the care of this patient. Please call with questions. This note is created with the assistance of a speech recognition program.  While intending to generate adocument that actually reflects the content of the visit, the document can still have some errors including those of syntax and sound a like substitutions which may escape proof reading. It such instances, actual meaningcan be extrapolated by contextual diversion.       Doc Overall

## 2021-05-09 NOTE — PROGRESS NOTES
Surgery Progress Note             POD # 2    PATIENT NAME: Joana Bustillos     TODAY'S DATE: 5/9/2021, 11:57 AM    Chief complaint: s/p diverting loop colostomy    SUBJECTIVE:    Pt is fatigued and declining looking at his colostomy or wounds now. Patients pain is well controlled. Pt is  passing gas. Pt has had a bowel movement. OBJECTIVE:   VITALS:  /77   Pulse 93   Temp 98.1 °F (36.7 °C) (Oral)   Resp 18   Ht 5' 5\" (1.651 m)   Wt 142 lb 3.2 oz (64.5 kg)   SpO2 100%   BMI 23.66 kg/m²     INTAKE/OUTPUT:      Intake/Output Summary (Last 24 hours) at 5/9/2021 1157  Last data filed at 5/9/2021 1044  Gross per 24 hour   Intake 1620 ml   Output 2850 ml   Net -1230 ml                 CONSTITUTIONAL:  awake and alert.   No acute distress  CARDIOVASCULAR:  regular rate and rhythm and No Murmur  LUNGS:  CTA Bilaterally  ABDOMEN:   exam deferred  INCISION: exam deferred  Data:  CBC:   Lab Results   Component Value Date    WBC 9.4 05/07/2021    RBC 3.94 05/07/2021    HGB 10.2 05/07/2021    HCT 32.4 05/07/2021    MCV 82.1 05/07/2021    MCH 26.0 05/07/2021    MCHC 31.6 05/07/2021    RDW 18.1 05/07/2021     05/07/2021    MPV 6.9 05/07/2021     CMP:    Lab Results   Component Value Date     05/07/2021    K 3.7 05/07/2021     05/07/2021    CO2 22 05/07/2021    BUN 5 05/07/2021    CREATININE <0.40 05/07/2021    GFRAA CANNOT BE CALCULATED 05/07/2021    LABGLOM CANNOT BE CALCULATED 05/07/2021    GLUCOSE 106 05/07/2021    PROT 7.5 05/02/2021    LABALBU 3.0 05/02/2021    CALCIUM 8.3 05/07/2021    BILITOT 0.34 05/02/2021    ALKPHOS 120 05/02/2021    AST 14 05/02/2021    ALT 7 05/02/2021         ASSESSMENT     Hospital Problems           Last Modified POA    * (Principal) Cellulitis 5/3/2021 Yes    Pressure injury of coccygeal region, stage 3 (HCC) 5/3/2021 Yes    Chronic suprapubic catheter ([address] 5/3/2021 Yes    History of DVT (deep vein thrombosis) 5/3/2021 Yes    Chronic anticoagulation 5/3/2021 Yes

## 2021-05-09 NOTE — PROGRESS NOTES
05/07/21  1047   POCGLU 102        Ct Abdomen Pelvis W Iv Contrast Additional Contrast? None    Result Date: 5/3/2021  EXAMINATION: CT OF THE ABDOMEN AND PELVIS WITH CONTRAST 5/3/2021 12:43 am TECHNIQUE: CT of the abdomen and pelvis was performed with the administration of intravenous contrast. Multiplanar reformatted images are provided for review. Dose modulation, iterative reconstruction, and/or weight based adjustment of the mA/kV was utilized to reduce the radiation dose to as low as reasonably achievable. COMPARISON: CT abdomen and pelvis without contrast March 20, 2021. HISTORY: ORDERING SYSTEM PROVIDED HISTORY: decubitus, sepsis TECHNOLOGIST PROVIDED HISTORY: decubitus, sepsis Decision Support Exception - unselect if not a suspected or confirmed emergency medical condition->Emergency Medical Condition (MA) Reason for Exam: Decubitus ulcer, sepsis Acuity: Acute Type of Exam: Initial FINDINGS: Abdomen/Pelvis: Lower chest: The lung bases are well aerated. Pleural surfaces are unremarkable and no evidence of pleural effusion is identified. Organs: The liver, gallbladder, spleen, pancreas, adrenal glands, kidneys, are unremarkable in appearance. GI/Bowel: The stomach is unremarkable without wall thickening or distention. Bowel loops are unremarkable in appearance without evidence of obstruction, distension or mucosal thickening. Pelvis: Suprapubic Serrano catheter is noted in place with mild distention of the urinary bladder. Dependent density is seen within the urinary bladder lumen suggestive of small calculi. No evidence of pelvic free fluid is seen. Peritoneum/Retroperitoneum: No evidence of retroperitoneal or intraperitoneal lymphadenopathy is identified. No evidence of intraperitoneal free fluid is seen. Bones/Soft Tissues: Left gluteal subcutaneous fat stranding is present. Persisting decubitus ulceration is seen overlying the coccyx posteriorly.  The bones, skeletal muscle bundles, fascial planes and subcutaneous soft tissues are unremarkable in appearance. 1. Left gluteal fat stranding suggesting presence of cellulitis. 2. No evidence of organized soft tissue abscess. 3. Suspected multifocal small dependent calculi/gravel within the urinary bladder lumen in setting of suprapubic Serrano catheter. 4. Unchanged appearance of decubitus ulceration at the posterior margin of the coccyx. Xr Chest Portable    Result Date: 5/2/2021  EXAMINATION: ONE XRAY VIEW OF THE CHEST 5/2/2021 10:41 pm COMPARISON: 03/19/2021 HISTORY: ORDERING SYSTEM PROVIDED HISTORY: tachycardia TECHNOLOGIST PROVIDED HISTORY: tachycardia Reason for Exam: tachycardia   pt refused to lower has hands any further Acuity: Acute Type of Exam: Initial Additional signs and symptoms: tachycardia   pt refused to lower has hands any further Relevant Medical/Surgical History: tachycardia   pt refused to lower has hands any further FINDINGS: Chest radiograph: The cardiomediastinal silhouette and hilar contours are normal. The lungs are clear with no focal consolidation, pleural effusion or pneumothorax. The overlying soft tissue and osseous structures do not demonstrate acute abnormality. No acute intrathoracic pathology. HPI:   See history in H and P      Review of Systems:     Constitutional:  negative for chills, fevers, sweats  Respiratory:  negative for cough, dyspnea on exertion, hemoptysis, shortness of breath, wheezing  Cardiovascular:  negative for chest pain, chest pressure/discomfort, lower extremity edema, palpitations  Gastrointestinal:  negative for abdominal pain, constipation, diarrhea, nausea, vomiting  Neurological: Patient is paraplegic, complaining of generalized body ache especially in right arm  Data:     Past Medical History:  no change     Social History:  no change    Family History: @no change    Vitals:      I/O (24Hr):     Intake/Output Summary (Last 24 hours) at 5/9/2021 1637  Last data filed at 5/9/2021 underwent Débridement , underwent diversion colostomy, on Augmentin  Echo negative for vegetation  Complete paraplegia, has multiple pressure sores, wound care following  Purulent secretion around PEG tube site, concern for possible UTI, patient was admitted in [facility] in month of March with UTI, urine culture is negative . Catheter changed on 5/6  Patient has neuropathic pain, started Cymbalta,    6 Patient has history of DVT, on Eliquis  7 . Awaiting colostomy to function.     5/9   Patient colostomy is working,   Working on placement to nursing facility  No new complaints on antibiotics  Kael Pastrana MD  5/9/2021  4:37 PM
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
## 2021-05-10 VITALS
SYSTOLIC BLOOD PRESSURE: 101 MMHG | HEIGHT: 65 IN | OXYGEN SATURATION: 100 % | RESPIRATION RATE: 18 BRPM | HEART RATE: 104 BPM | BODY MASS INDEX: 24.61 KG/M2 | DIASTOLIC BLOOD PRESSURE: 55 MMHG | WEIGHT: 147.71 LBS | TEMPERATURE: 98.2 F

## 2021-05-10 LAB
ANION GAP SERPL CALCULATED.3IONS-SCNC: 11 MMOL/L (ref 9–17)
BUN BLDV-MCNC: 5 MG/DL (ref 6–20)
BUN/CREAT BLD: ABNORMAL (ref 9–20)
CALCIUM SERPL-MCNC: 9 MG/DL (ref 8.6–10.4)
CHLORIDE BLD-SCNC: 106 MMOL/L (ref 98–107)
CO2: 21 MMOL/L (ref 20–31)
CREAT SERPL-MCNC: <0.4 MG/DL (ref 0.7–1.2)
GFR AFRICAN AMERICAN: ABNORMAL ML/MIN
GFR NON-AFRICAN AMERICAN: ABNORMAL ML/MIN
GFR SERPL CREATININE-BSD FRML MDRD: ABNORMAL ML/MIN/{1.73_M2}
GFR SERPL CREATININE-BSD FRML MDRD: ABNORMAL ML/MIN/{1.73_M2}
GLUCOSE BLD-MCNC: 100 MG/DL (ref 70–99)
HCT VFR BLD CALC: 33.8 % (ref 41–53)
HEMOGLOBIN: 10.6 G/DL (ref 13.5–17.5)
MAGNESIUM: 1.9 MG/DL (ref 1.6–2.6)
MCH RBC QN AUTO: 26 PG (ref 26–34)
MCHC RBC AUTO-ENTMCNC: 31.2 G/DL (ref 31–37)
MCV RBC AUTO: 83.2 FL (ref 80–100)
NRBC AUTOMATED: ABNORMAL PER 100 WBC
PDW BLD-RTO: 18.3 % (ref 11.5–14.9)
PLATELET # BLD: 495 K/UL (ref 150–450)
PMV BLD AUTO: 6.6 FL (ref 6–12)
POTASSIUM SERPL-SCNC: 3.5 MMOL/L (ref 3.7–5.3)
RBC # BLD: 4.07 M/UL (ref 4.5–5.9)
SODIUM BLD-SCNC: 138 MMOL/L (ref 135–144)
WBC # BLD: 7.8 K/UL (ref 4.5–13.5)

## 2021-05-10 PROCEDURE — 99239 HOSP IP/OBS DSCHRG MGMT >30: CPT | Performed by: INTERNAL MEDICINE

## 2021-05-10 PROCEDURE — 6370000000 HC RX 637 (ALT 250 FOR IP): Performed by: SURGERY

## 2021-05-10 PROCEDURE — 6370000000 HC RX 637 (ALT 250 FOR IP): Performed by: STUDENT IN AN ORGANIZED HEALTH CARE EDUCATION/TRAINING PROGRAM

## 2021-05-10 PROCEDURE — 80048 BASIC METABOLIC PNL TOTAL CA: CPT

## 2021-05-10 PROCEDURE — 85027 COMPLETE CBC AUTOMATED: CPT

## 2021-05-10 PROCEDURE — 83735 ASSAY OF MAGNESIUM: CPT

## 2021-05-10 PROCEDURE — 99231 SBSQ HOSP IP/OBS SF/LOW 25: CPT | Performed by: INTERNAL MEDICINE

## 2021-05-10 PROCEDURE — 36415 COLL VENOUS BLD VENIPUNCTURE: CPT

## 2021-05-10 PROCEDURE — 2580000003 HC RX 258: Performed by: SURGERY

## 2021-05-10 RX ORDER — AMOXICILLIN AND CLAVULANATE POTASSIUM 500; 125 MG/1; MG/1
1 TABLET, FILM COATED ORAL EVERY 8 HOURS SCHEDULED
Qty: 4 TABLET | Refills: 0 | Status: SHIPPED | OUTPATIENT
Start: 2021-05-10 | End: 2021-05-12

## 2021-05-10 RX ORDER — GABAPENTIN 300 MG/1
300 CAPSULE ORAL 3 TIMES DAILY
Qty: 15 CAPSULE | Refills: 0 | Status: SHIPPED | OUTPATIENT
Start: 2021-05-10 | End: 2021-05-15

## 2021-05-10 RX ADMIN — GABAPENTIN 300 MG: 300 CAPSULE ORAL at 15:33

## 2021-05-10 RX ADMIN — POTASSIUM CHLORIDE 40 MEQ: 1500 TABLET, EXTENDED RELEASE ORAL at 15:32

## 2021-05-10 RX ADMIN — ZONISAMIDE 100 MG: 100 CAPSULE ORAL at 09:58

## 2021-05-10 RX ADMIN — APIXABAN 5 MG: 5 TABLET, FILM COATED ORAL at 09:57

## 2021-05-10 RX ADMIN — AMOXICILLIN AND CLAVULANATE POTASSIUM 1 TABLET: 500; 125 TABLET, FILM COATED ORAL at 06:30

## 2021-05-10 RX ADMIN — CHOLECALCIFEROL TAB 10 MCG (400 UNIT) 400 UNITS: 10 TAB at 09:57

## 2021-05-10 RX ADMIN — GABAPENTIN 300 MG: 300 CAPSULE ORAL at 09:57

## 2021-05-10 RX ADMIN — AMOXICILLIN AND CLAVULANATE POTASSIUM 1 TABLET: 500; 125 TABLET, FILM COATED ORAL at 16:43

## 2021-05-10 NOTE — DISCHARGE SUMMARY
wound  · Left foot heel, dorsum and lateral ankle wounds  · Right heel and lateral ankle wounds  · Quadriplegia secondary to spinal cord injury  · Neurogenic bladder status post suprapubic catheter  · DVT on Eliquis  · History of subchronic hemorrhage    Status post decubitus ulcer debridement local wound care diverting loop colostomy to help heal the sacral decubitus ulcer which was getting contaminated by stool  Patient being discharged today to nursing home  Significant therapeutic interventions:     Significant Diagnostic Studies:   Labs / Micro:        ,     Radiology:    Echo Complete 2d W Doppler W Color    Result Date: 5/4/2021  [address] [facility] Transthoracic Echocardiography Report (TTE)  Patient Name Holland Ibarra  Date of Study                 05/04/2021               L   Date of      2000  Gender                        Male  Birth   Age          23 year(s)  Race                          Black   Room Number  2091        Height:                       65 inch, 165.1 cm   Corporate ID [id number]    Weight:                       150 pounds, 68 kg  #   Patient Acct [de-identified]   BSA:           1.75 m^2       BMI:      24.96  #                                                                kg/m^2   MR #         [id number]      Sonographer                   Deanna Schmidt   Accession #  [id number]  Interpreting Physician        [address] [place] [address]   Fellow                   Referring Nurse Practitioner   Interpreting             Referring Physician           Reji Haque  Fellow  Type of Study   TTE procedure:2D Echocardiogram, M-Mode, Doppler, Color Doppler. Procedure Date Date: 05/04/2021 Start: 02:04 PM Study Location: [address] [place] [address] Technical Quality: Fair visualization Indications:Positive blood cultures.  Patient Status: Inpatient Height: 65 inches Weight: 150 pounds BSA: 1.75 m^2 BMI: 24.96 kg/m^2 Rhythm: Sinus tachycardia HR: 104 bpm BP: 124/79 mmHg CONCLUSIONS Summary Left ventricle is normal LA volume/Index: 27.6 ml /16m^2  Calculated LVEF (%): 72.14 %     LVOT:2.1 cm   Mitral:                                Aortic   Peak E-Wave: 0.67 m/s                  Peak Velocity: 1.05 m/s  Peak A-Wave: 0.50 m/s                  Mean Velocity: 0.77 m/s  E/A Ratio: 1.34                        Peak Gradient: 4.41 mmHg  Peak Gradient: 1.77 mmHg               Mean Gradient: 3 mmHg  Deceleration Time: 127 msec                                          Area (continuity): 3.02 cm^2                                         AV VTI: 18.6 cm   Tricuspid:                             Pulmonic:   Estimated RVSP: 6 mmHg  Peak TR Velocity: 0.92 m/s  Peak TR Gradient: 3.494147 mmHg  Estimated RA Pressure: 3 mmHg                                         Estimated PASP: 6.41 mmHg  Septal Wall E' velocity:0.12 m/s Lateral Wall E' velocity:0.17 m/s    Ct Abdomen Pelvis W Iv Contrast Additional Contrast? None    Result Date: 5/3/2021  EXAMINATION: CT OF THE ABDOMEN AND PELVIS WITH CONTRAST 5/3/2021 12:43 am TECHNIQUE: CT of the abdomen and pelvis was performed with the administration of intravenous contrast. Multiplanar reformatted images are provided for review. Dose modulation, iterative reconstruction, and/or weight based adjustment of the mA/kV was utilized to reduce the radiation dose to as low as reasonably achievable. COMPARISON: CT abdomen and pelvis without contrast March 20, 2021. HISTORY: ORDERING SYSTEM PROVIDED HISTORY: decubitus, sepsis TECHNOLOGIST PROVIDED HISTORY: decubitus, sepsis Decision Support Exception - unselect if not a suspected or confirmed emergency medical condition->Emergency Medical Condition (MA) Reason for Exam: Decubitus ulcer, sepsis Acuity: Acute Type of Exam: Initial FINDINGS: Abdomen/Pelvis: Lower chest: The lung bases are well aerated. Pleural surfaces are unremarkable and no evidence of pleural effusion is identified. Organs:  The liver, gallbladder, spleen, pancreas, adrenal glands, kidneys, are unremarkable in appearance. GI/Bowel: The stomach is unremarkable without wall thickening or distention. Bowel loops are unremarkable in appearance without evidence of obstruction, distension or mucosal thickening. Pelvis: Suprapubic Serrano catheter is noted in place with mild distention of the urinary bladder. Dependent density is seen within the urinary bladder lumen suggestive of small calculi. No evidence of pelvic free fluid is seen. Peritoneum/Retroperitoneum: No evidence of retroperitoneal or intraperitoneal lymphadenopathy is identified. No evidence of intraperitoneal free fluid is seen. Bones/Soft Tissues: Left gluteal subcutaneous fat stranding is present. Persisting decubitus ulceration is seen overlying the coccyx posteriorly. The bones, skeletal muscle bundles, fascial planes and subcutaneous soft tissues are unremarkable in appearance. 1. Left gluteal fat stranding suggesting presence of cellulitis. 2. No evidence of organized soft tissue abscess. 3. Suspected multifocal small dependent calculi/gravel within the urinary bladder lumen in setting of suprapubic Serrano catheter. 4. Unchanged appearance of decubitus ulceration at the posterior margin of the coccyx. Xr Chest Portable    Result Date: 5/2/2021  EXAMINATION: ONE XRAY VIEW OF THE CHEST 5/2/2021 10:41 pm COMPARISON: 03/19/2021 HISTORY: ORDERING SYSTEM PROVIDED HISTORY: tachycardia TECHNOLOGIST PROVIDED HISTORY: tachycardia Reason for Exam: tachycardia   pt refused to lower has hands any further Acuity: Acute Type of Exam: Initial Additional signs and symptoms: tachycardia   pt refused to lower has hands any further Relevant Medical/Surgical History: tachycardia   pt refused to lower has hands any further FINDINGS: Chest radiograph: The cardiomediastinal silhouette and hilar contours are normal. The lungs are clear with no focal consolidation, pleural effusion or pneumothorax.  The overlying soft tissue and osseous structures do not demonstrate acute abnormality. No acute intrathoracic pathology. Consultations:    Consults:     Final Specialist Recommendations/Findings:   PHARMACY TO DOSE VANCOMYCIN  IP CONSULT TO SOCIAL WORK  IP CONSULT TO DIETITIAN  IP CONSULT TO GENERAL SURGERY  IP CONSULT TO INFECTIOUS DISEASES  IP CONSULT TO UROLOGY  IP CONSULT TO DIETITIAN  IP CONSULT TO DIETITIAN  IP CONSULT TO PSYCHIATRY      The patient was seen and examined on day of discharge and this discharge summary is in conjunction with any daily progress note from day of discharge.     Discharge plan:     Disposition: f    Physician Follow Up:   Dr Kwame Villeags ,ivette  Wound care   Stoma care  Jacklyn Mooney [facility]  [phone]           Requiring Further Evaluation/Follow Up POST HOSPITALIZATION/Incidental Findings:    Diet: cardiac diet    Activity: As tolerated    Instructions to Patient:     Discharge Medications:      Medication List      START taking these medications    amoxicillin-clavulanate 500-125 MG per tablet  Commonly known as: AUGMENTIN  Take 1 tablet by mouth every 8 hours for 2 days        CHANGE how you take these medications    apixaban starter pack 5 MG Tbpk tablet  Commonly known as: Eliquis DVT/PE Starter Pack  Take 1 tablet by mouth See Admin Instructions  What changed: when to take this        CONTINUE taking these medications    acetaminophen 650 MG/20.3ML Susp  Commonly known as: TYLENOL     albuterol (2.5 MG/3ML) 0.083% nebulizer solution  Commonly known as: PROVENTIL  Take 3 mLs by nebulization every 4 hours as needed for Wheezing     aluminum & magnesium hydroxide-simethicone 200-200-20 MG/5ML Susp suspension  Commonly known as: MAALOX     ciprofloxacin 0.3 % ophthalmic solution  Commonly known as: CILOXAN     diclofenac sodium 1 % Gel  Commonly known as: VOLTAREN     docusate sodium 100 MG capsule  Commonly known as: COLACE     DULoxetine 60 MG extended release capsule  Commonly known as: CYMBALTA     famotidine 20 MG tablet  Commonly known as: PEPCID     fluticasone 50 MCG/ACT nasal spray  Commonly known as: FLONASE     gabapentin 300 MG capsule  Commonly known as: NEURONTIN  Take 1 capsule by mouth 3 times daily for 5 days. TAKE 2 PILLS THREE TIMES DAILY     GLUCAGEN IJ     guaiFENesin 100 MG/5ML Soln oral solution  Commonly known as: ROBITUSSIN     melatonin 3 MG Tabs tablet     meloxicam 7.5 MG tablet  Commonly known as: MOBIC     miconazole nitrate 2 % Oint     naloxone 0.4 MG/ML injection  Commonly known as: NARCAN     ondansetron 4 MG tablet  Commonly known as: ZOFRAN     polyethylene glycol 17 g packet  Commonly known as: GLYCOLAX     polyvinyl alcohol 1.4 % ophthalmic solution  Commonly known as: LIQUIFILM TEARS     QUEtiapine 25 MG tablet  Commonly known as: SEROQUEL     sodium chloride 0.65 % nasal spray  Commonly known as: OCEAN, BABY AYR     VITAMIN D3 PO     zonisamide 100 MG capsule  Commonly known as: ZONEGRAN        STOP taking these medications    LORazepam 0.5 MG tablet  Commonly known as: ATIVAN     oxyCODONE-acetaminophen  MG per tablet  Commonly known as: PERCOCET     predniSONE 20 MG tablet  Commonly known as: Bennett Busing           Where to Get Your Medications      These medications were sent to [facility], [address] [facility], [place] [address]    Phone: [phone]   · amoxicillin-clavulanate 500-125 MG per tablet     You can get these medications from any pharmacy    Bring a paper prescription for each of these medications  · gabapentin 300 MG capsule         Time Spent on discharge is  35 mins in patient examination, evaluation, counseling as well as medication reconciliation, prescriptions for required medications, discharge plan and follow up.     Electronically signed by   Erick Reis MD  5/10/2021  9:07 AM      Thank you Dr. Naga Delaney MD for the opportunity to be

## 2021-05-10 NOTE — PLAN OF CARE
Problem: Falls - Risk of:  Goal: Will remain free from falls  Description: Will remain free from falls  Outcome: Ongoing  Note: Pt is bedrest and does not try getting out of bed. Remains free from falls, will monitor      Problem: Falls - Risk of:  Goal: Absence of physical injury  Description: Absence of physical injury  Outcome: Ongoing  Note: Pt remains free from any physical injury this RN's shift. Will monitor      Problem: Skin Integrity:  Goal: Will show no infection signs and symptoms  Description: Will show no infection signs and symptoms  Outcome: Ongoing  Note: Pt shows no signs or symptoms of infection at this time. VS stable, alert and oriented x4. Hand hygiene utilized upon entry and exit. Will continue to monitor.      Problem: Pain:  Goal: Pain level will decrease  Description: Pain level will decrease  Outcome: Ongoing  Note: Pt has not complained of any pain this RN's shift

## 2021-05-10 NOTE — PROGRESS NOTES
[facility]    Date: 5/10/2021  Patient Name: Raya Eagle        : 2000       [x] Pt Refusal       5-10 pt denies need for OT, noting no change in UE. Pt notes needing assist with feeding here and at home due to shaking with LUE. Pt was observed to use LUE braced against his chest and using phone.        [] Pt Unavailable due to:           Thalia Ramos OTR/L Date: 5/10/2021

## 2021-05-10 NOTE — CARE COORDINATION
GERMAINE learned that this patient would be able to DC on this date. GERMAINE went to talk with the patient and GERMAINE informed him that [facility] could not accept him but [facility]  That we had also talked about was able to accept him. He said no that he does not want to go to [facility] because it is too far away. GERMAINE informed him that GERMAINE was not sure there was anything any closer except [facility]. The patient looked at where this facility was located and he reported that this place would be better as far as distance. GERMAINE verified that he wanted to go to [facility]. He reported that he did want that facility. GERMAINE spoke with Eugenio in admissions and then faxed the referral.  Eugenio originally said that she could accept him. Then she came back and reported that she could not accept him because of some issue with his Medicaid. GERMAINE checked with Oliverio corey in the HELP program and she reported that there are no issues, he had active Medicaid. GERMAINE called Eugen[facility] with [facility] and asked her to look into it again as this GERMAINE found that his Medicaid is active. She checked again and found that he in fact does have active Medicaid. She was then back to accepting the patient. GERMAINE faxed documentation to [facility] to get transport and GERMAINE ended up with a 6:00 pm  time via stretcher by Alexey Rojas. EGRMAINE spoke with the patient again and he called his sister while GERMAINE was in the room, GERMAINE did speak with his sister Lopez Clinton. GERMAINE faxed the Level of Care to the facility as well as the 7000 form. GERMAINE also faxed the DC orders to that facility. The number for report is [phone]. GERMAINE did provide this to [employer] .

## 2021-05-10 NOTE — PROGRESS NOTES
Rx for gabapentin placed in chart. Per MAR, patient has not been taking percocet or ativan.  Electronically signed by Darryle How, RN on 5/10/2021 at 9:09 AM

## 2021-05-10 NOTE — PROGRESS NOTES
[facility]                 PATIENT NAME: Bird Vinson     TODAY'S DATE: 5/10/2021, 10:48 AM    SUBJECTIVE:  POD#3  Pt seen and examined. Afebrile, VSS. Patient is doing well. Denies abdominal pain. Colostomy is functioning; stool in pouch. Stoma is pink, moist, viable. Tolerating diet, no N/V. Sacral wound clean, stable. OBJECTIVE:   VITALS:  /85   Pulse 102   Temp 98.2 °F (36.8 °C) (Oral)   Resp 17   Ht 5' 5\" (1.651 m)   Wt 147 lb 11.3 oz (67 kg)   SpO2 99%   BMI 24.58 kg/m²      INTAKE/OUTPUT:      Intake/Output Summary (Last 24 hours) at 5/10/2021 1048  Last data filed at 5/10/2021 0940  Gross per 24 hour   Intake 260 ml   Output 3900 ml   Net -3640 ml                 CONSTITUTIONAL:  awake and alert.   No acute distress  HEART:   RRR  LUNGS:   Decreased air entry at bases  ABDOMEN:   Abdomen soft, non-tender, non-distended  EXTREMITIES:   No pedal edema    Data:  CBC:   Lab Results   Component Value Date    WBC 7.8 05/10/2021    RBC 4.07 05/10/2021    HGB 10.6 05/10/2021    HCT 33.8 05/10/2021    MCV 83.2 05/10/2021    MCH 26.0 05/10/2021    MCHC 31.2 05/10/2021    RDW 18.3 05/10/2021     05/10/2021    MPV 6.6 05/10/2021     BMP:    Lab Results   Component Value Date     05/10/2021    K 3.5 05/10/2021     05/10/2021    CO2 21 05/10/2021    BUN 5 05/10/2021    LABALBU 3.0 05/02/2021    CREATININE <0.40 05/10/2021    CALCIUM 9.0 05/10/2021    GFRAA CANNOT BE CALCULATED 05/10/2021    LABGLOM CANNOT BE CALCULATED 05/10/2021    GLUCOSE 100 05/10/2021       Radiology Review:  No new images to review      ASSESSMENT     Principal Problem:    Cellulitis  Active Problems:    Pressure injury of coccygeal region, stage 3 (HCC)    Chronic suprapubic catheter (HCC)    History of DVT (deep vein thrombosis)    Chronic anticoagulation    MRSA bacteremia    Major depressive disorder, recurrent severe without psychotic features ([facility]  Resolved Problems: * No resolved hospital problems. *      Plan  1. Diet as tolerated  2. Colostomy functioning  3. Continue local wound care to sacrum  4. Antibiotics per ID  5. Surgically stable  6. Continue medical management   7. Anticipate discharge  8. Discussed with Soy Higuera. Patient is tolerating diet. Colostomy is functioning. Decubitus is stable. Awaiting discharge.       Electronically signed by Mindy Valverde PA-C  [id number] [address]

## 2021-05-10 NOTE — PROGRESS NOTES
Debbie [facility] notified patient has discharge orders.  Electronically signed by Greg Sanchez RN on 5/10/2021 at 9:11 AM

## 2021-05-10 NOTE — PROGRESS NOTES
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 status post excisional debridement  · Possible UTI, no growth on urine culture  · Sepsis secondary to above  · S/p proximal diverting transverse loop colostomy 5/7/2021  · 1/2 blood culture positive for staphylococcus epidermidis likely contamination  · Left lateral hip wound  · Left foot heel, dorsum and lateral ankle wounds  · Right heel and lateral ankle wounds  · Quadriplegia secondary to spinal cord injury  · Neurogenic bladder status post suprapubic catheter  · DVT on Eliquis  · History of subchronic hemorrhage    Recommendations   · IV vancomycin and cefepime discontinued 5/7/2021  · Augmentin until 5/11/2021  · Echocardiogram  showed no vegetations  · Urology changed suprapubic catheter 5/6/2021  · Follow CBC and renal function    History of Present Illness:   Initial history:  Gavin Vicente is a 23y.o.-year-old male quadriplegic secondary to spinal cord injury presented to the hospital with worsening chills and back pain for several days. He had multiple wounds to the coccyx, bilateral feet and ankles. He was tachycardic on admission, WBC 17  Sed rate and C-reactive protein elevated  Urinalysis showed moderate leukocyte esterase, 20-50 WBCs  CT abdomen and pelvis showed left gluteal fat stranding suggestive of cellulitis, no abscess, urinary bladder calculi, unchanged appearance of coccyx decubitus ulcer. COVID-19 rapid test was negative  Chest x-ray no acute process    Interval changes  5/10/2021   s/p diverging transverse loop colostomy 5/7/2029. He is sleeping, was not disturbed, no reported fever, no acute events.       Patient Vitals for the past 8 hrs:   BP Temp Temp src Pulse Resp SpO2   05/10/21 0730 128/85 98.2 °F (36.8 °C) Oral 102 17 99 % I have personally reviewed the past medical history, past surgical history, medications, social history, and family history, and I haveupdated the database accordingly. Allergies:   Patient has no known allergies. Review of Systems:     Review of Systems  Sleeping, was not disturbed, not obtained  Physical Examination :       Physical Exam  Constitutional:       General: He is not in acute distress. HENT:      Head: Normocephalic and atraumatic. Right Ear: External ear normal.      Left Ear: External ear normal.   Cardiovascular:      Rate and Rhythm: Normal rate and regular rhythm. Heart sounds: No murmur. Pulmonary:      Effort: Pulmonary effort is normal. No respiratory distress. Breath sounds: No wheezing. Abdominal:      General: There is no distension. Palpations: Abdomen is soft. Comments: Suprapubic catheter in place    Skin:     General: Skin is warm. Coloration: Skin is not jaundiced.       Comments: Stage IV sacral decubitus ulcer with dressing not removed  Multiple wounds to the feet and lateral ankles dressing not removed  Left lateral hip wound dressing not removed          Past Medical History:     Past Medical History:   Diagnosis Date    Major depressive disorder, recurrent severe without psychotic features ([facility] [address] 5/6/2021       Past Surgical  History:     Past Surgical History:   Procedure Laterality Date    CERVICAL FUSION N/A 12/1/2020    C7, CORPECTOMY WITH LUMBAR DRAIN PLACEMENT performed by Vernell Palm DO at [facility] N/A 12/3/2020    EGD PEG TUBE PLACEMENT performed by Joe Acosta MD at [address] [place] [address]  02/09/2021    MOUTH HARDWARE REMOVAL - HYBRID IMF    HARDWARE REMOVAL N/A 2/9/2021    MOUTH HARDWARE REMOVAL - HYBRID IMF performed by Ramses Morin MD at [place] [address]  12/14/2020     Frances Farmer PERCUTANEOUS 12/14/2020 James Alfaro MD [facility] SPECIAL PROCEDURES    IR GASTROSTOMY TUBE PLACEMENT PERCUTANEOUS  12/15/2020    IR GASTROSTOMY TUBE PLACEMENT PERCUTANEOUS 12/15/2020 James Alfaro MD [facility] SPECIAL PROCEDURES    MANDIBLE FRACTURE SURGERY N/A 12/3/2020    HYBRID IMF EXTERNAL FIXATOR DEVICE MANDIBLE, SHARP EXCISIONAL DEBRIDEMENT, REPAIR OF COMPLEX WOUND RIGHT EYELID performed by Una Hu MD at [address] [facility] N/A 5/5/2021    EXCISIONAL DEBRIDEMENT OF SACROCOCCYGEAL ULCER performed by Charmaine Garza MD at [address] [address] N/A 5/7/2021    TRANSVERSE LOOP COLOSTOMY [facility] N/A 12/3/2020    TRACHEOTOMY performed by Jessica Morocho MD at [address] [facility]  12/1/2020    EGD ESOPHAGOGASTRODUODENOSCOPY performed by Zonia Romano DO at [facility] OR       Medications:      apixaban  5 mg Oral BID    amoxicillin-clavulanate  1 tablet Oral 3 times per day    gabapentin  300 mg Oral TID    sodium chloride flush  5-40 mL Intravenous 2 times per day    vitamin D3  400 Units Oral Daily    docusate sodium  100 mg Oral TID    famotidine  20 mg Oral Daily    melatonin  3 mg Oral Nightly    zonisamide  100 mg Oral Daily    fluticasone  1 spray Each Nostril Daily       Social History:     Social History     Socioeconomic History    Marital status: Single     Spouse name: Not on file    Number of children: Not on file    Years of education: Not on file    Highest education level: Not on file   Occupational History    Not on file   Social Needs    Financial resource strain: Not on file    Food insecurity     Worry: Not on file     Inability: Not on file    Transportation needs     Medical: Not on file     Non-medical: Not on file   Tobacco Use    Smoking status: Never Smoker    Smokeless tobacco: Never Used   Substance and Sexual Activity    Alcohol use: No    Drug use: Not Currently     Frequency: 1.0 times per week     Types: Marijuana     Comment: last time was late november 2020    Sexual activity: Not on file   Lifestyle    Physical activity     Days per week: Not on file     Minutes per session: Not on file    Stress: Not on file   Relationships    Social connections     Talks on phone: Not on file     Gets together: Not on file     Attends [employer] service: Not on file     Active member of club or organization: Not on file     Attends meetings of clubs or organizations: Not on file     Relationship status: Not on file    Intimate partner violence     Fear of current or ex partner: Not on file     Emotionally abused: Not on file     Physically abused: Not on file     Forced sexual activity: Not on file   Other Topics Concern    Not on file   Social History Narrative    ** Merged History Encounter **         Lives with dad, sister, and grandma. In 9th grade. Family History:     Family History   Problem Relation Age of Onset    Asthma Paternal Uncle     High Blood Pressure Paternal Grandmother       Medical Decision Making:   I have independently reviewed/ordered the following labs:    CBC with Differential:   Recent Labs     05/10/21  0604   WBC 7.8   HGB 10.6*   HCT 33.8*   *     BMP:  Recent Labs     05/10/21  0604      K 3.5*      CO2 21   BUN 5*   CREATININE <0.40*   MG 1.9     Hepatic Function Panel:   No results for input(s): PROT, LABALBU, BILIDIR, IBILI, BILITOT, ALKPHOS, ALT, AST in the last 72 hours. No results for input(s): RPR in the last 72 hours. No results for input(s): HIV in the last 72 hours. No results for input(s): BC in the last 72 hours. Lab Results   Component Value Date    CREATININE <0.40 05/10/2021    GLUCOSE 100 05/10/2021       Detailed results: Thank you for allowing us to participate in the care of this patient. Please call with questions.     This note is created with the assistance of a speech recognition program.  While intending to generate adocument that actually reflects the content of the visit, the document can still have some errors including those of syntax and sound a like substitutions which may escape proof reading. It such instances, actual meaningcan be extrapolated by contextual diversion.       Fernando Rocha

## 2021-05-17 ENCOUNTER — HOSPITAL ENCOUNTER (OUTPATIENT)
Age: 21
Setting detail: SPECIMEN
Discharge: HOME | End: 2021-05-17
Payer: MEDICAID

## 2021-05-17 LAB
ABSOLUTE EOS #: 0.23 K/UL (ref 0–0.44)
ABSOLUTE IMMATURE GRANULOCYTE: 0.07 K/UL (ref 0–0.3)
ABSOLUTE LYMPH #: 2.54 K/UL (ref 1.1–3.7)
ABSOLUTE MONO #: 1.02 K/UL (ref 0.1–1.4)
ALBUMIN SERPL-MCNC: 3.1 G/DL (ref 3.5–5.2)
ALBUMIN/GLOBULIN RATIO: 0.8 (ref 1–2.5)
ALP BLD-CCNC: 130 U/L (ref 40–129)
ALT SERPL-CCNC: 11 U/L (ref 5–41)
ANION GAP SERPL CALCULATED.3IONS-SCNC: 14 MMOL/L (ref 9–17)
AST SERPL-CCNC: 12 U/L
BASOPHILS # BLD: 1 % (ref 0–2)
BASOPHILS ABSOLUTE: 0.08 K/UL (ref 0–0.2)
BILIRUB SERPL-MCNC: <0.1 MG/DL (ref 0.3–1.2)
BUN BLDV-MCNC: 8 MG/DL (ref 6–20)
BUN/CREAT BLD: ABNORMAL (ref 9–20)
CALCIUM SERPL-MCNC: 9.6 MG/DL (ref 8.6–10.4)
CHLORIDE BLD-SCNC: 106 MMOL/L (ref 98–107)
CO2: 24 MMOL/L (ref 20–31)
CREAT SERPL-MCNC: 0.29 MG/DL (ref 0.7–1.2)
DIFFERENTIAL TYPE: ABNORMAL
EOSINOPHILS RELATIVE PERCENT: 2 % (ref 1–4)
GFR AFRICAN AMERICAN: >60 ML/MIN
GFR NON-AFRICAN AMERICAN: >60 ML/MIN
GFR SERPL CREATININE-BSD FRML MDRD: ABNORMAL ML/MIN/{1.73_M2}
GFR SERPL CREATININE-BSD FRML MDRD: ABNORMAL ML/MIN/{1.73_M2}
GLUCOSE BLD-MCNC: 78 MG/DL (ref 70–99)
HCT VFR BLD CALC: 36.6 % (ref 40.7–50.3)
HEMOGLOBIN: 11 G/DL (ref 13–17)
IMMATURE GRANULOCYTES: 1 %
LYMPHOCYTES # BLD: 17 % (ref 25–45)
MCH RBC QN AUTO: 25 PG (ref 25.2–33.5)
MCHC RBC AUTO-ENTMCNC: 30.1 G/DL (ref 28.4–34.8)
MCV RBC AUTO: 83.2 FL (ref 82.6–102.9)
MONOCYTES # BLD: 7 % (ref 2–8)
NRBC AUTOMATED: 0 PER 100 WBC
PDW BLD-RTO: 17.8 % (ref 11.8–14.4)
PLATELET # BLD: 539 K/UL (ref 138–453)
PLATELET ESTIMATE: ABNORMAL
PMV BLD AUTO: 9 FL (ref 8.1–13.5)
POTASSIUM SERPL-SCNC: 3.6 MMOL/L (ref 3.7–5.3)
PREALBUMIN: 15.3 MG/DL (ref 20–40)
RBC # BLD: 4.4 M/UL (ref 4.21–5.77)
RBC # BLD: ABNORMAL 10*6/UL
SEG NEUTROPHILS: 72 % (ref 34–64)
SEGMENTED NEUTROPHILS ABSOLUTE COUNT: 11.17 K/UL (ref 1.5–8.1)
SODIUM BLD-SCNC: 144 MMOL/L (ref 135–144)
TOTAL PROTEIN: 6.9 G/DL (ref 6.4–8.3)
WBC # BLD: 15.1 K/UL (ref 4.5–13.5)
WBC # BLD: ABNORMAL 10*3/UL

## 2021-05-17 PROCEDURE — 84134 ASSAY OF PREALBUMIN: CPT

## 2021-05-17 PROCEDURE — 36415 COLL VENOUS BLD VENIPUNCTURE: CPT

## 2021-05-17 PROCEDURE — P9603 ONE-WAY ALLOW PRORATED MILES: HCPCS

## 2021-05-17 PROCEDURE — 85025 COMPLETE CBC W/AUTO DIFF WBC: CPT

## 2021-05-17 PROCEDURE — 80053 COMPREHEN METABOLIC PANEL: CPT

## 2021-07-08 ENCOUNTER — HOSPITAL ENCOUNTER (OUTPATIENT)
Age: 21
Setting detail: SPECIMEN
Discharge: HOME | End: 2021-07-08
Payer: MEDICAID

## 2021-07-08 LAB
ABSOLUTE EOS #: 0.36 K/UL (ref 0–0.44)
ABSOLUTE IMMATURE GRANULOCYTE: 0.07 K/UL (ref 0–0.3)
ABSOLUTE LYMPH #: 2.48 K/UL (ref 1.1–3.7)
ABSOLUTE MONO #: 0.91 K/UL (ref 0.1–1.4)
ALBUMIN SERPL-MCNC: 3.2 G/DL (ref 3.5–5.2)
ANION GAP SERPL CALCULATED.3IONS-SCNC: 13 MMOL/L (ref 9–17)
BASOPHILS # BLD: 1 % (ref 0–2)
BASOPHILS ABSOLUTE: 0.06 K/UL (ref 0–0.2)
BUN BLDV-MCNC: 9 MG/DL (ref 6–20)
BUN/CREAT BLD: ABNORMAL (ref 9–20)
CALCIUM SERPL-MCNC: 9.6 MG/DL (ref 8.6–10.4)
CHLORIDE BLD-SCNC: 102 MMOL/L (ref 98–107)
CO2: 25 MMOL/L (ref 20–31)
CREAT SERPL-MCNC: 0.34 MG/DL (ref 0.7–1.2)
DIFFERENTIAL TYPE: ABNORMAL
EOSINOPHILS RELATIVE PERCENT: 3 % (ref 1–4)
GFR AFRICAN AMERICAN: >60 ML/MIN
GFR NON-AFRICAN AMERICAN: >60 ML/MIN
GFR SERPL CREATININE-BSD FRML MDRD: ABNORMAL ML/MIN/{1.73_M2}
GFR SERPL CREATININE-BSD FRML MDRD: ABNORMAL ML/MIN/{1.73_M2}
GLUCOSE BLD-MCNC: 130 MG/DL (ref 70–99)
HCT VFR BLD CALC: 36.3 % (ref 40.7–50.3)
HEMOGLOBIN: 11 G/DL (ref 13–17)
IMMATURE GRANULOCYTES: 1 %
LYMPHOCYTES # BLD: 20 % (ref 25–45)
MCH RBC QN AUTO: 23.8 PG (ref 25.2–33.5)
MCHC RBC AUTO-ENTMCNC: 30.3 G/DL (ref 28.4–34.8)
MCV RBC AUTO: 78.4 FL (ref 82.6–102.9)
MONOCYTES # BLD: 7 % (ref 2–8)
NRBC AUTOMATED: 0 PER 100 WBC
PDW BLD-RTO: 18 % (ref 11.8–14.4)
PLATELET # BLD: 615 K/UL (ref 138–453)
PLATELET ESTIMATE: ABNORMAL
PMV BLD AUTO: 9.4 FL (ref 8.1–13.5)
POTASSIUM SERPL-SCNC: 3.7 MMOL/L (ref 3.7–5.3)
PREALBUMIN: 11 MG/DL (ref 20–40)
RBC # BLD: 4.63 M/UL (ref 4.21–5.77)
RBC # BLD: ABNORMAL 10*6/UL
SEG NEUTROPHILS: 68 % (ref 34–64)
SEGMENTED NEUTROPHILS ABSOLUTE COUNT: 8.73 K/UL (ref 1.5–8.1)
SODIUM BLD-SCNC: 140 MMOL/L (ref 135–144)
TSH SERPL DL<=0.05 MIU/L-ACNC: 4.28 MIU/L (ref 0.3–5)
WBC # BLD: 12.6 K/UL (ref 4.5–13.5)
WBC # BLD: ABNORMAL 10*3/UL

## 2021-07-08 PROCEDURE — 82040 ASSAY OF SERUM ALBUMIN: CPT

## 2021-07-08 PROCEDURE — 80048 BASIC METABOLIC PNL TOTAL CA: CPT

## 2021-07-08 PROCEDURE — 36415 COLL VENOUS BLD VENIPUNCTURE: CPT

## 2021-07-08 PROCEDURE — 84443 ASSAY THYROID STIM HORMONE: CPT

## 2021-07-08 PROCEDURE — P9603 ONE-WAY ALLOW PRORATED MILES: HCPCS

## 2021-07-08 PROCEDURE — 85025 COMPLETE CBC W/AUTO DIFF WBC: CPT

## 2021-07-08 PROCEDURE — 84134 ASSAY OF PREALBUMIN: CPT

## 2021-07-30 PROCEDURE — 87086 URINE CULTURE/COLONY COUNT: CPT

## 2021-07-30 PROCEDURE — 81001 URINALYSIS AUTO W/SCOPE: CPT

## 2021-07-30 PROCEDURE — 87186 SC STD MICRODIL/AGAR DIL: CPT

## 2021-07-30 PROCEDURE — 87077 CULTURE AEROBIC IDENTIFY: CPT

## 2021-07-31 ENCOUNTER — HOSPITAL ENCOUNTER (OUTPATIENT)
Age: 21
Setting detail: SPECIMEN
Discharge: HOME | End: 2021-07-31
Payer: COMMERCIAL

## 2021-07-31 LAB
-: ABNORMAL
AMORPHOUS: ABNORMAL
BACTERIA: ABNORMAL
BILIRUBIN URINE: NEGATIVE
CASTS UA: ABNORMAL /LPF (ref 0–2)
COLOR: YELLOW
COMMENT UA: ABNORMAL
CRYSTALS, UA: ABNORMAL /HPF
EPITHELIAL CELLS UA: ABNORMAL /HPF (ref 0–5)
GLUCOSE URINE: NEGATIVE
KETONES, URINE: NEGATIVE
LEUKOCYTE ESTERASE, URINE: ABNORMAL
MUCUS: ABNORMAL
NITRITE, URINE: POSITIVE
OTHER OBSERVATIONS UA: ABNORMAL
PH UA: 7.5 (ref 5–8)
PROTEIN UA: ABNORMAL
RBC UA: ABNORMAL /HPF (ref 0–2)
RENAL EPITHELIAL, UA: ABNORMAL /HPF
SPECIFIC GRAVITY UA: 1.01 (ref 1–1.03)
TRICHOMONAS: ABNORMAL
TURBIDITY: ABNORMAL
URINE HGB: ABNORMAL
UROBILINOGEN, URINE: NORMAL
WBC UA: ABNORMAL /HPF (ref 0–5)
YEAST: ABNORMAL

## 2021-07-31 PROCEDURE — 87077 CULTURE AEROBIC IDENTIFY: CPT

## 2021-07-31 PROCEDURE — 87186 SC STD MICRODIL/AGAR DIL: CPT

## 2021-07-31 PROCEDURE — 81001 URINALYSIS AUTO W/SCOPE: CPT

## 2021-07-31 PROCEDURE — 87086 URINE CULTURE/COLONY COUNT: CPT

## 2021-08-01 LAB
CULTURE: ABNORMAL
CULTURE: ABNORMAL
Lab: ABNORMAL
SPECIMEN DESCRIPTION: ABNORMAL

## 2021-08-19 ENCOUNTER — HOSPITAL ENCOUNTER (OUTPATIENT)
Age: 21
Setting detail: SPECIMEN
Discharge: HOME | End: 2021-08-19
Payer: MEDICAID

## 2021-08-19 LAB
ABSOLUTE EOS #: 0.1 K/UL (ref 0–0.44)
ABSOLUTE IMMATURE GRANULOCYTE: 0.09 K/UL (ref 0–0.3)
ABSOLUTE LYMPH #: 1.59 K/UL (ref 1.1–3.7)
ABSOLUTE MONO #: 1.17 K/UL (ref 0.1–1.4)
ALBUMIN SERPL-MCNC: 3.3 G/DL (ref 3.5–5.2)
ALBUMIN/GLOBULIN RATIO: ABNORMAL (ref 1–2.5)
ALP BLD-CCNC: 145 U/L (ref 40–129)
ALT SERPL-CCNC: 21 U/L (ref 5–41)
AMPHETAMINE SCREEN URINE: NEGATIVE
ANION GAP SERPL CALCULATED.3IONS-SCNC: 12 MMOL/L (ref 9–17)
AST SERPL-CCNC: 15 U/L
BARBITURATE SCREEN URINE: NEGATIVE
BASOPHILS # BLD: 1 % (ref 0–2)
BASOPHILS ABSOLUTE: 0.07 K/UL (ref 0–0.2)
BENZODIAZEPINE SCREEN, URINE: NEGATIVE
BILIRUB SERPL-MCNC: 0.19 MG/DL (ref 0.3–1.2)
BUN BLDV-MCNC: 23 MG/DL (ref 6–20)
BUN/CREAT BLD: 25 (ref 9–20)
BUPRENORPHINE URINE: NORMAL
CALCIUM SERPL-MCNC: 9.5 MG/DL (ref 8.6–10.4)
CANNABINOID SCREEN URINE: NEGATIVE
CHLORIDE BLD-SCNC: 102 MMOL/L (ref 98–107)
CO2: 23 MMOL/L (ref 20–31)
COCAINE METABOLITE, URINE: NEGATIVE
CREAT SERPL-MCNC: 0.93 MG/DL (ref 0.7–1.2)
DIFFERENTIAL TYPE: ABNORMAL
EOSINOPHILS RELATIVE PERCENT: 1 % (ref 1–4)
GFR AFRICAN AMERICAN: >60 ML/MIN
GFR NON-AFRICAN AMERICAN: >60 ML/MIN
GFR SERPL CREATININE-BSD FRML MDRD: ABNORMAL ML/MIN/{1.73_M2}
GFR SERPL CREATININE-BSD FRML MDRD: ABNORMAL ML/MIN/{1.73_M2}
GLUCOSE BLD-MCNC: 88 MG/DL (ref 70–99)
HCT VFR BLD CALC: 35.1 % (ref 40.7–50.3)
HEMOGLOBIN: 10.7 G/DL (ref 13–17)
IMMATURE GRANULOCYTES: 1 %
LYMPHOCYTES # BLD: 11 % (ref 25–45)
MCH RBC QN AUTO: 24.7 PG (ref 25.2–33.5)
MCHC RBC AUTO-ENTMCNC: 30.5 G/DL (ref 28.4–34.8)
MCV RBC AUTO: 80.9 FL (ref 82.6–102.9)
MDMA URINE: NORMAL
METHADONE SCREEN, URINE: NEGATIVE
METHAMPHETAMINE, URINE: NORMAL
MONOCYTES # BLD: 8 % (ref 2–8)
NRBC AUTOMATED: 0 PER 100 WBC
OPIATES, URINE: NEGATIVE
OXYCODONE SCREEN URINE: NEGATIVE
PDW BLD-RTO: 19.8 % (ref 11.8–14.4)
PHENCYCLIDINE, URINE: NEGATIVE
PLATELET # BLD: 495 K/UL (ref 138–453)
PLATELET ESTIMATE: ABNORMAL
PMV BLD AUTO: 9.4 FL (ref 8.1–13.5)
POTASSIUM SERPL-SCNC: 4.2 MMOL/L (ref 3.7–5.3)
PROPOXYPHENE, URINE: NORMAL
RBC # BLD: 4.34 M/UL (ref 4.21–5.77)
RBC # BLD: ABNORMAL 10*6/UL
SEG NEUTROPHILS: 78 % (ref 34–64)
SEGMENTED NEUTROPHILS ABSOLUTE COUNT: 11.72 K/UL (ref 1.5–8.1)
SODIUM BLD-SCNC: 137 MMOL/L (ref 135–144)
TEST INFORMATION: NORMAL
TOTAL PROTEIN: 7.3 G/DL (ref 6.4–8.3)
TRICYCLIC ANTIDEPRESSANTS, UR: NORMAL
WBC # BLD: 14.7 K/UL (ref 4.5–13.5)
WBC # BLD: ABNORMAL 10*3/UL

## 2021-08-19 PROCEDURE — 85025 COMPLETE CBC W/AUTO DIFF WBC: CPT

## 2021-08-19 PROCEDURE — 80307 DRUG TEST PRSMV CHEM ANLYZR: CPT

## 2021-08-19 PROCEDURE — 80053 COMPREHEN METABOLIC PANEL: CPT

## 2021-08-19 PROCEDURE — 36415 COLL VENOUS BLD VENIPUNCTURE: CPT

## 2021-08-19 PROCEDURE — P9603 ONE-WAY ALLOW PRORATED MILES: HCPCS

## 2021-11-20 PROCEDURE — 87086 URINE CULTURE/COLONY COUNT: CPT

## 2021-11-20 PROCEDURE — 87186 SC STD MICRODIL/AGAR DIL: CPT

## 2021-11-20 PROCEDURE — 87077 CULTURE AEROBIC IDENTIFY: CPT

## 2021-11-20 PROCEDURE — 81001 URINALYSIS AUTO W/SCOPE: CPT

## 2021-11-21 ENCOUNTER — HOSPITAL ENCOUNTER (OUTPATIENT)
Age: 21
Setting detail: SPECIMEN
Discharge: HOME | End: 2021-11-21
Payer: MEDICAID

## 2021-11-21 LAB
-: ABNORMAL
AMORPHOUS: ABNORMAL
BACTERIA: ABNORMAL
BILIRUBIN URINE: NEGATIVE
CASTS UA: ABNORMAL /LPF (ref 0–2)
COLOR: ABNORMAL
COMMENT UA: ABNORMAL
CRYSTALS, UA: ABNORMAL /HPF
EPITHELIAL CELLS UA: ABNORMAL /HPF (ref 0–5)
GLUCOSE URINE: NEGATIVE
KETONES, URINE: NEGATIVE
LEUKOCYTE ESTERASE, URINE: ABNORMAL
MUCUS: ABNORMAL
NITRITE, URINE: POSITIVE
OTHER OBSERVATIONS UA: ABNORMAL
PH UA: 8 (ref 5–8)
PROTEIN UA: ABNORMAL
RBC UA: ABNORMAL /HPF (ref 0–2)
RENAL EPITHELIAL, UA: ABNORMAL /HPF
SPECIFIC GRAVITY UA: 1.02 (ref 1–1.03)
TRICHOMONAS: ABNORMAL
TURBIDITY: ABNORMAL
URINE HGB: ABNORMAL
UROBILINOGEN, URINE: NORMAL
WBC UA: ABNORMAL /HPF (ref 0–5)
YEAST: ABNORMAL

## 2021-11-21 PROCEDURE — 87086 URINE CULTURE/COLONY COUNT: CPT

## 2021-11-21 PROCEDURE — 81001 URINALYSIS AUTO W/SCOPE: CPT

## 2021-11-21 PROCEDURE — 87077 CULTURE AEROBIC IDENTIFY: CPT

## 2021-11-21 PROCEDURE — 87186 SC STD MICRODIL/AGAR DIL: CPT

## 2021-11-22 LAB
CULTURE: ABNORMAL
CULTURE: ABNORMAL
Lab: ABNORMAL
SPECIMEN DESCRIPTION: ABNORMAL

## 2021-11-24 ENCOUNTER — FOLLOWUP TELEPHONE ENCOUNTER (OUTPATIENT)
Dept: WOUND CARE | Age: 21
End: 2021-11-24

## 2021-12-08 ENCOUNTER — HOSPITAL ENCOUNTER (OUTPATIENT)
Age: 21
Setting detail: SPECIMEN
Discharge: HOME | End: 2021-12-08
Payer: MEDICAID

## 2021-12-08 LAB
-: ABNORMAL
AMORPHOUS: ABNORMAL
BACTERIA: ABNORMAL
BILIRUBIN URINE: NEGATIVE
CASTS UA: ABNORMAL /LPF (ref 0–2)
COLOR: ABNORMAL
COMMENT UA: ABNORMAL
CRYSTALS, UA: ABNORMAL /HPF
CRYSTALS, UA: ABNORMAL /HPF
EPITHELIAL CELLS UA: ABNORMAL /HPF (ref 0–5)
GLUCOSE URINE: NEGATIVE
KETONES, URINE: NEGATIVE
LEUKOCYTE ESTERASE, URINE: ABNORMAL
MUCUS: ABNORMAL
NITRITE, URINE: POSITIVE
OTHER OBSERVATIONS UA: ABNORMAL
PH UA: 5.5 (ref 5–8)
PROTEIN UA: ABNORMAL
RBC UA: ABNORMAL /HPF (ref 0–2)
RENAL EPITHELIAL, UA: ABNORMAL /HPF
SPECIFIC GRAVITY UA: 1.02 (ref 1–1.03)
TRICHOMONAS: ABNORMAL
TURBIDITY: ABNORMAL
URINE HGB: ABNORMAL
UROBILINOGEN, URINE: NORMAL
WBC UA: ABNORMAL /HPF (ref 0–5)
YEAST: ABNORMAL

## 2021-12-08 PROCEDURE — 87077 CULTURE AEROBIC IDENTIFY: CPT

## 2021-12-08 PROCEDURE — 86403 PARTICLE AGGLUT ANTBDY SCRN: CPT

## 2021-12-08 PROCEDURE — 81001 URINALYSIS AUTO W/SCOPE: CPT

## 2021-12-08 PROCEDURE — 87186 SC STD MICRODIL/AGAR DIL: CPT

## 2021-12-08 PROCEDURE — 87086 URINE CULTURE/COLONY COUNT: CPT

## 2021-12-10 LAB
CULTURE: ABNORMAL
CULTURE: ABNORMAL
Lab: ABNORMAL
SPECIMEN DESCRIPTION: ABNORMAL

## 2021-12-27 PROCEDURE — 81001 URINALYSIS AUTO W/SCOPE: CPT

## 2021-12-27 PROCEDURE — 87086 URINE CULTURE/COLONY COUNT: CPT

## 2021-12-28 ENCOUNTER — HOSPITAL ENCOUNTER (OUTPATIENT)
Age: 21
Setting detail: SPECIMEN
Discharge: HOME | End: 2021-12-28
Payer: COMMERCIAL

## 2021-12-28 LAB
-: ABNORMAL
AMORPHOUS: ABNORMAL
BACTERIA: ABNORMAL
BILIRUBIN URINE: NEGATIVE
CASTS UA: ABNORMAL /LPF (ref 0–2)
CASTS UA: ABNORMAL /LPF (ref 0–2)
COLOR: ABNORMAL
COMMENT UA: ABNORMAL
CRYSTALS, UA: ABNORMAL /HPF
EPITHELIAL CELLS UA: ABNORMAL /HPF (ref 0–5)
GLUCOSE URINE: NEGATIVE
KETONES, URINE: NEGATIVE
LEUKOCYTE ESTERASE, URINE: ABNORMAL
MUCUS: ABNORMAL
NITRITE, URINE: POSITIVE
OTHER OBSERVATIONS UA: ABNORMAL
PH UA: 6 (ref 5–8)
PROTEIN UA: ABNORMAL
RBC UA: ABNORMAL /HPF (ref 0–2)
RENAL EPITHELIAL, UA: ABNORMAL /HPF
SPECIFIC GRAVITY UA: 1.02 (ref 1–1.03)
TRICHOMONAS: ABNORMAL
TURBIDITY: ABNORMAL
URINE HGB: ABNORMAL
UROBILINOGEN, URINE: NORMAL
WBC UA: ABNORMAL /HPF (ref 0–5)
YEAST: ABNORMAL

## 2021-12-28 PROCEDURE — 81001 URINALYSIS AUTO W/SCOPE: CPT

## 2021-12-28 PROCEDURE — 86403 PARTICLE AGGLUT ANTBDY SCRN: CPT

## 2021-12-28 PROCEDURE — 87086 URINE CULTURE/COLONY COUNT: CPT

## 2021-12-30 ENCOUNTER — HOSPITAL ENCOUNTER (OUTPATIENT)
Age: 21
Setting detail: SPECIMEN
Discharge: HOME | End: 2021-12-30

## 2021-12-30 LAB
ALBUMIN SERPL-MCNC: 3.5 G/DL (ref 3.5–5.2)
ALBUMIN/GLOBULIN RATIO: 1.1 (ref 1–2.5)
ALP BLD-CCNC: 150 U/L (ref 40–129)
ALT SERPL-CCNC: 12 U/L (ref 5–41)
ANION GAP SERPL CALCULATED.3IONS-SCNC: 12 MMOL/L (ref 9–17)
AST SERPL-CCNC: 12 U/L
BILIRUB SERPL-MCNC: <0.1 MG/DL (ref 0.3–1.2)
BUN BLDV-MCNC: 16 MG/DL (ref 6–20)
BUN/CREAT BLD: ABNORMAL (ref 9–20)
CALCIUM SERPL-MCNC: 9 MG/DL (ref 8.6–10.4)
CHLORIDE BLD-SCNC: 105 MMOL/L (ref 98–107)
CO2: 21 MMOL/L (ref 20–31)
CREAT SERPL-MCNC: 0.44 MG/DL (ref 0.7–1.2)
CULTURE: ABNORMAL
GFR AFRICAN AMERICAN: >60 ML/MIN
GFR NON-AFRICAN AMERICAN: >60 ML/MIN
GFR SERPL CREATININE-BSD FRML MDRD: ABNORMAL ML/MIN/{1.73_M2}
GFR SERPL CREATININE-BSD FRML MDRD: ABNORMAL ML/MIN/{1.73_M2}
GLUCOSE BLD-MCNC: 123 MG/DL (ref 70–99)
HCT VFR BLD CALC: 37.2 % (ref 40.7–50.3)
HEMOGLOBIN: 11.4 G/DL (ref 13–17)
Lab: ABNORMAL
MCH RBC QN AUTO: 25.7 PG (ref 25.2–33.5)
MCHC RBC AUTO-ENTMCNC: 30.6 G/DL (ref 28.4–34.8)
MCV RBC AUTO: 84 FL (ref 82.6–102.9)
NRBC AUTOMATED: 0 PER 100 WBC
PDW BLD-RTO: 19.3 % (ref 11.8–14.4)
PLATELET # BLD: 379 K/UL (ref 138–453)
PMV BLD AUTO: 10 FL (ref 8.1–13.5)
POTASSIUM SERPL-SCNC: 3.5 MMOL/L (ref 3.7–5.3)
RBC # BLD: 4.43 M/UL (ref 4.21–5.77)
SODIUM BLD-SCNC: 138 MMOL/L (ref 135–144)
SPECIMEN DESCRIPTION: ABNORMAL
TOTAL PROTEIN: 6.8 G/DL (ref 6.4–8.3)
WBC # BLD: 7 K/UL (ref 3.5–11.3)

## 2021-12-30 PROCEDURE — 36415 COLL VENOUS BLD VENIPUNCTURE: CPT

## 2021-12-30 PROCEDURE — P9603 ONE-WAY ALLOW PRORATED MILES: HCPCS

## 2021-12-30 PROCEDURE — 80053 COMPREHEN METABOLIC PANEL: CPT

## 2021-12-30 PROCEDURE — 85027 COMPLETE CBC AUTOMATED: CPT

## 2022-03-07 ENCOUNTER — HOSPITAL ENCOUNTER (OUTPATIENT)
Age: 22
Setting detail: SPECIMEN
Discharge: HOME | End: 2022-03-07

## 2022-03-07 LAB
-: ABNORMAL
AMORPHOUS: ABNORMAL
BACTERIA: ABNORMAL
BILIRUBIN URINE: NEGATIVE
COLOR: YELLOW
EPITHELIAL CELLS UA: ABNORMAL /HPF (ref 0–5)
GLUCOSE URINE: NEGATIVE
KETONES, URINE: NEGATIVE
LEUKOCYTE ESTERASE, URINE: ABNORMAL
NITRITE, URINE: NEGATIVE
PH UA: 6 (ref 5–8)
PROTEIN UA: ABNORMAL
RBC UA: ABNORMAL /HPF (ref 0–2)
SPECIFIC GRAVITY UA: 1.02 (ref 1–1.03)
TURBIDITY: CLEAR
URINE HGB: ABNORMAL
UROBILINOGEN, URINE: NORMAL
WBC UA: ABNORMAL /HPF (ref 0–5)

## 2022-03-07 PROCEDURE — 87086 URINE CULTURE/COLONY COUNT: CPT

## 2022-03-07 PROCEDURE — 81001 URINALYSIS AUTO W/SCOPE: CPT

## 2022-03-09 LAB
CULTURE: NORMAL
SPECIMEN DESCRIPTION: NORMAL

## 2022-03-12 ENCOUNTER — HOSPITAL ENCOUNTER (OUTPATIENT)
Age: 22
Setting detail: SPECIMEN
Discharge: HOME | End: 2022-03-12
Payer: MEDICAID

## 2022-03-12 PROCEDURE — 87077 CULTURE AEROBIC IDENTIFY: CPT

## 2022-03-12 PROCEDURE — 87086 URINE CULTURE/COLONY COUNT: CPT

## 2022-03-12 PROCEDURE — 87186 SC STD MICRODIL/AGAR DIL: CPT

## 2022-03-14 LAB
CULTURE: ABNORMAL
Lab: ABNORMAL
SPECIMEN DESCRIPTION: ABNORMAL

## 2022-04-21 ENCOUNTER — HOSPITAL ENCOUNTER (OUTPATIENT)
Age: 22
Setting detail: SPECIMEN
Discharge: HOME | End: 2022-04-21

## 2022-04-21 LAB
ANION GAP SERPL CALCULATED.3IONS-SCNC: 8 MMOL/L (ref 9–17)
BUN BLDV-MCNC: 28 MG/DL (ref 6–20)
CALCIUM SERPL-MCNC: 8.6 MG/DL (ref 8.6–10.4)
CHLORIDE BLD-SCNC: 105 MMOL/L (ref 98–107)
CO2: 26 MMOL/L (ref 20–31)
CREAT SERPL-MCNC: 0.56 MG/DL (ref 0.7–1.2)
GFR AFRICAN AMERICAN: >60 ML/MIN
GFR NON-AFRICAN AMERICAN: >60 ML/MIN
GFR SERPL CREATININE-BSD FRML MDRD: ABNORMAL ML/MIN/{1.73_M2}
GLUCOSE BLD-MCNC: 98 MG/DL (ref 70–99)
MAGNESIUM: 2 MG/DL (ref 1.6–2.6)
POTASSIUM SERPL-SCNC: 3.9 MMOL/L (ref 3.7–5.3)
SODIUM BLD-SCNC: 139 MMOL/L (ref 135–144)

## 2022-04-21 PROCEDURE — P9603 ONE-WAY ALLOW PRORATED MILES: HCPCS

## 2022-04-21 PROCEDURE — 83735 ASSAY OF MAGNESIUM: CPT

## 2022-04-21 PROCEDURE — 36415 COLL VENOUS BLD VENIPUNCTURE: CPT

## 2022-04-21 PROCEDURE — 80048 BASIC METABOLIC PNL TOTAL CA: CPT

## 2022-04-25 ENCOUNTER — HOSPITAL ENCOUNTER (OUTPATIENT)
Age: 22
Setting detail: SPECIMEN
Discharge: HOME | End: 2022-04-25

## 2022-04-25 LAB
ANION GAP SERPL CALCULATED.3IONS-SCNC: 9 MMOL/L (ref 9–17)
BUN BLDV-MCNC: 20 MG/DL (ref 6–20)
CALCIUM SERPL-MCNC: 9.1 MG/DL (ref 8.6–10.4)
CHLORIDE BLD-SCNC: 106 MMOL/L (ref 98–107)
CO2: 24 MMOL/L (ref 20–31)
CREAT SERPL-MCNC: 0.4 MG/DL (ref 0.7–1.2)
GFR AFRICAN AMERICAN: >60 ML/MIN
GFR NON-AFRICAN AMERICAN: >60 ML/MIN
GFR SERPL CREATININE-BSD FRML MDRD: ABNORMAL ML/MIN/{1.73_M2}
GLUCOSE BLD-MCNC: 74 MG/DL (ref 70–99)
MAGNESIUM: 2.1 MG/DL (ref 1.6–2.6)
POTASSIUM SERPL-SCNC: 3.8 MMOL/L (ref 3.7–5.3)
SODIUM BLD-SCNC: 139 MMOL/L (ref 135–144)

## 2022-04-25 PROCEDURE — 80048 BASIC METABOLIC PNL TOTAL CA: CPT

## 2022-04-25 PROCEDURE — P9603 ONE-WAY ALLOW PRORATED MILES: HCPCS

## 2022-04-25 PROCEDURE — 83735 ASSAY OF MAGNESIUM: CPT

## 2022-04-25 PROCEDURE — 36415 COLL VENOUS BLD VENIPUNCTURE: CPT

## 2022-06-06 ENCOUNTER — HOSPITAL ENCOUNTER (OUTPATIENT)
Age: 22
Setting detail: SPECIMEN
Discharge: HOME | End: 2022-06-06
Payer: MEDICAID

## 2022-06-06 LAB
ALBUMIN SERPL-MCNC: 4 G/DL (ref 3.5–5.2)
ALP BLD-CCNC: 155 U/L (ref 40–129)
ALT SERPL-CCNC: 6 U/L (ref 5–41)
ANION GAP SERPL CALCULATED.3IONS-SCNC: 9 MMOL/L (ref 9–17)
AST SERPL-CCNC: 13 U/L
BILIRUB SERPL-MCNC: 0.18 MG/DL (ref 0.3–1.2)
BUN BLDV-MCNC: 11 MG/DL (ref 6–20)
BUN/CREAT BLD: 20 (ref 9–20)
CALCIUM SERPL-MCNC: 9.2 MG/DL (ref 8.6–10.4)
CHLORIDE BLD-SCNC: 100 MMOL/L (ref 98–107)
CO2: 27 MMOL/L (ref 20–31)
CREAT SERPL-MCNC: 0.54 MG/DL (ref 0.7–1.2)
GFR AFRICAN AMERICAN: >60 ML/MIN
GFR NON-AFRICAN AMERICAN: >60 ML/MIN
GFR SERPL CREATININE-BSD FRML MDRD: ABNORMAL ML/MIN/{1.73_M2}
GLUCOSE BLD-MCNC: 74 MG/DL (ref 70–99)
HCT VFR BLD CALC: 37.1 % (ref 40.7–50.3)
HEMOGLOBIN: 12 G/DL (ref 13–17)
MCH RBC QN AUTO: 26.9 PG (ref 25.2–33.5)
MCHC RBC AUTO-ENTMCNC: 32.3 G/DL (ref 28.4–34.8)
MCV RBC AUTO: 83.2 FL (ref 82.6–102.9)
NRBC AUTOMATED: 0 PER 100 WBC
PDW BLD-RTO: 18 % (ref 11.8–14.4)
PLATELET # BLD: 266 K/UL (ref 138–453)
PMV BLD AUTO: 10.8 FL (ref 8.1–13.5)
POTASSIUM SERPL-SCNC: 3.3 MMOL/L (ref 3.7–5.3)
RBC # BLD: 4.46 M/UL (ref 4.21–5.77)
SODIUM BLD-SCNC: 136 MMOL/L (ref 135–144)
TOTAL PROTEIN: 7 G/DL (ref 6.4–8.3)
WBC # BLD: 6.8 K/UL (ref 3.5–11.3)

## 2022-06-06 PROCEDURE — 80053 COMPREHEN METABOLIC PANEL: CPT

## 2022-06-06 PROCEDURE — 85027 COMPLETE CBC AUTOMATED: CPT

## 2022-06-06 PROCEDURE — P9603 ONE-WAY ALLOW PRORATED MILES: HCPCS

## 2022-06-06 PROCEDURE — 36415 COLL VENOUS BLD VENIPUNCTURE: CPT

## 2022-11-13 ENCOUNTER — HOSPITAL ENCOUNTER (INPATIENT)
Age: 22
LOS: 6 days | Discharge: NURSING, REHAB OR LONG-TERM CARE | DRG: 466 | End: 2022-11-19
Attending: EMERGENCY MEDICINE | Admitting: INTERNAL MEDICINE
Payer: MEDICAID

## 2022-11-13 DIAGNOSIS — T15.90XA FOREIGN BODY IN EYE, UNSPECIFIED LATERALITY, INITIAL ENCOUNTER: ICD-10-CM

## 2022-11-13 DIAGNOSIS — G82.50 TETRAPLEGIA (HCC): ICD-10-CM

## 2022-11-13 DIAGNOSIS — Z87.828 HISTORY OF GUNSHOT WOUND: ICD-10-CM

## 2022-11-13 DIAGNOSIS — N39.0 URINARY TRACT INFECTION WITHOUT HEMATURIA, SITE UNSPECIFIED: Primary | ICD-10-CM

## 2022-11-13 DIAGNOSIS — A41.9 SEPTICEMIA (HCC): ICD-10-CM

## 2022-11-13 DIAGNOSIS — L89.324 PRESSURE INJURY OF LEFT PERINEAL ISCHIAL REGION, STAGE 4 (HCC): ICD-10-CM

## 2022-11-13 DIAGNOSIS — L89.314 PRESSURE INJURY OF RIGHT PERINEAL ISCHIAL REGION, STAGE 4 (HCC): ICD-10-CM

## 2022-11-13 LAB
ABSOLUTE EOS #: 0.08 K/UL (ref 0–0.44)
ABSOLUTE IMMATURE GRANULOCYTE: 0.06 K/UL (ref 0–0.3)
ABSOLUTE LYMPH #: 3.45 K/UL (ref 1.1–3.7)
ABSOLUTE MONO #: 0.72 K/UL (ref 0.1–1.2)
ANION GAP SERPL CALCULATED.3IONS-SCNC: 14 MMOL/L (ref 9–17)
BACTERIA: ABNORMAL
BASOPHILS # BLD: 0 % (ref 0–2)
BASOPHILS ABSOLUTE: 0.05 K/UL (ref 0–0.2)
BILIRUBIN URINE: NEGATIVE
BUN BLDV-MCNC: 11 MG/DL (ref 6–20)
BUN/CREAT BLD: 12 (ref 9–20)
CALCIUM SERPL-MCNC: 9.8 MG/DL (ref 8.6–10.4)
CHLORIDE BLD-SCNC: 102 MMOL/L (ref 98–107)
CO2: 25 MMOL/L (ref 20–31)
COLOR: YELLOW
CREAT SERPL-MCNC: 0.91 MG/DL (ref 0.7–1.2)
EOSINOPHILS RELATIVE PERCENT: 1 % (ref 1–4)
EPITHELIAL CELLS UA: ABNORMAL /HPF (ref 0–5)
GFR SERPL CREATININE-BSD FRML MDRD: >60 ML/MIN/1.73M2
GLUCOSE BLD-MCNC: 106 MG/DL (ref 70–99)
GLUCOSE URINE: NEGATIVE
HCT VFR BLD CALC: 40.1 % (ref 40.7–50.3)
HEMOGLOBIN: 12.3 G/DL (ref 13–17)
IMMATURE GRANULOCYTES: 0 %
KETONES, URINE: ABNORMAL
LACTIC ACID, SEPSIS: 1.9 MMOL/L (ref 0.5–1.9)
LEUKOCYTE ESTERASE, URINE: ABNORMAL
LYMPHOCYTES # BLD: 24 % (ref 24–43)
MCH RBC QN AUTO: 24.6 PG (ref 25.2–33.5)
MCHC RBC AUTO-ENTMCNC: 30.7 G/DL (ref 28.4–34.8)
MCV RBC AUTO: 80.4 FL (ref 82.6–102.9)
MONOCYTES # BLD: 5 % (ref 3–12)
MUCUS: ABNORMAL
NITRITE, URINE: POSITIVE
NRBC AUTOMATED: 0 PER 100 WBC
PDW BLD-RTO: 17.5 % (ref 11.8–14.4)
PH UA: 6.5 (ref 5–8)
PLATELET # BLD: 592 K/UL (ref 138–453)
PMV BLD AUTO: 8.9 FL (ref 8.1–13.5)
POTASSIUM SERPL-SCNC: 3.7 MMOL/L (ref 3.7–5.3)
PROTEIN UA: ABNORMAL
RBC # BLD: 4.99 M/UL (ref 4.21–5.77)
RBC # BLD: ABNORMAL 10*6/UL
RBC UA: ABNORMAL /HPF (ref 0–2)
SEG NEUTROPHILS: 70 % (ref 36–65)
SEGMENTED NEUTROPHILS ABSOLUTE COUNT: 10.06 K/UL (ref 1.5–8.1)
SODIUM BLD-SCNC: 141 MMOL/L (ref 135–144)
SPECIFIC GRAVITY UA: 1.04 (ref 1–1.03)
TURBIDITY: ABNORMAL
URINE HGB: ABNORMAL
UROBILINOGEN, URINE: NORMAL
WBC # BLD: 14.4 K/UL (ref 3.5–11.3)
WBC UA: ABNORMAL /HPF (ref 0–5)

## 2022-11-13 PROCEDURE — 36415 COLL VENOUS BLD VENIPUNCTURE: CPT

## 2022-11-13 PROCEDURE — 87040 BLOOD CULTURE FOR BACTERIA: CPT

## 2022-11-13 PROCEDURE — 2580000003 HC RX 258: Performed by: PHYSICIAN ASSISTANT

## 2022-11-13 PROCEDURE — 87086 URINE CULTURE/COLONY COUNT: CPT

## 2022-11-13 PROCEDURE — 99285 EMERGENCY DEPT VISIT HI MDM: CPT

## 2022-11-13 PROCEDURE — 81001 URINALYSIS AUTO W/SCOPE: CPT

## 2022-11-13 PROCEDURE — 85025 COMPLETE CBC W/AUTO DIFF WBC: CPT

## 2022-11-13 PROCEDURE — 2060000000 HC ICU INTERMEDIATE R&B

## 2022-11-13 PROCEDURE — 80048 BASIC METABOLIC PNL TOTAL CA: CPT

## 2022-11-13 PROCEDURE — 87186 SC STD MICRODIL/AGAR DIL: CPT

## 2022-11-13 PROCEDURE — 83605 ASSAY OF LACTIC ACID: CPT

## 2022-11-13 PROCEDURE — 6360000002 HC RX W HCPCS: Performed by: PHYSICIAN ASSISTANT

## 2022-11-13 PROCEDURE — 87077 CULTURE AEROBIC IDENTIFY: CPT

## 2022-11-13 RX ORDER — 0.9 % SODIUM CHLORIDE 0.9 %
30 INTRAVENOUS SOLUTION INTRAVENOUS ONCE
Status: COMPLETED | OUTPATIENT
Start: 2022-11-13 | End: 2022-11-13

## 2022-11-13 RX ORDER — SODIUM CHLORIDE 0.9 % (FLUSH) 0.9 %
5-40 SYRINGE (ML) INJECTION PRN
Status: DISCONTINUED | OUTPATIENT
Start: 2022-11-13 | End: 2022-11-19 | Stop reason: HOSPADM

## 2022-11-13 RX ORDER — SODIUM CHLORIDE 0.9 % (FLUSH) 0.9 %
5-40 SYRINGE (ML) INJECTION EVERY 12 HOURS SCHEDULED
Status: DISCONTINUED | OUTPATIENT
Start: 2022-11-13 | End: 2022-11-19 | Stop reason: HOSPADM

## 2022-11-13 RX ORDER — SODIUM CHLORIDE 9 MG/ML
INJECTION, SOLUTION INTRAVENOUS PRN
Status: DISCONTINUED | OUTPATIENT
Start: 2022-11-13 | End: 2022-11-14 | Stop reason: SDUPTHER

## 2022-11-13 RX ADMIN — SODIUM CHLORIDE 2000 ML: 9 INJECTION, SOLUTION INTRAVENOUS at 22:04

## 2022-11-13 RX ADMIN — CEFTRIAXONE SODIUM 1000 MG: 1 INJECTION, POWDER, FOR SOLUTION INTRAMUSCULAR; INTRAVENOUS at 23:10

## 2022-11-14 PROBLEM — Z87.828 HISTORY OF GUNSHOT WOUND: Status: ACTIVE | Noted: 2022-11-14

## 2022-11-14 PROBLEM — G82.50 TETRAPLEGIA (HCC): Status: ACTIVE | Noted: 2021-03-09

## 2022-11-14 PROBLEM — N31.9 NEUROMUSCULAR DYSFUNCTION OF BLADDER, UNSPECIFIED: Status: ACTIVE | Noted: 2021-03-19

## 2022-11-14 PROBLEM — N31.9 NEUROGENIC BLADDER: Status: ACTIVE | Noted: 2021-03-12

## 2022-11-14 PROBLEM — I95.9 HYPOTENSION, UNSPECIFIED: Status: ACTIVE | Noted: 2021-02-26

## 2022-11-14 PROBLEM — R57.1 HYPOVOLEMIC SHOCK (HCC): Status: ACTIVE | Noted: 2021-02-26

## 2022-11-14 PROBLEM — G83.9 PARALYSIS (HCC): Status: ACTIVE | Noted: 2021-03-02

## 2022-11-14 PROBLEM — N30.00 ACUTE CYSTITIS: Status: ACTIVE | Noted: 2022-07-11

## 2022-11-14 PROBLEM — Z87.898: Status: ACTIVE | Noted: 2022-11-14

## 2022-11-14 PROBLEM — S14.119A: Status: ACTIVE | Noted: 2020-12-02

## 2022-11-14 PROBLEM — S12.9XXA: Status: ACTIVE | Noted: 2020-12-02

## 2022-11-14 PROBLEM — T83.090A: Status: ACTIVE | Noted: 2022-05-23

## 2022-11-14 PROBLEM — F33.1 MODERATE EPISODE OF RECURRENT MAJOR DEPRESSIVE DISORDER (HCC): Status: ACTIVE | Noted: 2021-09-01

## 2022-11-14 PROBLEM — R31.9 TRAUMATIC HEMATURIA: Status: ACTIVE | Noted: 2021-02-26

## 2022-11-14 PROBLEM — T83.511A INFECTION AND INFLAMMATORY REACTION DUE TO INDWELLING URETHRAL CATHETER, INITIAL ENCOUNTER (HCC): Status: ACTIVE | Noted: 2022-09-12

## 2022-11-14 PROBLEM — E83.42 HYPOMAGNESEMIA: Status: ACTIVE | Noted: 2022-02-19

## 2022-11-14 PROBLEM — G89.21 CHRONIC PAIN DUE TO TRAUMA: Status: ACTIVE | Noted: 2021-09-01

## 2022-11-14 PROBLEM — E87.6 HYPOKALEMIA: Status: ACTIVE | Noted: 2022-02-19

## 2022-11-14 PROBLEM — R56.9 SEIZURE (HCC): Status: ACTIVE | Noted: 2022-06-11

## 2022-11-14 LAB
ALBUMIN SERPL-MCNC: 3.5 G/DL (ref 3.5–5.2)
ALP BLD-CCNC: 132 U/L (ref 40–129)
ALT SERPL-CCNC: 10 U/L (ref 5–41)
ANION GAP SERPL CALCULATED.3IONS-SCNC: 11 MMOL/L (ref 9–17)
AST SERPL-CCNC: 9 U/L
BILIRUB SERPL-MCNC: 0.2 MG/DL (ref 0.3–1.2)
BUN BLDV-MCNC: 11 MG/DL (ref 6–20)
BUN/CREAT BLD: 21 (ref 9–20)
CALCIUM SERPL-MCNC: 9.2 MG/DL (ref 8.6–10.4)
CHLORIDE BLD-SCNC: 104 MMOL/L (ref 98–107)
CO2: 26 MMOL/L (ref 20–31)
CREAT SERPL-MCNC: 0.53 MG/DL (ref 0.7–1.2)
GFR SERPL CREATININE-BSD FRML MDRD: >60 ML/MIN/1.73M2
GLUCOSE BLD-MCNC: 122 MG/DL (ref 70–99)
INR BLD: 1.2
LACTIC ACID, SEPSIS: 0.7 MMOL/L (ref 0.5–1.9)
POTASSIUM SERPL-SCNC: 3.6 MMOL/L (ref 3.7–5.3)
PROCALCITONIN: 0.24 NG/ML
PROTHROMBIN TIME: 15.2 SEC (ref 11.5–14.2)
SODIUM BLD-SCNC: 141 MMOL/L (ref 135–144)
TOTAL PROTEIN: 7.6 G/DL (ref 6.4–8.3)
VANCOMYCIN RANDOM: 12.6 UG/ML

## 2022-11-14 PROCEDURE — APPSS45 APP SPLIT SHARED TIME 31-45 MINUTES: Performed by: NURSE PRACTITIONER

## 2022-11-14 PROCEDURE — 2580000003 HC RX 258: Performed by: NURSE PRACTITIONER

## 2022-11-14 PROCEDURE — 80202 ASSAY OF VANCOMYCIN: CPT

## 2022-11-14 PROCEDURE — 6370000000 HC RX 637 (ALT 250 FOR IP): Performed by: INTERNAL MEDICINE

## 2022-11-14 PROCEDURE — 6360000002 HC RX W HCPCS: Performed by: NURSE PRACTITIONER

## 2022-11-14 PROCEDURE — 83605 ASSAY OF LACTIC ACID: CPT

## 2022-11-14 PROCEDURE — 36415 COLL VENOUS BLD VENIPUNCTURE: CPT

## 2022-11-14 PROCEDURE — 80053 COMPREHEN METABOLIC PANEL: CPT

## 2022-11-14 PROCEDURE — 84145 PROCALCITONIN (PCT): CPT

## 2022-11-14 PROCEDURE — 85610 PROTHROMBIN TIME: CPT

## 2022-11-14 PROCEDURE — 2060000000 HC ICU INTERMEDIATE R&B

## 2022-11-14 RX ORDER — FAMOTIDINE 20 MG/1
20 TABLET, FILM COATED ORAL DAILY
Status: DISCONTINUED | OUTPATIENT
Start: 2022-11-14 | End: 2022-11-19 | Stop reason: HOSPADM

## 2022-11-14 RX ORDER — ZONISAMIDE 100 MG/1
100 CAPSULE ORAL DAILY
Status: DISCONTINUED | OUTPATIENT
Start: 2022-11-14 | End: 2022-11-14

## 2022-11-14 RX ORDER — GABAPENTIN 400 MG/1
400 CAPSULE ORAL 3 TIMES DAILY
COMMUNITY

## 2022-11-14 RX ORDER — MAGNESIUM HYDROXIDE/ALUMINUM HYDROXICE/SIMETHICONE 120; 1200; 1200 MG/30ML; MG/30ML; MG/30ML
5 SUSPENSION ORAL EVERY 6 HOURS PRN
Status: DISCONTINUED | OUTPATIENT
Start: 2022-11-14 | End: 2022-11-19 | Stop reason: HOSPADM

## 2022-11-14 RX ORDER — ALBUTEROL SULFATE 2.5 MG/3ML
2.5 SOLUTION RESPIRATORY (INHALATION) EVERY 4 HOURS PRN
Status: DISCONTINUED | OUTPATIENT
Start: 2022-11-14 | End: 2022-11-19 | Stop reason: HOSPADM

## 2022-11-14 RX ORDER — SODIUM CHLORIDE 0.9 % (FLUSH) 0.9 %
5-40 SYRINGE (ML) INJECTION PRN
Status: DISCONTINUED | OUTPATIENT
Start: 2022-11-14 | End: 2022-11-19 | Stop reason: HOSPADM

## 2022-11-14 RX ORDER — ACETAMINOPHEN 325 MG/1
650 TABLET ORAL EVERY 6 HOURS PRN
Status: DISCONTINUED | OUTPATIENT
Start: 2022-11-14 | End: 2022-11-19 | Stop reason: HOSPADM

## 2022-11-14 RX ORDER — GABAPENTIN 300 MG/1
300 CAPSULE ORAL 3 TIMES DAILY
Status: ON HOLD | COMMUNITY
End: 2022-11-16 | Stop reason: HOSPADM

## 2022-11-14 RX ORDER — QUETIAPINE FUMARATE 25 MG/1
25 TABLET, FILM COATED ORAL 2 TIMES DAILY
Status: DISCONTINUED | OUTPATIENT
Start: 2022-11-14 | End: 2022-11-14

## 2022-11-14 RX ORDER — MELOXICAM 7.5 MG/1
15 TABLET ORAL DAILY
Status: DISCONTINUED | OUTPATIENT
Start: 2022-11-14 | End: 2022-11-14

## 2022-11-14 RX ORDER — DULOXETIN HYDROCHLORIDE 60 MG/1
60 CAPSULE, DELAYED RELEASE ORAL DAILY
Status: DISCONTINUED | OUTPATIENT
Start: 2022-11-14 | End: 2022-11-14

## 2022-11-14 RX ORDER — ENOXAPARIN SODIUM 100 MG/ML
40 INJECTION SUBCUTANEOUS DAILY
Status: DISCONTINUED | OUTPATIENT
Start: 2022-11-15 | End: 2022-11-19 | Stop reason: HOSPADM

## 2022-11-14 RX ORDER — ACETAMINOPHEN 650 MG/1
650 SUPPOSITORY RECTAL EVERY 6 HOURS PRN
Status: DISCONTINUED | OUTPATIENT
Start: 2022-11-14 | End: 2022-11-19 | Stop reason: HOSPADM

## 2022-11-14 RX ORDER — POLYETHYLENE GLYCOL 3350 17 G/17G
17 POWDER, FOR SOLUTION ORAL DAILY PRN
Status: DISCONTINUED | OUTPATIENT
Start: 2022-11-14 | End: 2022-11-19 | Stop reason: HOSPADM

## 2022-11-14 RX ORDER — FLUTICASONE PROPIONATE 50 MCG
1 SPRAY, SUSPENSION (ML) NASAL DAILY
Status: DISCONTINUED | OUTPATIENT
Start: 2022-11-14 | End: 2022-11-14

## 2022-11-14 RX ORDER — SODIUM CHLORIDE 9 MG/ML
INJECTION, SOLUTION INTRAVENOUS CONTINUOUS
Status: DISCONTINUED | OUTPATIENT
Start: 2022-11-14 | End: 2022-11-15

## 2022-11-14 RX ORDER — LANOLIN ALCOHOL/MO/W.PET/CERES
3 CREAM (GRAM) TOPICAL NIGHTLY
Status: DISCONTINUED | OUTPATIENT
Start: 2022-11-14 | End: 2022-11-14

## 2022-11-14 RX ORDER — POLYVINYL ALCOHOL 14 MG/ML
1 SOLUTION/ DROPS OPHTHALMIC EVERY 4 HOURS PRN
Status: DISCONTINUED | OUTPATIENT
Start: 2022-11-14 | End: 2022-11-19 | Stop reason: HOSPADM

## 2022-11-14 RX ORDER — ENOXAPARIN SODIUM 100 MG/ML
40 INJECTION SUBCUTANEOUS DAILY
Status: DISCONTINUED | OUTPATIENT
Start: 2022-11-14 | End: 2022-11-14

## 2022-11-14 RX ORDER — GABAPENTIN 400 MG/1
400 CAPSULE ORAL 3 TIMES DAILY
Status: DISCONTINUED | OUTPATIENT
Start: 2022-11-14 | End: 2022-11-19 | Stop reason: HOSPADM

## 2022-11-14 RX ORDER — DOCUSATE SODIUM 100 MG/1
100 CAPSULE, LIQUID FILLED ORAL 3 TIMES DAILY
Status: DISCONTINUED | OUTPATIENT
Start: 2022-11-14 | End: 2022-11-14

## 2022-11-14 RX ORDER — SODIUM CHLORIDE 9 MG/ML
INJECTION, SOLUTION INTRAVENOUS PRN
Status: DISCONTINUED | OUTPATIENT
Start: 2022-11-14 | End: 2022-11-19 | Stop reason: HOSPADM

## 2022-11-14 RX ORDER — SODIUM CHLORIDE 0.9 % (FLUSH) 0.9 %
5-40 SYRINGE (ML) INJECTION EVERY 12 HOURS SCHEDULED
Status: DISCONTINUED | OUTPATIENT
Start: 2022-11-14 | End: 2022-11-19 | Stop reason: HOSPADM

## 2022-11-14 RX ADMIN — CEFEPIME 2000 MG: 2 INJECTION, POWDER, FOR SOLUTION INTRAVENOUS at 09:53

## 2022-11-14 RX ADMIN — CEFEPIME 2000 MG: 2 INJECTION, POWDER, FOR SOLUTION INTRAVENOUS at 17:07

## 2022-11-14 RX ADMIN — GABAPENTIN 400 MG: 400 CAPSULE ORAL at 20:08

## 2022-11-14 RX ADMIN — SODIUM CHLORIDE: 9 INJECTION, SOLUTION INTRAVENOUS at 21:42

## 2022-11-14 RX ADMIN — VANCOMYCIN HYDROCHLORIDE 1750 MG: 1 INJECTION, POWDER, LYOPHILIZED, FOR SOLUTION INTRAVENOUS at 02:36

## 2022-11-14 RX ADMIN — VANCOMYCIN HYDROCHLORIDE 1000 MG: 1 INJECTION, POWDER, LYOPHILIZED, FOR SOLUTION INTRAVENOUS at 14:53

## 2022-11-14 RX ADMIN — CEFEPIME 2000 MG: 2 INJECTION, POWDER, FOR SOLUTION INTRAVENOUS at 00:58

## 2022-11-14 RX ADMIN — SODIUM CHLORIDE: 9 INJECTION, SOLUTION INTRAVENOUS at 00:57

## 2022-11-14 ASSESSMENT — ENCOUNTER SYMPTOMS: ABDOMINAL PAIN: 0

## 2022-11-14 ASSESSMENT — PAIN SCALES - GENERAL: PAINLEVEL_OUTOF10: 6

## 2022-11-14 NOTE — PLAN OF CARE
Patient non complaint with assessment tonight, patient refused clothing change and care. Patient did let staff give medications and take vitals, Patient wanted to be left alone and wrapped up in multiple blankets, suprapubic cath draining yellow urine and colostomy bag intact. No new s/s of skin breakdown or injury. Problem: Discharge Planning  Goal: Discharge to home or other facility with appropriate resources  Outcome: Progressing  Flowsheets (Taken 11/14/2022 0002)  Discharge to home or other facility with appropriate resources: Identify barriers to discharge with patient and caregiver     Problem: Skin/Tissue Integrity  Goal: Absence of new skin breakdown  Description: 1. Monitor for areas of redness and/or skin breakdown  2. Assess vascular access sites hourly  3. Every 4-6 hours minimum:  Change oxygen saturation probe site  4. Every 4-6 hours:  If on nasal continuous positive airway pressure, respiratory therapy assess nares and determine need for appliance change or resting period.   Outcome: Progressing     Problem: Wound:  Goal: Will show signs of wound healing; wound closure and no evidence of infection  Description: Will show signs of wound healing; wound closure and no evidence of infection  Outcome: Progressing     Problem: Pain  Goal: Verbalizes/displays adequate comfort level or baseline comfort level  Outcome: Progressing

## 2022-11-14 NOTE — ED NOTES
Pt states he also has a UTI.  Pt states he just finished antibiotics and as soon as he finishes the antibiotics he gets another UTI     Genoveva Schroeder RN  11/13/22 2754

## 2022-11-14 NOTE — RT PROTOCOL NOTE
RT Nebulizer Bronchodilator Protocol Note    There is a bronchodilator order in the chart from a provider indicating to follow the RT Bronchodilator Protocol and there is an Initiate RT Bronchodilator Protocol order as well (see protocol at bottom of note). CXR Findings:  No results found. The findings from the last RT Protocol Assessment were as follows:  Smoking: None or smoker <15 pack years  Respiratory Pattern: Regular pattern and RR 12-20 bpm  Breath Sounds: Clear breath sounds  Cough: Strong, spontaneous, non-productive  Indication for Bronchodilator Therapy: None  Bronchodilator Assessment Score: 0    Aerosolized bronchodilator medication orders have been revised according to the RT Nebulizer Bronchodilator Protocol below. Respiratory Therapist to perform RT Therapy Protocol Assessment initially then follow the protocol. Repeat RT Therapy Protocol Assessment PRN for score 0-3 or on second treatment, BID, and PRN for scores above 3. No Indications - adjust the frequency to every 6 hours PRN wheezing or bronchospasm, if no treatments needed after 48 hours then discontinue using Per Protocol order mode. If indication present, adjust the RT bronchodilator orders based on the Bronchodilator Assessment Score as indicated below. If a patient is on this medication at home then do not decrease Frequency below that used at home. 0-3 - enter or revise RT bronchodilator order(s) to equivalent RT Bronchodilator order with Frequency of every 4 hours PRN for wheezing or increased work of breathing using Per Protocol order mode. 4-6 - enter or revise RT Bronchodilator order(s) to two equivalent RT bronchodilator orders with one order with BID Frequency and one order with Frequency of every 4 hours PRN wheezing or increased work of breathing using Per Protocol order mode.          7-10 - enter or revise RT Bronchodilator order(s) to two equivalent RT bronchodilator orders with one order with TID Frequency and one order with Frequency of every 4 hours PRN wheezing or increased work of breathing using Per Protocol order mode. 11-13 - enter or revise RT Bronchodilator order(s) to one equivalent RT bronchodilator order with QID Frequency and an Albuterol order with Frequency of every 4 hours PRN wheezing or increased work of breathing using Per Protocol order mode. Greater than 13 - enter or revise RT Bronchodilator order(s) to one equivalent RT bronchodilator order with every 4 hours Frequency and an Albuterol order with Frequency of every 2 hours PRN wheezing or increased work of breathing using Per Protocol order mode. RT to enter RT Home Evaluation for COPD & MDI Assessment order using Per Protocol order mode. Electronically signed by Norris Zamudio RCP on 11/14/2022 at [address] Bronchodilator Protocol Note    There is a bronchodilator order in the chart from a provider indicating to follow the RT Bronchodilator Protocol and there is an Initiate RT Bronchodilator Protocol order as well (see protocol at bottom of note). CXR Findings:  No results found. The findings from the last RT Protocol Assessment were as follows:  Smoking: None or smoker <15 pack years  Respiratory Pattern: Regular pattern and RR 12-20 bpm  Breath Sounds: Clear breath sounds  Cough: Strong, spontaneous, non-productive  Indication for Bronchodilator Therapy: None  Bronchodilator Assessment Score: 0    Aerosolized bronchodilator medication orders have been revised according to the RT Nebulizer Bronchodilator Protocol below. Respiratory Therapist to perform RT Therapy Protocol Assessment initially then follow the protocol. Repeat RT Therapy Protocol Assessment PRN for score 0-3 or on second treatment, BID, and PRN for scores above 3.     No Indications - adjust the frequency to every 6 hours PRN wheezing or bronchospasm, if no treatments needed after 48 hours then discontinue using Per Protocol order mode. If indication present, adjust the RT bronchodilator orders based on the Bronchodilator Assessment Score as indicated below. If a patient is on this medication at home then do not decrease Frequency below that used at home. 0-3 - enter or revise RT bronchodilator order(s) to equivalent RT Bronchodilator order with Frequency of every 4 hours PRN for wheezing or increased work of breathing using Per Protocol order mode. 4-6 - enter or revise RT Bronchodilator order(s) to two equivalent RT bronchodilator orders with one order with BID Frequency and one order with Frequency of every 4 hours PRN wheezing or increased work of breathing using Per Protocol order mode. 7-10 - enter or revise RT Bronchodilator order(s) to two equivalent RT bronchodilator orders with one order with TID Frequency and one order with Frequency of every 4 hours PRN wheezing or increased work of breathing using Per Protocol order mode. 11-13 - enter or revise RT Bronchodilator order(s) to one equivalent RT bronchodilator order with QID Frequency and an Albuterol order with Frequency of every 4 hours PRN wheezing or increased work of breathing using Per Protocol order mode. Greater than 13 - enter or revise RT Bronchodilator order(s) to one equivalent RT bronchodilator order with every 4 hours Frequency and an Albuterol order with Frequency of every 2 hours PRN wheezing or increased work of breathing using Per Protocol order mode. RT to enter RT Home Evaluation for COPD & MDI Assessment order using Per Protocol order mode.     Electronically signed by Glo Pierce RCP on 11/14/2022 at 4:14 AM

## 2022-11-14 NOTE — ED NOTES
Pt caregiver states he has a sore on each butt cheek. Pt caregiver states they were dressed this evening and are clean.       Aaliyah Ann RN  11/13/22 2407
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
## 2022-11-14 NOTE — ED NOTES
Pt presents to ER for suprapubic catheter problem and a UTI. Pt states his suprapubic catheter came out about an hour prior to arrival. Pt presents to ER with caregiver. Pt is a paraplegic. Pt is diaphoretic, hypotensive, and tachycardic. Pt states this happens often.       Cindy Engle RN  11/13/22 2072

## 2022-11-14 NOTE — CONSULTS
[facility] Pharmacokinetic Monitoring Service - Vancomycin     Bolivar Emmanuel is a 20 y.o. male starting on vancomycin therapy for UTI. Pharmacy consulted by VIKKI Jerome NP for monitoring and adjustment. Target Concentration: Goal trough of 10-15 mg/L and AUC/JEREMY <500 mg*hr/L    Additional Antimicrobials: Cefepime    Pertinent Laboratory Values: Wt Readings from Last 1 Encounters:   11/13/22 145 lb (65.8 kg)     Temp Readings from Last 1 Encounters:   11/14/22 99.5 °F (37.5 °C) (Oral)     Estimated Creatinine Clearance: 110 mL/min (based on SCr of 0.91 mg/dL). Recent Labs     11/13/22  2155   CREATININE 0.91   WBC 14.4*     Procalcitonin: ---    Pertinent Cultures:  Culture Date Source Results   11/13/22 11/13/22 Blood x2  urine In process  In process   MRSA Nasal Swab: N/A. Non-respiratory infection.     Plan:  Dosing recommendations based on Bayesian software  Start vancomycin 1750 mg x1 loading dose, followed by 1000 mg every 12 hours  Anticipated AUC of 451 and trough concentration of 13 at steady state  Renal labs as indicated   Vancomycin concentration ordered for  11/14/22@ 2000   Pharmacy will continue to monitor patient and adjust therapy as indicated    Thank you for the consult,  DIANA Dhillon [facility]  11/14/2022 1:00 AM v
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
## 2022-11-14 NOTE — PROGRESS NOTES
[] Medication Reconciliation was completed and the patient's home medication list was verified. The Med List Status is \"Complete\". The following sources were used to assist with Medication Reconciliation:    [] Patient had a list of medications which was transcribed into the EHR. [] Patient provided bottles of their medications    [] Home medications reviewed and confirmed with     [] Contacted patient's pharmacy to confirm home medications    [] Contacted patient's physician office to confirm home medications    [] Medical Records from another facility and/or Care Everywhere were reviewed      [] There are one or more home medications that need clarification before Medication Reconciliation can be completed. The Med List Status has been marked as In Progress. To assist with Home Medication Reconciliation the following actions have been taken:    [x] Pharmacy medication reconciliation service requested.  (Note: This can be done by sending a Perfect Serve message to [facility] Pharmacist or by Mac Denise [phone].)  [] Family requested to bring medications into the hospital  [] Family requested to call hospital with medication list  [] Message left with physician office  [] Request for medical records made to   [] Other

## 2022-11-14 NOTE — PROGRESS NOTES
Transitions of Care Pharmacy Service   Medication Review    The patient's list of current home medications has been reviewed. Source(s) of information: patient, Care Everywhere, Surescripts refill report, OARRS      Other Notes Gabapentin was recently increased to 400mg TID on 11/2/22 per [facility] discharge AVS. Pt states he hasn't started the new dose yet -- he is still taking prior dose 300mg TID at home. PROVIDER ACTION REQUESTED  Medications that need to be addressed by a physician/nurse practitioner:    Medication Action Requested   Eliquis 5mg,  Colace 100mg,  Cymbalta 60mg,  Famotidine 20mg,  Flonase,  Melatonin 3mg,  Mobic 15mg,  Liquifilm tears,  Seroquel 25mg,  Saline nasal spray,  Zonegran 100mg     Not current home meds (old prescriptions from 2021 or earlier) -- consider discontinuing as appropriate, otherwise he will need new prescriptions to restart at discharge             Please feel free to call me with any questions about this encounter. Thank you. Ally Najera [facility]   Transitions of Care Pharmacy Service  Phone:  [phone]  Fax: [phone]      Electronically signed by Ally Najera [facility] on 11/14/2022 at 1:29 PM           Medications Prior to Admission:   gabapentin (NEURONTIN) 300 MG capsule, Take 300 mg by mouth 3 times daily. gabapentin (NEURONTIN) 400 MG capsule, Take 400 mg by mouth 3 times daily.  (Dose increased 11/2/22, hasn't started yet)  acetaminophen (TYLENOL) 325 MG tablet, Take 15 mg/kg by mouth every 6 hours as needed for Pain
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
## 2022-11-14 NOTE — PROGRESS NOTES
Occupational [address]  Occupational Therapy Not Seen Note    Patient not available for Occupational Therapy due to:    [] Testing:    [] Hemodialysis    [] Cancelled by RN:    [x] Refusal by Patient: (CX) Attempted to see pt for OT eval this AM. Pt is refusing therapy at this time. Will continue to follow.     [] Surgery:     [] Intubation:     [] Pain Medication:    [] Sedation:     [] Spine Precautions :    [] Medical Instability:    [] Other:      Kiersten Mariee OT

## 2022-11-14 NOTE — PROGRESS NOTES
Pt arrived to unit from ED. Vitals taken. See Mar. Admission and assessment complete. No distress noted. pt placed on telemerty, call light given, pt oriented to room. Safety measures in place. Waffle cushion to mattress. POC initiated and reviewed with patient.
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
## 2022-11-15 PROCEDURE — 97110 THERAPEUTIC EXERCISES: CPT

## 2022-11-15 PROCEDURE — 97167 OT EVAL HIGH COMPLEX 60 MIN: CPT

## 2022-11-15 PROCEDURE — 6370000000 HC RX 637 (ALT 250 FOR IP): Performed by: NURSE PRACTITIONER

## 2022-11-15 PROCEDURE — 97530 THERAPEUTIC ACTIVITIES: CPT

## 2022-11-15 PROCEDURE — 6360000002 HC RX W HCPCS: Performed by: NURSE PRACTITIONER

## 2022-11-15 PROCEDURE — 6360000002 HC RX W HCPCS: Performed by: INTERNAL MEDICINE

## 2022-11-15 PROCEDURE — 6370000000 HC RX 637 (ALT 250 FOR IP): Performed by: INTERNAL MEDICINE

## 2022-11-15 PROCEDURE — 2580000003 HC RX 258: Performed by: NURSE PRACTITIONER

## 2022-11-15 PROCEDURE — 97535 SELF CARE MNGMENT TRAINING: CPT

## 2022-11-15 PROCEDURE — 2060000000 HC ICU INTERMEDIATE R&B

## 2022-11-15 RX ORDER — CIPROFLOXACIN 500 MG/1
500 TABLET, FILM COATED ORAL EVERY 12 HOURS SCHEDULED
Status: DISCONTINUED | OUTPATIENT
Start: 2022-11-15 | End: 2022-11-16

## 2022-11-15 RX ADMIN — ENOXAPARIN SODIUM 40 MG: 100 INJECTION SUBCUTANEOUS at 08:44

## 2022-11-15 RX ADMIN — PROBIOTIC PRODUCT - TAB 1 TABLET: TAB at 17:01

## 2022-11-15 RX ADMIN — GABAPENTIN 400 MG: 400 CAPSULE ORAL at 12:53

## 2022-11-15 RX ADMIN — VANCOMYCIN HYDROCHLORIDE 1250 MG: 5 INJECTION, POWDER, LYOPHILIZED, FOR SOLUTION INTRAVENOUS at 00:17

## 2022-11-15 RX ADMIN — PROBIOTIC PRODUCT - TAB 1 TABLET: TAB at 12:53

## 2022-11-15 RX ADMIN — CEFEPIME 2000 MG: 2 INJECTION, POWDER, FOR SOLUTION INTRAVENOUS at 08:50

## 2022-11-15 RX ADMIN — GABAPENTIN 400 MG: 400 CAPSULE ORAL at 20:57

## 2022-11-15 RX ADMIN — CEFEPIME 2000 MG: 2 INJECTION, POWDER, FOR SOLUTION INTRAVENOUS at 02:30

## 2022-11-15 RX ADMIN — FAMOTIDINE 20 MG: 20 TABLET, FILM COATED ORAL at 08:43

## 2022-11-15 RX ADMIN — CIPROFLOXACIN 500 MG: 500 TABLET ORAL at 20:57

## 2022-11-15 RX ADMIN — PROBIOTIC PRODUCT - TAB 1 TABLET: TAB at 08:42

## 2022-11-15 RX ADMIN — GABAPENTIN 400 MG: 400 CAPSULE ORAL at 08:43

## 2022-11-15 RX ADMIN — VANCOMYCIN HYDROCHLORIDE 1250 MG: 5 INJECTION, POWDER, LYOPHILIZED, FOR SOLUTION INTRAVENOUS at 12:56
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
## 2022-11-15 NOTE — PLAN OF CARE
Problem: Discharge Planning  Goal: Discharge to home or other facility with appropriate resources  Outcome: Progressing     Problem: Skin/Tissue Integrity  Goal: Absence of new skin breakdown  Description: 1.   Monitor for areas of redness and/or skin breakdown  11/15/2022 0907 by Holley Nguyen RN  Outcome: Progressing     Problem: Pain  Goal: Verbalizes/displays adequate comfort level or baseline comfort level  Outcome: Progressing     Problem: Wound:  Goal: Will show signs of wound healing; wound closure and no evidence of infection  Description: Will show signs of wound healing; wound closure and no evidence of infection  11/15/2022 0907 by Holley Nguyen RN  Outcome: Progressing     Problem: Safety - Adult  Goal: Free from fall injury  Outcome: Progressing no chest pain, no cough, and no shortness of breath.
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
## 2022-11-15 NOTE — PROGRESS NOTES
[address] [facility] Pharmacokinetic Monitoring Service - Vancomycin    Consulting Provider:  VIKKI Jerome NP  Indication: UTI  Target Concentration: Goal trough of 10-15 mg/L and AUC/JEREMY <500 mg*hr/L  Day of Therapy: 2  Additional Antimicrobials: cefepime    Pertinent Laboratory Values: Wt Readings from Last 1 Encounters:   11/13/22 145 lb (65.8 kg)     Temp Readings from Last 1 Encounters:   11/14/22 98.3 °F (36.8 °C) (Oral)     Estimated Creatinine Clearance: 189 mL/min (A) (based on SCr of 0.53 mg/dL (L)). Recent Labs     11/13/22  2155 11/14/22  0513   CREATININE 0.91 0.53*   WBC 14.4*  --      Procalcitonin: 0.24 on 11/14/22    Pertinent Cultures:  Culture Date Source Results   11/13/22 11/13/22 Urine  Blood x2 In process  No growth 12hrs, pending   MRSA Nasal Swab: N/A. Non-respiratory infection. Recent vancomycin administrations                     vancomycin 1000 mg IVPB in 250 mL D5W addavial (mg) 1,000 mg New Bag 11/14/22 1453    vancomycin (VANCOCIN) 1,750 mg in dextrose 5 % 500 mL IVPB (mg) 1,750 mg New Bag 11/14/22 0236                    Assessment:  Date/Time Current Dose Concentration Timing of Concentration (h) AUC   11/14/22 1000mg q12h 12.6 6h 53m 366   Note: Serum concentrations collected for AUC dosing may appear elevated if collected in close proximity to the dose administered, this is not necessarily an indication of toxicity    Plan:  Current dosing regimen is sub-therapeutic  Increase dose to 1250 mg every 12 hours . Anticipated  and trough 12.2 at steady state.   Repeat vancomycin concentration ordered for 11/16/22 @ 0600   Pharmacy will continue to monitor patient and adjust therapy as indicated    Thank you for the consult,  DIANA Dhillon [facility]  11/14/2022 10:33 PM

## 2022-11-15 NOTE — PLAN OF CARE
Problem: Skin/Tissue Integrity  Goal: Absence of new skin breakdown  Description: 1. Monitor for areas of redness and/or skin breakdown  2. Assess vascular access sites hourly  3. Every 4-6 hours minimum:  Change oxygen saturation probe site  4. Every 4-6 hours:  If on nasal continuous positive airway pressure, respiratory therapy assess nares and determine need for appliance change or resting period.   Outcome: Progressing      Problem: Wound:  Goal: Will show signs of wound healing; wound closure and no evidence of infection  Description: Will show signs of wound healing; wound closure and no evidence of infection  Outcome: Progressing
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
## 2022-11-15 NOTE — PROGRESS NOTES
Pt called out and said arm was hurting while Vanco was infusing. Writer assessed and IV infiltrated. Infusion stopped. Left arm elevated and warm compress applied. IV removed and pharmacy called. States nothing further to be done. Will continue to monitor the site closely. Pt is not agreeable to new IV being placed. States this has never happened before and is upset. Dr Marvin notified.
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
## 2022-11-15 NOTE — PROGRESS NOTES
[address] [facility] Pharmacokinetic Monitoring Service - Vancomycin    Consulting Provider:  VIKKI Pastor NP  Indication: UTI  Target Concentration: Goal trough of 10-15 mg/L and AUC/JEREMY <500 mg*hr/L  Day of Therapy: 2  Additional Antimicrobials: cefepime    Pertinent Laboratory Values: Wt Readings from Last 1 Encounters:   11/13/22 145 lb (65.8 kg)     Temp Readings from Last 1 Encounters:   11/14/22 98.3 °F (36.8 °C) (Oral)     Estimated Creatinine Clearance: 189 mL/min (A) (based on SCr of 0.53 mg/dL (L)). Recent Labs     11/13/22  2155 11/14/22  0513   CREATININE 0.91 0.53*   WBC 14.4*  --      Procalcitonin: 0.24 on 11/14/22    Pertinent Cultures:  Culture Date Source Results   11/13/22 11/13/22 Urine  Blood x2 In process  No growth    MRSA Nasal Swab: N/A. Non-respiratory infection. Recent vancomycin administrations                     vancomycin 1000 mg IVPB in 250 mL D5W addavial (mg) 1,000 mg New Bag 11/14/22 1453    vancomycin (VANCOCIN) 1,750 mg in dextrose 5 % 500 mL IVPB (mg) 1,750 mg New Bag 11/14/22 0236                    Assessment:  Date/Time Current Dose Concentration Timing of Concentration (h) AUC   11/14/22 1000mg q12h 12.6 6h 53m 366   Note: Serum concentrations collected for AUC dosing may appear elevated if collected in close proximity to the dose administered, this is not necessarily an indication of toxicity    Plan:  Current dosing regimen is vancomycin 1250mg ivpb q12h    Anticipated  and trough 12.2 at steady state.   Repeat vancomycin concentration ordered for 11/16/22 @ 0600   Pharmacy will continue to monitor patient and adjust therapy as indicated    Thank you for the consult,  DIANA Pretty [facility]  11/15/2022 10:13 AM

## 2022-11-16 PROBLEM — L89.324: Status: ACTIVE | Noted: 2022-11-16

## 2022-11-16 PROBLEM — L89.314: Status: ACTIVE | Noted: 2022-11-16

## 2022-11-16 LAB
CULTURE: ABNORMAL
SPECIMEN DESCRIPTION: ABNORMAL

## 2022-11-16 PROCEDURE — 6360000002 HC RX W HCPCS: Performed by: INTERNAL MEDICINE

## 2022-11-16 PROCEDURE — 6370000000 HC RX 637 (ALT 250 FOR IP): Performed by: NURSE PRACTITIONER

## 2022-11-16 PROCEDURE — 2060000000 HC ICU INTERMEDIATE R&B

## 2022-11-16 PROCEDURE — 6370000000 HC RX 637 (ALT 250 FOR IP): Performed by: INTERNAL MEDICINE

## 2022-11-16 RX ORDER — POLYVINYL ALCOHOL 14 MG/ML
1 SOLUTION/ DROPS OPHTHALMIC 4 TIMES DAILY PRN
Refills: 4 | COMMUNITY
Start: 2022-11-16

## 2022-11-16 RX ORDER — POLYETHYLENE GLYCOL 3350 17 G/17G
17 POWDER, FOR SOLUTION ORAL DAILY PRN
Qty: 527 G | Refills: 1 | COMMUNITY
Start: 2022-11-16

## 2022-11-16 RX ORDER — CEPHALEXIN 500 MG/1
500 CAPSULE ORAL 4 TIMES DAILY
Qty: 16 CAPSULE | Refills: 0 | Status: SHIPPED | OUTPATIENT
Start: 2022-11-16 | End: 2022-11-18 | Stop reason: HOSPADM

## 2022-11-16 RX ORDER — CEPHALEXIN 500 MG/1
500 CAPSULE ORAL EVERY 6 HOURS SCHEDULED
Status: DISCONTINUED | OUTPATIENT
Start: 2022-11-16 | End: 2022-11-18

## 2022-11-16 RX ADMIN — CEPHALEXIN 500 MG: 500 CAPSULE ORAL at 19:11

## 2022-11-16 RX ADMIN — PROBIOTIC PRODUCT - TAB 1 TABLET: TAB at 12:58

## 2022-11-16 RX ADMIN — CEPHALEXIN 500 MG: 500 CAPSULE ORAL at 13:59

## 2022-11-16 RX ADMIN — CEPHALEXIN 500 MG: 500 CAPSULE ORAL at 23:35

## 2022-11-16 RX ADMIN — PROBIOTIC PRODUCT - TAB 1 TABLET: TAB at 09:12

## 2022-11-16 RX ADMIN — PROBIOTIC PRODUCT - TAB 1 TABLET: TAB at 19:12

## 2022-11-16 RX ADMIN — GABAPENTIN 400 MG: 400 CAPSULE ORAL at 12:58

## 2022-11-16 RX ADMIN — FAMOTIDINE 20 MG: 20 TABLET, FILM COATED ORAL at 09:12

## 2022-11-16 RX ADMIN — GABAPENTIN 400 MG: 400 CAPSULE ORAL at 09:12

## 2022-11-16 RX ADMIN — CIPROFLOXACIN 500 MG: 500 TABLET ORAL at 09:12

## 2022-11-16 RX ADMIN — ENOXAPARIN SODIUM 40 MG: 100 INJECTION SUBCUTANEOUS at 09:12

## 2022-11-16 RX ADMIN — GABAPENTIN 400 MG: 400 CAPSULE ORAL at 20:52

## 2022-11-16 ASSESSMENT — PAIN SCALES - GENERAL: PAINLEVEL_OUTOF10: 6
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
## 2022-11-16 NOTE — PROGRESS NOTES
Writer reached out to wound care for orders that were placed on 11/14. Wound care said they would see pt first when they arrive.
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
## 2022-11-16 NOTE — CARE COORDINATION
Social Work-Met with patient to discuss short term SNF for wound care. He is agreeable. Reviewed list with patient. He would like a referral sent to 1) [facility],2) [facility]. Sent referrals.  Ellen Shea

## 2022-11-16 NOTE — PROGRESS NOTES
Bed bath given with CHG soap, dressings changed to bottom, electrode stickers changed and powder placed on all areas of body to decrease the amount of moisture to skin caused by excessive diaphoresis. Bed is locked and in lowest position, call light is in reach and safety maintained.

## 2022-11-16 NOTE — CARE COORDINATION
Discharge planning    Patient to be discharged home. Message to [facility] to see if can accept. RN updated writer that patient now wants snf. He is medicaid. Updated attending and will need CASIE signed for level of care with the state. Met with patient and he is now declining snf. Discussed  home care and he is agreeable to that. Patient has been declined by MED and [facility]. Sent referral to [facility] and they have also declined. Send referral to  notified [place] of the referral.     [id number]  Denied by  . Call to caretenders to see if can accept straight medicaid. They do not. Call to [facility] and they declined him due to non compliance in the past and will not take back. Call to [facility] and they do accept. Sent over face sheet and progress notes. Met back with patient to further discuss possible option of snf if cannot secure home care he is refusing to give an answer. Asked if writer could come back in half hour.

## 2022-11-16 NOTE — PLAN OF CARE
Pt no complaints of pain. Pressure wound dressing changed. Ostomy bag changed d/t leaking. AO x4. Diaphoretic, no temp noted throughout the night. Turned pt frequently to help with pressure wounds on bottom  Problem: Skin/Tissue Integrity  Goal: Absence of new skin breakdown  Description: 1. Monitor for areas of redness and/or skin breakdown  2. Assess vascular access sites hourly  3. Every 4-6 hours minimum:  Change oxygen saturation probe site  4. Every 4-6 hours:  If on nasal continuous positive airway pressure, respiratory therapy assess nares and determine need for appliance change or resting period.   Outcome: Progressing     Problem: ABCDS Injury Assessment  Goal: Absence of physical injury  Outcome: Progressing     Problem: Pain  Goal: Verbalizes/displays adequate comfort level or baseline comfort level  Outcome: Progressing

## 2022-11-16 NOTE — PROGRESS NOTES
CLINICAL PHARMACY NOTE: MEDS TO BEDS    Total # of Prescriptions Filled: 1   The following medications were delivered to the patient:  Cephalexin 500mg    Additional Documentation:
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
## 2022-11-17 ENCOUNTER — APPOINTMENT (OUTPATIENT)
Dept: GENERAL RADIOLOGY | Age: 22
DRG: 466 | End: 2022-11-17
Payer: MEDICAID

## 2022-11-17 ENCOUNTER — APPOINTMENT (OUTPATIENT)
Dept: MRI IMAGING | Age: 22
DRG: 466 | End: 2022-11-17
Payer: MEDICAID

## 2022-11-17 PROBLEM — M86.9 OSTEOMYELITIS, PELVIS (HCC): Status: ACTIVE | Noted: 2022-11-17

## 2022-11-17 LAB
C-REACTIVE PROTEIN: 51.4 MG/L (ref 0–5)
SEDIMENTATION RATE, ERYTHROCYTE: 115 MM/HR (ref 0–15)

## 2022-11-17 PROCEDURE — A9579 GAD-BASE MR CONTRAST NOS,1ML: HCPCS | Performed by: INTERNAL MEDICINE

## 2022-11-17 PROCEDURE — 6360000004 HC RX CONTRAST MEDICATION: Performed by: INTERNAL MEDICINE

## 2022-11-17 PROCEDURE — 6370000000 HC RX 637 (ALT 250 FOR IP): Performed by: INTERNAL MEDICINE

## 2022-11-17 PROCEDURE — 72197 MRI PELVIS W/O & W/DYE: CPT

## 2022-11-17 PROCEDURE — 85652 RBC SED RATE AUTOMATED: CPT

## 2022-11-17 PROCEDURE — 6360000002 HC RX W HCPCS: Performed by: INTERNAL MEDICINE

## 2022-11-17 PROCEDURE — 6370000000 HC RX 637 (ALT 250 FOR IP): Performed by: NURSE PRACTITIONER

## 2022-11-17 PROCEDURE — 70030 X-RAY EYE FOR FOREIGN BODY: CPT

## 2022-11-17 PROCEDURE — 99254 IP/OBS CNSLTJ NEW/EST MOD 60: CPT | Performed by: INTERNAL MEDICINE

## 2022-11-17 PROCEDURE — 36415 COLL VENOUS BLD VENIPUNCTURE: CPT

## 2022-11-17 PROCEDURE — 2060000000 HC ICU INTERMEDIATE R&B

## 2022-11-17 PROCEDURE — 86140 C-REACTIVE PROTEIN: CPT

## 2022-11-17 RX ORDER — SODIUM CHLORIDE 0.9 % (FLUSH) 0.9 %
10 SYRINGE (ML) INJECTION ONCE
Status: DISCONTINUED | OUTPATIENT
Start: 2022-11-17 | End: 2022-11-19 | Stop reason: HOSPADM

## 2022-11-17 RX ADMIN — CEPHALEXIN 500 MG: 500 CAPSULE ORAL at 18:06

## 2022-11-17 RX ADMIN — CEPHALEXIN 500 MG: 500 CAPSULE ORAL at 23:15

## 2022-11-17 RX ADMIN — CEPHALEXIN 500 MG: 500 CAPSULE ORAL at 13:57

## 2022-11-17 RX ADMIN — GABAPENTIN 400 MG: 400 CAPSULE ORAL at 21:12

## 2022-11-17 RX ADMIN — PROBIOTIC PRODUCT - TAB 1 TABLET: TAB at 18:06

## 2022-11-17 RX ADMIN — GABAPENTIN 400 MG: 400 CAPSULE ORAL at 13:57

## 2022-11-17 RX ADMIN — FAMOTIDINE 20 MG: 20 TABLET, FILM COATED ORAL at 08:19

## 2022-11-17 RX ADMIN — PROBIOTIC PRODUCT - TAB 1 TABLET: TAB at 13:57

## 2022-11-17 RX ADMIN — GABAPENTIN 400 MG: 400 CAPSULE ORAL at 08:19

## 2022-11-17 RX ADMIN — GADOTERIDOL 13 ML: 279.3 INJECTION, SOLUTION INTRAVENOUS at 12:10

## 2022-11-17 RX ADMIN — PROBIOTIC PRODUCT - TAB 1 TABLET: TAB at 08:19

## 2022-11-17 RX ADMIN — ENOXAPARIN SODIUM 40 MG: 100 INJECTION SUBCUTANEOUS at 08:19

## 2022-11-17 RX ADMIN — CEPHALEXIN 500 MG: 500 CAPSULE ORAL at 05:58
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
## 2022-11-17 NOTE — PLAN OF CARE
Pt turned, linens changed, bathed, and suprapubic cath care completed. Pt resting on his L side currently. Has no complaints of pain. Problem: Discharge Planning  Goal: Discharge to home or other facility with appropriate resources  Outcome: Progressing     Problem: Skin/Tissue Integrity  Goal: Absence of new skin breakdown  Description: 1. Monitor for areas of redness and/or skin breakdown  2. Assess vascular access sites hourly  3. Every 4-6 hours minimum:  Change oxygen saturation probe site  4. Every 4-6 hours:  If on nasal continuous positive airway pressure, respiratory therapy assess nares and determine need for appliance change or resting period.   Outcome: Progressing     Problem: ABCDS Injury Assessment  Goal: Absence of physical injury  Outcome: Progressing     Problem: Pain  Goal: Verbalizes/displays adequate comfort level or baseline comfort level  Outcome: Progressing

## 2022-11-17 NOTE — PLAN OF CARE
Uneventful night for pt, MRI scheduled for today, MRI screening complete information given by patient. Pt tolerated medication this AM, denies pain/discomfort. Bed bath will be given today with a complete bed change and dressing changes to wounds. Bed is locked and in lwest position with call light in reach      Problem: Discharge Planning  Goal: Discharge to home or other facility with appropriate resources  11/17/2022 1135 by Liv Mccormick RN  Outcome: Progressing     Problem: Skin/Tissue Integrity  Goal: Absence of new skin breakdown  Description: 1. Monitor for areas of redness and/or skin breakdown  2. Assess vascular access sites hourly  3. Every 4-6 hours minimum:  Change oxygen saturation probe site  4. Every 4-6 hours:  If on nasal continuous positive airway pressure, respiratory therapy assess nares and determine need for appliance change or resting period.   11/17/2022 1135 by Liv Mccormick RN  Outcome: Progressing     Problem: ABCDS Injury Assessment  Goal: Absence of physical injury  11/17/2022 1135 by Liv Mccormick RN  Outcome: Progressing     Problem: Pain  Goal: Verbalizes/displays adequate comfort level or baseline comfort level  11/17/2022 1135 by Liv Mccormick RN  Outcome: Progressing     Problem: Wound:  Goal: Will show signs of wound healing; wound closure and no evidence of infection  Description: Will show signs of wound healing; wound closure and no evidence of infection  Outcome: Progressing     Problem: Nutrition Deficit:  Goal: Optimize nutritional status  Outcome: Progressing     Problem: Safety - Adult  Goal: Free from fall injury  Outcome: Progressing

## 2022-11-17 NOTE — CARE COORDINATION
Social Work-Spoke with [facility]. They will need to know if patient will dc on IV Atb before accepting.  Denis Kulkarni

## 2022-11-17 NOTE — CARE COORDINATION
DC Planning      Previous CM notes reviewed-appears [facility] may have accepted yesterday however on fax today-states declines. Discussed case with LSW-depending on tx plan pt may be a candidate for LTACH. He has been to [facility] in the past.     ID consulted [id number] on po dt lost IV. 1. Deep soft tissue defects/wounds extending to the bilateral ischial   tuberosities with associated osteomyelitis and osseous destruction of the   ischial tuberosities, left greater than right with associated fluid in the   deep wound defects at the margins of the ischial tuberosities, left greater   than right. 2. Mild-to-moderate myositis of the bilateral adductor, gluteal, and   hamstring musculature as detailed above. 3. Mild bilateral hip osteoarthrosis.
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
## 2022-11-17 NOTE — PROGRESS NOTES
Wounds assessed with Dr. Jill Ha, pt the had a bed bath with CHG soap, applied powder and did a partial linen change then repositioned him.  Pt is resting comfortably in bed

## 2022-11-18 LAB
ABSOLUTE EOS #: 0.09 K/UL (ref 0–0.44)
ABSOLUTE IMMATURE GRANULOCYTE: 0.02 K/UL (ref 0–0.3)
ABSOLUTE LYMPH #: 2.57 K/UL (ref 1.1–3.7)
ABSOLUTE MONO #: 0.38 K/UL (ref 0.1–1.2)
ANION GAP SERPL CALCULATED.3IONS-SCNC: 10 MMOL/L (ref 9–17)
BASOPHILS # BLD: 1 % (ref 0–2)
BASOPHILS ABSOLUTE: 0.04 K/UL (ref 0–0.2)
BUN BLDV-MCNC: 6 MG/DL (ref 6–20)
BUN/CREAT BLD: 14 (ref 9–20)
CALCIUM SERPL-MCNC: 9.6 MG/DL (ref 8.6–10.4)
CHLORIDE BLD-SCNC: 103 MMOL/L (ref 98–107)
CO2: 26 MMOL/L (ref 20–31)
CREAT SERPL-MCNC: 0.42 MG/DL (ref 0.7–1.2)
EOSINOPHILS RELATIVE PERCENT: 1 % (ref 1–4)
GFR SERPL CREATININE-BSD FRML MDRD: >60 ML/MIN/1.73M2
GLUCOSE BLD-MCNC: 101 MG/DL (ref 70–99)
HCT VFR BLD CALC: 38.5 % (ref 40.7–50.3)
HEMOGLOBIN: 11.7 G/DL (ref 13–17)
IMMATURE GRANULOCYTES: 0 %
LYMPHOCYTES # BLD: 34 % (ref 24–43)
MCH RBC QN AUTO: 24.6 PG (ref 25.2–33.5)
MCHC RBC AUTO-ENTMCNC: 30.4 G/DL (ref 28.4–34.8)
MCV RBC AUTO: 80.9 FL (ref 82.6–102.9)
MONOCYTES # BLD: 5 % (ref 3–12)
NRBC AUTOMATED: 0 PER 100 WBC
PDW BLD-RTO: 17.4 % (ref 11.8–14.4)
PLATELET # BLD: 531 K/UL (ref 138–453)
PMV BLD AUTO: 9.4 FL (ref 8.1–13.5)
POTASSIUM SERPL-SCNC: 3.9 MMOL/L (ref 3.7–5.3)
RBC # BLD: 4.76 M/UL (ref 4.21–5.77)
RBC # BLD: ABNORMAL 10*6/UL
SEG NEUTROPHILS: 59 % (ref 36–65)
SEGMENTED NEUTROPHILS ABSOLUTE COUNT: 4.45 K/UL (ref 1.5–8.1)
SODIUM BLD-SCNC: 139 MMOL/L (ref 135–144)
WBC # BLD: 7.6 K/UL (ref 3.5–11.3)

## 2022-11-18 PROCEDURE — 80048 BASIC METABOLIC PNL TOTAL CA: CPT

## 2022-11-18 PROCEDURE — 6360000002 HC RX W HCPCS: Performed by: INTERNAL MEDICINE

## 2022-11-18 PROCEDURE — 99232 SBSQ HOSP IP/OBS MODERATE 35: CPT | Performed by: INTERNAL MEDICINE

## 2022-11-18 PROCEDURE — 6370000000 HC RX 637 (ALT 250 FOR IP): Performed by: INTERNAL MEDICINE

## 2022-11-18 PROCEDURE — 2060000000 HC ICU INTERMEDIATE R&B

## 2022-11-18 PROCEDURE — 2580000003 HC RX 258: Performed by: INTERNAL MEDICINE

## 2022-11-18 PROCEDURE — 36415 COLL VENOUS BLD VENIPUNCTURE: CPT

## 2022-11-18 PROCEDURE — 2580000003 HC RX 258: Performed by: NURSE PRACTITIONER

## 2022-11-18 PROCEDURE — 6370000000 HC RX 637 (ALT 250 FOR IP): Performed by: NURSE PRACTITIONER

## 2022-11-18 PROCEDURE — 85025 COMPLETE CBC W/AUTO DIFF WBC: CPT

## 2022-11-18 RX ADMIN — FAMOTIDINE 20 MG: 20 TABLET, FILM COATED ORAL at 10:50

## 2022-11-18 RX ADMIN — PROBIOTIC PRODUCT - TAB 1 TABLET: TAB at 14:40

## 2022-11-18 RX ADMIN — SODIUM CHLORIDE 1 ML: 9 INJECTION, SOLUTION INTRAVENOUS at 21:54

## 2022-11-18 RX ADMIN — CEPHALEXIN 500 MG: 500 CAPSULE ORAL at 05:16

## 2022-11-18 RX ADMIN — ENOXAPARIN SODIUM 40 MG: 100 INJECTION SUBCUTANEOUS at 10:38

## 2022-11-18 RX ADMIN — GABAPENTIN 400 MG: 400 CAPSULE ORAL at 21:48

## 2022-11-18 RX ADMIN — GABAPENTIN 400 MG: 400 CAPSULE ORAL at 10:37

## 2022-11-18 RX ADMIN — VANCOMYCIN HYDROCHLORIDE 1500 MG: 5 INJECTION, POWDER, LYOPHILIZED, FOR SOLUTION INTRAVENOUS at 21:58

## 2022-11-18 RX ADMIN — PROBIOTIC PRODUCT - TAB 1 TABLET: TAB at 17:51

## 2022-11-18 RX ADMIN — GABAPENTIN 400 MG: 400 CAPSULE ORAL at 14:40

## 2022-11-18 RX ADMIN — PROBIOTIC PRODUCT - TAB 1 TABLET: TAB at 10:37

## 2022-11-18 ASSESSMENT — ENCOUNTER SYMPTOMS
RESPIRATORY NEGATIVE: 1
GASTROINTESTINAL NEGATIVE: 1
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
## 2022-11-18 NOTE — PROGRESS NOTES
Colostomy bag disconnecting from skin, despite periodic emptying of loose stool, 200 to 250 ml at a time. Old colostomy bag removed & new bag securely applied. No leakage noted.   Patient turned in bed every 2 hours, with cleansing upper body & powder applied per patient request.

## 2022-11-18 NOTE — PROGRESS NOTES
Informed by day-shift nurse during change of shift & bedside rounding that patient is refusing IV restart due to previous infiltration, which IV was removed, and that [facility] was notfied. Informed patient of importance of having IV, despite no IV meds ordered. Patient verbalizes understanding, but continues to refuse IV restart. Perfect[employer] message sent to FLORENCE Gerber, on-call NP, of patient's refusal to restart IV & to obtain an order. Message read, but no further orders, except for morning labs, at this time. Will monitor.

## 2022-11-18 NOTE — CARE COORDINATION
Discharge planning    [facility] can accept patient at noon tomorrow. Perfect serve attending to updated. Sent over request with [facility]. RN sent perfect serve to ID to request atb coverage. ID rounds at [facility] and can follow there . Access RN to place PICC line tonight.

## 2022-11-18 NOTE — PLAN OF CARE
Problem: Discharge Planning  Goal: Discharge to home or other facility with appropriate resources  11/17/2022 1958 by Ritu Dawkins RN  Outcome: Progressing  Note: Discharge teaching and instructions for diagnosis/procedure explained with patient using teachback method. Patient voiced understanding regarding prescriptions, follow up appointments, and care of self at home. 11/17/2022 1135 by Irving Dhillon RN  Outcome: Progressing     Problem: Skin/Tissue Integrity  Goal: Absence of new skin breakdown  Description: 1. Monitor for areas of redness and/or skin breakdown  2. Assess vascular access sites hourly  3. Every 4-6 hours minimum:  Change oxygen saturation probe site  4. Every 4-6 hours:  If on nasal continuous positive airway pressure, respiratory therapy assess nares and determine need for appliance change or resting period. 11/17/2022 1958 by Ritu Dawkins RN  Outcome: Progressing  Note: Continuing to monitor for skin integrity risks. Patient independent with turning/repositioning. Turning/repositioning encouraged at least once every 2 hrs, and prn basis. Hygiene care being completed independently per patient; assistance provided when deemed necessary. 11/17/2022 1135 by Irving Dhillon RN  Outcome: Progressing     Problem: ABCDS Injury Assessment  Goal: Absence of physical injury  11/17/2022 1958 by Ritu Dawkins RN  Outcome: Progressing  Note: Non-skid socks in place, up with assistance, bed in lowest position, bed exit & alarm as needed, provide toileting every 2 hours an d as needed. 11/17/2022 1135 by Irving Dhillon RN  Outcome: Progressing     Problem: Pain  Goal: Verbalizes/displays adequate comfort level or baseline comfort level  11/17/2022 1958 by Ritu Dawkins RN  Outcome: Progressing  Note: Monitoring pain with each assessment and prn. MO 0-10 pain scale utilized. Non-pharmacological measures to be encouraged prior to pharmacological measures.     11/17/2022 1135 by Danny Deshpande RN  Outcome: Progressing     Problem: Wound:  Goal: Will show signs of wound healing; wound closure and no evidence of infection  Description: Will show signs of wound healing; wound closure and no evidence of infection  11/17/2022 1958 by Lobo Marie RN  Outcome: Progressing  Note: Assess wound for signs of healing, size, appearance, & drainage. Assess pulses & for pain. 11/17/2022 1135 by Danny Deshpande RN  Outcome: Progressing     Problem: Nutrition Deficit:  Goal: Optimize nutritional status  11/17/2022 1958 by Lobo Marie RN  Outcome: Progressing  Note: Review diet daily and as needed with pt. Provide supplement nutrition as ordered by physician & educate pt. Review nutritional guidelines with pt.   11/17/2022 1135 by Danny Deshpande RN  Outcome: Progressing     Problem: Safety - Adult  Goal: Free from fall injury  11/17/2022 1958 by Lobo Marie RN  Outcome: Progressing  Note: Pt fall risk, fall band present, falling star, safety alarm activated and in use as needed. Hourly rounding performed. Pt encouraged to use call light. See Vin Pickens fall risk assessment.    11/17/2022 1135 by Danny Deshpande RN  Outcome: Progressing

## 2022-11-18 NOTE — PROGRESS NOTES
Comprehensive Nutrition Assessment    Type and Reason for Visit:  Reassess    Nutrition Recommendations/Plan:   Continue current diet and supplements  Monitor PO intakes, weight, and labs. Malnutrition Assessment:  Malnutrition Status:  Insufficient data (11/14/22 2619)      Nutrition Assessment:    Patient asleep at time of visist. Observed breakfast untouched. Writer was told by staff that patient don't eat breakfast, eats lunch and dinner. Needs help with feeding at times. Will continue current diet and supplements. Nutrition Related Findings:    No edema. Active bowel sounds. [facility] Wound Type: None       Current Nutrition Intake & Therapies:    Average Meal Intake: 26-50%, 0%, 51-75%  Average Supplements Intake: 26-50%, 51-75%  ADULT DIET; Regular  ADULT ORAL NUTRITION SUPPLEMENT; Breakfast, Dinner; Standard High Calorie/High Protein Oral Supplement    Anthropometric Measures:  Height: 5' 5\" (165.1 cm)  Ideal Body Weight (IBW): 136 lbs (62 kg)       Current Body Weight: 145 lb (65.8 kg), 106.6 % IBW. Weight Source: Other (Comment) (estimated)  Current BMI (kg/m2): 24.1        Weight Adjustment For: Paraplegia  Total Adjusted Percentage (Calculated): 7.5  Adjusted Ideal Body Weight (lbs) (Calculated): 125.8 lbs  Adjusted Ideal Body Weight (kg) (Calculated): 57.18 kg  Adjusted % Ideal Body Weight (Calculated): 115.3  Adjusted BMI (kg/m2) (Calculated): 25.9  BMI Categories: Normal Weight (BMI 18.5-24. 9)    Estimated Daily Nutrient Needs:  Energy Requirements Based On: Kcal/kg  Weight Used for Energy Requirements: Current  Energy (kcal/day): 6986-5118 kcal (28-30 kcal/kg)  Weight Used for Protein Requirements: Current  Protein (g/day): 79-86 gm of protein (1.2-1.3 gm/kg)       Nutrition Diagnosis:   Inadequate oral intake related to inadequate protein-energy intake as evidenced by poor intake prior to admission    Nutrition Interventions:   Food and/or Nutrient Delivery: Continue Current Diet, Continue Oral Nutrition Supplement  Nutrition Education/Counseling: Education not indicated  Coordination of Nutrition Care: Continue to monitor while inpatient       Goals:  Previous Goal Met: Progressing toward Goal(s)  Goals: PO intake 75% or greater       Nutrition Monitoring and Evaluation:      Food/Nutrient Intake Outcomes: Food and Nutrient Intake, Supplement Intake  Physical Signs/Symptoms Outcomes: Biochemical Data, Fluid Status or Edema, Skin, Weight    Discharge Planning:    Continue current diet, Continue Oral Nutrition Supplement       Some areas of assessment may be incomplete due to COVID-19 precautions    Katelynn Kebede RD, LD  Office phone [phone]

## 2022-11-18 NOTE — PROGRESS NOTES
Writer spoke with Charmaine Elaine from [facility] RN. ETA to [facility] is 1 hour for PICC line placement.

## 2022-11-18 NOTE — CARE COORDINATION
Discharge planning    Patient chart reviewed. ID is following. Recommending I&D for right ischial ulceration. Currently on IV atb and will likely need on discharge. SS is following. Referral to [facility] and rehab but may not be able to accept IV atb. Possible LTAC with atb and dressing changes. Will discuss with patient if has preference. He has been to [facility] in the past.     Sent to both [facility] and [facility] to evaluate and see if will accept insurance. Spoke with patient regarding need for LTAC and still stated he wanted to go home. Explained will continue to see if could find home care agency but all the ones that accept for IV atb have declined. That treatment for his infection cannot be covered by oral medications. Updated ID and he went in to discuss the importance of LTAC on discharge. Patient was agreeable with him at this time. 1030  Call from [facility] and patient has been accepted at [facility] if continues to be agreeable    [phone]  Spoke with attending and patient is clear to discharge to [facility] once ID notes atb course. Call to [facility] and they are checking to see if have a bed today or tomorrow.  [facility] is checking
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
## 2022-11-18 NOTE — PROGRESS NOTES
Physical Therapy  DATE: 2022    NAME: Matthew Yu  MRN: [id number]   : 2000    Patient not seen this date for Physical Therapy due to:      [] Cancel by RN or physician due to:    [] Hemodialysis    [] Critical Lab Value Level     [] Blood transfusion in progress    [] Acute or unstable cardiovascular status   _MAP < 55 or more than >115  _HR < 40 or > 130    [] Acute or unstable pulmonary status   -FiO2 > 60%   _RR < 5 or >40    _O2 sats < 85%    [] Strict Bedrest    [] Off Unit for surgery or procedure    [] Off Unit for testing       [] Pending imaging to R/O fracture    [x] Refusal by Patient (Pt states he does not want PT today, but he does want \"braces for LE's that will facilitate knee ext and ankle support. Spoke w/ Tom Lima RN who will pass this on to the physician.)     [] Other      [] PT being discontinued at this time. Patient independent. No further needs. [] PT being discontinued at this time as the patient has been transferred to hospice care. No further needs.       Haley Irving, PT  13:33

## 2022-11-18 NOTE — PROGRESS NOTES
Writer spoke with [facility] RN for PICC line placement. Spoke with , [place]. Callback number given, will await call with ETA of nurse.

## 2022-11-18 NOTE — PROGRESS NOTES
Surgical resident at bedside, evaluated patient's wounds. Dressing changes performed per orders. ID rounded and explained need for IV ATBs that will require PICC line placement to be completed. Patient agreeable to line placement. IR called, informed that their schedule may not allow them to place line. Clin lead informed, Aric Raymond will call RN access to coordinate line placement.

## 2022-11-19 ENCOUNTER — HOSPITAL ENCOUNTER (OUTPATIENT)
Dept: MEDSURG UNIT | Age: 22
Discharge: HOME | End: 2022-11-19
Attending: INTERNAL MEDICINE | Admitting: INTERNAL MEDICINE

## 2022-11-19 VITALS
DIASTOLIC BLOOD PRESSURE: 71 MMHG | WEIGHT: 145 LBS | HEART RATE: 94 BPM | HEIGHT: 65 IN | SYSTOLIC BLOOD PRESSURE: 107 MMHG | BODY MASS INDEX: 24.16 KG/M2 | RESPIRATION RATE: 12 BRPM | TEMPERATURE: 99 F | OXYGEN SATURATION: 98 %

## 2022-11-19 LAB
BUN BLDV-MCNC: 10 MG/DL (ref 6–20)
CREAT SERPL-MCNC: 0.58 MG/DL (ref 0.7–1.2)
CULTURE: NORMAL
CULTURE: NORMAL
GFR SERPL CREATININE-BSD FRML MDRD: >60 ML/MIN/1.73M2
Lab: NORMAL
Lab: NORMAL
PREALBUMIN: 13.2 MG/DL (ref 20–40)
SPECIMEN DESCRIPTION: NORMAL
SPECIMEN DESCRIPTION: NORMAL

## 2022-11-19 PROCEDURE — 84520 ASSAY OF UREA NITROGEN: CPT

## 2022-11-19 PROCEDURE — 2580000003 HC RX 258: Performed by: PHYSICIAN ASSISTANT

## 2022-11-19 PROCEDURE — 2580000003 HC RX 258: Performed by: INTERNAL MEDICINE

## 2022-11-19 PROCEDURE — 99232 SBSQ HOSP IP/OBS MODERATE 35: CPT | Performed by: INTERNAL MEDICINE

## 2022-11-19 PROCEDURE — 6370000000 HC RX 637 (ALT 250 FOR IP): Performed by: NURSE PRACTITIONER

## 2022-11-19 PROCEDURE — 6370000000 HC RX 637 (ALT 250 FOR IP): Performed by: INTERNAL MEDICINE

## 2022-11-19 PROCEDURE — 84134 ASSAY OF PREALBUMIN: CPT

## 2022-11-19 PROCEDURE — 6360000002 HC RX W HCPCS: Performed by: INTERNAL MEDICINE

## 2022-11-19 PROCEDURE — 2580000003 HC RX 258: Performed by: NURSE PRACTITIONER

## 2022-11-19 PROCEDURE — 6370000000 HC RX 637 (ALT 250 FOR IP): Performed by: SURGERY

## 2022-11-19 PROCEDURE — 82565 ASSAY OF CREATININE: CPT

## 2022-11-19 RX ORDER — ASCORBIC ACID 500 MG
500 TABLET ORAL DAILY
Qty: 30 TABLET | Refills: 3 | DISCHARGE
Start: 2022-11-20

## 2022-11-19 RX ORDER — ENOXAPARIN SODIUM 100 MG/ML
40 INJECTION SUBCUTANEOUS DAILY
DISCHARGE
Start: 2022-11-20

## 2022-11-19 RX ORDER — ASCORBIC ACID 500 MG
500 TABLET ORAL DAILY
Status: DISCONTINUED | OUTPATIENT
Start: 2022-11-19 | End: 2022-11-19 | Stop reason: HOSPADM

## 2022-11-19 RX ORDER — ZINC SULFATE 50(220)MG
50 CAPSULE ORAL DAILY
Status: DISCONTINUED | OUTPATIENT
Start: 2022-11-19 | End: 2022-11-19 | Stop reason: HOSPADM

## 2022-11-19 RX ORDER — MULTIVITAMIN WITH IRON
1 TABLET ORAL DAILY
Status: DISCONTINUED | OUTPATIENT
Start: 2022-11-19 | End: 2022-11-19 | Stop reason: HOSPADM

## 2022-11-19 RX ORDER — ZINC SULFATE 50(220)MG
50 CAPSULE ORAL DAILY
Qty: 30 CAPSULE | Refills: 3 | DISCHARGE
Start: 2022-11-20

## 2022-11-19 RX ORDER — MULTIVITAMIN WITH IRON
1 TABLET ORAL DAILY
Refills: 0 | DISCHARGE
Start: 2022-11-20

## 2022-11-19 RX ADMIN — OXYCODONE HYDROCHLORIDE AND ACETAMINOPHEN 500 MG: 500 TABLET ORAL at 10:03

## 2022-11-19 RX ADMIN — MEROPENEM 1000 MG: 1 INJECTION, POWDER, FOR SOLUTION INTRAVENOUS at 01:27

## 2022-11-19 RX ADMIN — FAMOTIDINE 20 MG: 20 TABLET, FILM COATED ORAL at 08:33

## 2022-11-19 RX ADMIN — Medication 50 MG: at 10:04

## 2022-11-19 RX ADMIN — MULTIVITAMIN TABLET 1 TABLET: TABLET at 10:03

## 2022-11-19 RX ADMIN — MEROPENEM 1000 MG: 1 INJECTION, POWDER, FOR SOLUTION INTRAVENOUS at 00:37

## 2022-11-19 RX ADMIN — PROBIOTIC PRODUCT - TAB 1 TABLET: TAB at 08:33

## 2022-11-19 RX ADMIN — MEROPENEM 1000 MG: 1 INJECTION, POWDER, FOR SOLUTION INTRAVENOUS at 10:07

## 2022-11-19 RX ADMIN — SODIUM CHLORIDE, PRESERVATIVE FREE 10 ML: 5 INJECTION INTRAVENOUS at 10:04

## 2022-11-19 RX ADMIN — ENOXAPARIN SODIUM 40 MG: 100 INJECTION SUBCUTANEOUS at 10:02

## 2022-11-19 RX ADMIN — GABAPENTIN 400 MG: 400 CAPSULE ORAL at 10:03

## 2022-11-19 RX ADMIN — Medication 10 ML: at 10:04

## 2022-11-19 ASSESSMENT — ENCOUNTER SYMPTOMS
GASTROINTESTINAL NEGATIVE: 1
RESPIRATORY NEGATIVE: 1

## 2022-11-19 NOTE — DISCHARGE INSTR - DIET

## 2022-11-19 NOTE — PROGRESS NOTES
General Surgery Progress Note    Subjective:     Patient without complaints. No nausea or vomiting. Voiding well. Tolerating diet and dressing changes being performed as per wound care    Scheduled Meds:   multivitamin  1 tablet Oral Daily    ascorbic acid  500 mg Oral Daily    zinc sulfate  50 mg Oral Daily    meropenem  1,000 mg IntraVENous Q8H    vancomycin (VANCOCIN) intermittent dosing (placeholder)   Other RX Placeholder    vancomycin  1,500 mg IntraVENous Q12H    sodium chloride flush  10 mL IntraVENous Once    sodium chloride flush  5-40 mL IntraVENous 2 times per day    lactobacillus  1 tablet Oral TID WC    famotidine  20 mg Oral Daily    enoxaparin  40 mg SubCUTAneous Daily    gabapentin  400 mg Oral TID    sodium chloride flush  5-40 mL IntraVENous 2 times per day     Continuous Infusions:   sodium chloride 1 mL (11/18/22 2154)     PRN Meds:sodium chloride flush, sodium chloride, acetaminophen **OR** acetaminophen, albuterol, aluminum & magnesium hydroxide-simethicone, polyethylene glycol, polyvinyl alcohol, sodium chloride, sodium chloride flush    Objective:   /88   Pulse 73   Temp 98.2 °F (36.8 °C)   Resp 16   Ht 5' 5\" (1.651 m)   Wt 145 lb (65.8 kg)   SpO2 100%   BMI 24.13 kg/m²     I/O last 3 completed shifts:  In: -   Out: 1340 [Urine:890; Stool:450]    Chest: Breath sounds were clear and equal with no rales, wheezes, or rhonchi. Respiratory effort was normal with no retractions or use of accessory muscles. Cardiovascular: Heart sounds were normal with a regular rate and rhythm. There were no murmurs or gallops. Abdomen: Bowel sounds were normal.  The abdomen was soft and non distended. There was no tenderness, guarding, rebound, or rigidity. There was no masses, hepatosplenomegaly, or hernias. Bilateral ischial wounds are clean with silver alginate dressing.   Wound extending to bone    LABS:  Lab Results   Component Value Date/Time    WBC 7.6 11/18/2022 05:33 AM    RBC 4.76 11/18/2022 05:33 AM    HGB 11.7 11/18/2022 05:33 AM    HCT 38.5 11/18/2022 05:33 AM    MCV 80.9 11/18/2022 05:33 AM    MCH 24.6 11/18/2022 05:33 AM    MCHC 30.4 11/18/2022 05:33 AM    RDW 17.4 11/18/2022 05:33 AM     11/18/2022 05:33 AM    MPV 9.4 11/18/2022 05:33 AM       Lab Results   Component Value Date/Time     11/18/2022 05:33 AM    K 3.9 11/18/2022 05:33 AM     11/18/2022 05:33 AM    CO2 26 11/18/2022 05:33 AM    BUN 10 11/19/2022 04:54 AM    CREATININE 0.58 11/19/2022 04:54 AM    GLUCOSE 101 11/18/2022 05:33 AM    CALCIUM 9.6 11/18/2022 05:33 AM       Assessment/Plan:   Bilateral ischial decubitus ulcers stage IV extending to bone with osteomyelitis noted by MRI. Continue dressing changes and patient will require low-air-loss alternating pressure mattress at next level of care indefinitely due to paraplegic condition. Adding vitamins to regimen and would monitor protein/nutritional parameters closely.       Electronically signed by Mary Fagan DO  on 11/19/2022 at 7:54 AM

## 2022-11-19 NOTE — DISCHARGE SUMMARY
Patient discharged via [facility]. Discharge paperwork and instructions given to patient/EMS. Patient acknowledges understanding. New medications and side effects explained. Follow up appointments reviewed (directions to make follow up appointment given)  Discharge checklist completed   CASIE completed and signed per writer and physician for [facility]    Any questions answered. Telemetry monitor returned     Discharge packet given to EMS to deliver to RN receiving patient.

## 2022-11-19 NOTE — PROGRESS NOTES
Pts colostomy bad became loose, replaced bag (circumference of 40). Pt also had PICC line placed and is infusing with no issues.
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
## 2022-11-19 NOTE — PLAN OF CARE
Problem: Discharge Planning  Goal: Discharge to home or other facility with appropriate resources  Outcome: Progressing  Discharge to home or other facility with appropriate resources:   Identify barriers to discharge with patient and caregiver   Arrange for needed discharge resources and transportation as appropriate   Identify discharge learning needs (meds, wound care, etc)   Arrange for interpreters to assist at discharge as needed   Refer to discharge planning if patient needs post-hospital services based on physician order or complex needs related to functional status, cognitive ability or social support system      Problem: Nutrition Deficit:  Goal: Optimize nutritional status  Nutrient intake appropriate for improving, restoring, or maintaining nutritional needs:   Assess nutritional status and recommend course of action   Monitor oral intake, labs, and treatment plans   Recommend appropriate diets, oral nutritional supplements, and vitamin/mineral supplements

## 2022-11-19 NOTE — PLAN OF CARE
Problem: Discharge Planning  Goal: Discharge to home or other facility with appropriate resources  11/19/2022 0850 by Abigail Miramontes RN  Outcome: Progressing  11/19/2022 0314 by Chelsey Manzanares RN  Outcome: Progressing  Flowsheets (Taken 11/19/2022 1707)  Discharge to home or other facility with appropriate resources:   Identify barriers to discharge with patient and caregiver   Arrange for needed discharge resources and transportation as appropriate   Identify discharge learning needs (meds, wound care, etc)   Arrange for interpreters to assist at discharge as needed   Refer to discharge planning if patient needs post-hospital services based on physician order or complex needs related to functional status, cognitive ability or social support system     Problem: Skin/Tissue Integrity  Goal: Absence of new skin breakdown  Description: 1. Monitor for areas of redness and/or skin breakdown  2. Assess vascular access sites hourly  3. Every 4-6 hours minimum:  Change oxygen saturation probe site  4. Every 4-6 hours:  If on nasal continuous positive airway pressure, respiratory therapy assess nares and determine need for appliance change or resting period.   11/19/2022 0850 by Abigail Miramontes RN  Outcome: Progressing  11/19/2022 0314 by Chelsey Manzanares RN  Outcome: Progressing     Problem: ABCDS Injury Assessment  Goal: Absence of physical injury  Outcome: Progressing  Flowsheets (Taken 11/19/2022 0836)  Absence of Physical Injury: Implement safety measures based on patient assessment     Problem: Pain  Goal: Verbalizes/displays adequate comfort level or baseline comfort level  Outcome: Progressing     Problem: Wound:  Goal: Will show signs of wound healing; wound closure and no evidence of infection  Description: Will show signs of wound healing; wound closure and no evidence of infection  Outcome: Progressing     Problem: Nutrition Deficit:  Goal: Optimize nutritional status  Outcome: Progressing Problem: Safety - Adult  Goal: Free from fall injury  Outcome: Progressing  Flowsheets (Taken 11/19/2022 9058)  Free From Fall Injury: Instruct family/caregiver on patient safety

## 2022-11-19 NOTE — PROGRESS NOTES
Physical Therapy  DATE: 2022    NAME: Rene Whatley  MRN: [id number]   : 2000    Patient not seen this date for Physical Therapy due to:      [] Cancel by RN or physician due to:    [] Hemodialysis    [] Critical Lab Value Level     [] Blood transfusion in progress    [] Acute or unstable cardiovascular status   _MAP < 55 or more than >115  _HR < 40 or > 130    [] Acute or unstable pulmonary status   -FiO2 > 60%   _RR < 5 or >40    _O2 sats < 85%    [] Strict Bedrest    [] Off Unit for surgery or procedure    [] Off Unit for testing       [] Pending imaging to R/O fracture    [x] Refusal by Patient Being d/c'd     [] Other      [] PT being discontinued at this time. Patient independent. No further needs. [] PT being discontinued at this time as the patient has been transferred to hospice care. No further needs.       Zan Brooks

## 2022-11-19 NOTE — CONSULTS
[facility] Pharmacokinetic Monitoring Service - Vancomycin     Carlos Titus is a 20 y.o. male starting on vancomycin therapy for bloodstream infection. Pharmacy consulted by   Dr. Edgar De La Fuente for monitoring and adjustment. Target Concentration: Goal AUC/JEREMY 400-600 mg*hr/L    Additional Antimicrobials: Merrem    Pertinent Laboratory Values: Wt Readings from Last 1 Encounters:   11/13/22 145 lb (65.8 kg)     Temp Readings from Last 1 Encounters:   11/18/22 97.5 °F (36.4 °C) (Oral)     Estimated Creatinine Clearance: 238 mL/min (A) (based on SCr of 0.42 mg/dL (L)). Recent Labs     11/18/22  0533   CREATININE 0.42*   WBC 7.6     Procalcitonin: 0.24 on 11/14/22    Pertinent Cultures:  Culture Date Source Results   11/13/22 Blood x 2 No growth x 4 days   MRSA Nasal Swab: N/A. Non-respiratory infection.     Plan:  Dosing recommendations based on Bayesian software  Start vancomycin 1500mg q12hr  Anticipated AUC of 548 and trough concentration of 14.9 at steady state  Renal labs as indicated   Vancomycin concentration ordered for 11/19/22 @ 1800   Pharmacy will continue to monitor patient and adjust therapy as indicated    Thank you for the consult,  Fabio Cuevas, [facility]  11/18/2022 11:42 PM

## 2022-11-19 NOTE — PROGRESS NOTES
Transport called sought to pickup pt earlier. Advised Dr. Marvin of pending Vanco due and whether to dx patient with further instructions.

## 2022-11-19 NOTE — CARE COORDINATION
Discharge planning    Faxed over matty to [facility]. Number for report given to nurse.

## 2022-11-19 NOTE — DISCHARGE INSTRUCTIONS
Vancolmycin IV will need to be given this afternoon upon arrival at floor. Pharmacy did not have dose in time for early administration nor timely discharge.

## 2022-11-19 NOTE — PROGRESS NOTES
Pt is resting in comfort. No concerns as of this morning. No pain noted. CASIE completed and awaiting discharge order. PS Dr. Marvin for order, as transport company is seeking to receive patient for transfer to [place] [address]. Javier. Vancomycin will need to be continued at [place] [address] facility due to no dose readily available (PS of pharmacy was made however timing is too long) and limited time for infusion.

## 2022-11-19 NOTE — PROGRESS NOTES
organisms in the past including MRSA and Enterobacter. He was last hospitalized in May 2021. The patient reports that he does not follow with wound care and has been having his friend helping with dressing changes at home and they typically do the dressing changes daily or at the worst every other day. The patient reports that while coming out of his shower the suprapubic catheter became dislodged and for this reason he came into the emergency department. He does not report any other subjective complaints and reports that he was actually doing well at his baseline. He does report episodes of diaphoresis but this has been going on and is likely neurogenic in origin. He did not report any subjective fevers or chills and the patient was found to have an abnormal urinalysis, low-grade temperature, and there was concern for a urinary tract infection prompting admission. The patient was also found to have bilateral ischial wounds and was stage IV the left and right ischium. An MRI of the pelvis was ordered that showed deep soft tissue defect/wounds extending to the bilateral ischial tuberosities with associated osteomyelitis and osseous destruction of the ischial tuberosities left greater than right with associated fluid in the deep wound defects at the margins of the ischial tuberosities left greater than right. Mild to moderate myositis of the bilateral abductor, gluteal and hamstring musculature. I was asked to evaluate and help with antibiotic choice. Current evaluation:2022    /88   Pulse 73   Temp 98.2 °F (36.8 °C)   Resp 16   Ht 5' 5\" (1.651 m)   Wt 145 lb (65.8 kg)   SpO2 100%   BMI 24.13 kg/m²     Temperature Range: Temp: 98.2 °F (36.8 °C) Temp  Av.3 °F (36.8 °C)  Min: 97.5 °F (36.4 °C)  Max: 99 °F (37.2 °C)  The patient is seen and evaluated at bedside he is awake and alert in no acute distress.   He was seen by the general surgery team yesterday and underwent removal of the fibrinous material on the right side with no purulence identified. The patient is otherwise doing well does not report any new complaints continues to have episodic episodes of diaphoresis. Review of Systems   Constitutional:  Positive for diaphoresis. HENT: Negative. Respiratory: Negative. Cardiovascular: Negative. Gastrointestinal: Negative. Genitourinary: Negative. Musculoskeletal: Negative. Skin:  Positive for wound. Neurological: Negative. Psychiatric/Behavioral: Negative. Physical Examination :     Physical Exam  Constitutional:       Appearance: He is well-developed. He is cachectic. HENT:      Head: Normocephalic and atraumatic. Eyes:      Comments: The right eye is damaged/closed   Cardiovascular:      Rate and Rhythm: Normal rate. Heart sounds: Normal heart sounds. No friction rub. No gallop. Pulmonary:      Effort: Pulmonary effort is normal.      Breath sounds: Normal breath sounds. No wheezing. Abdominal:      General: Bowel sounds are normal.      Palpations: Abdomen is soft. There is no mass. Tenderness: There is no abdominal tenderness. Musculoskeletal:         General: Normal range of motion. Cervical back: Neck supple. Lymphadenopathy:      Cervical: No cervical adenopathy. Skin:     General: Skin is warm and dry. Comments: The dressings were not removed today   Neurological:      Mental Status: He is alert and oriented to person, place, and time.        Laboratory data:   I have independently reviewed the followinglabs:  CBC with Differential:   Recent Labs     11/18/22  0533   WBC 7.6   HGB 11.7*   HCT 38.5*   *   LYMPHOPCT 34   MONOPCT 5       BMP:   Recent Labs     11/18/22  0533 11/19/22  0454     --    K 3.9  --      --    CO2 26  --    BUN 6 10   CREATININE 0.42* 0.58*       Hepatic Function Panel: No results for input(s): PROT, LABALBU, BILIDIR, IBILI, BILITOT, ALKPHOS, ALT, AST in the last 72 hours. Lab Results   Component Value Date/Time    PROCAL 0.24 11/14/2022 05:13 AM     Lab Results   Component Value Date/Time    CRP 51.4 11/17/2022 02:50 PM    .8 05/04/2021 12:30 PM     Lab Results   Component Value Date    SEDRATE 115 (H) 11/17/2022         No results found for: DDIMER  No results found for: FERRITIN  No results found for: LDH  No results found for: FIBRINOGEN    No results found for requested labs within last 30 days. Lab Results   Component Value Date/Time    COVID19 Not Detected 05/02/2021 11:22 PM    COVID19 NO SAMPLE RECEIVED 02/08/2021 08:45 AM    COVID19 PENDING 02/08/2021 08:45 AM    COVID19 NO SAMPLE RECEIVED 02/08/2021 08:45 AM    COVID19 Not Detected 12/14/2020 06:47 PM       No results for input(s): VANCOTROUGH in the last 72 hours. Imaging Studies:   No new imaging    Cultures:     Culture, Blood 1 [[id number]] Collected: 11/13/22 2155   Order Status: Completed Specimen: Blood Updated: 11/18/22 0834    Specimen Description . BLOOD    Special Requests LT AC, 10 ML    Culture NO GROWTH 4 DAYS   Culture, Blood 2 [[id number]] Collected: 11/13/22 2236   Order Status: Completed Specimen: Blood Updated: 11/18/22 0834    Specimen Description . BLOOD    Special Requests RT AC, 7 ML    Culture NO GROWTH 4 DAYS     Culture, Urine [[id number]] (Abnormal)  Collected: 11/13/22 2145   Order Status: Completed Specimen: Urine, suprapubic catheter Updated: 11/16/22 0030    Specimen Description . SUPRAPUBIC CATH URINE    Culture ESCHERICHIA COLI >[id number] CFU/ML Abnormal      Escherichia coli (1)    Antibiotic Interpretation Microscan Method Status    ampicillin Resistant >=32 BACTERIAL SUSCEPTIBILITY PANEL JEREMY Final    aztreonam Sensitive <=1 BACTERIAL SUSCEPTIBILITY PANEL JEREMY Final    ceFAZolin Sensitive <=4 BACTERIAL SUSCEPTIBILITY PANEL JEREMY Final     Cefazolin sensitivity results can be used to predict the effectiveness of oral   cephalosporins (eg.  Cephalexin) in uncomplicated Urinary Tract Infections due to E. coli, K.    pneumoniae, and P. mirabilis        cefTRIAXone Sensitive <=1 BACTERIAL SUSCEPTIBILITY PANEL JEREMY Final    ciprofloxacin Resistant >=4 BACTERIAL SUSCEPTIBILITY PANEL JEREMY Final    Confirmatory Extended Spectrum Beta-Lactamase Negative NEGATIVE BACTERIAL SUSCEPTIBILITY PANEL JEREMY Final    gentamicin Sensitive <=1 BACTERIAL SUSCEPTIBILITY PANEL JEREMY Final    nitrofurantoin Sensitive <=16 BACTERIAL SUSCEPTIBILITY PANEL JEREMY Final    tobramycin Sensitive <=1 BACTERIAL SUSCEPTIBILITY PANEL JEREMY Final    trimethoprim-sulfamethoxazole Resistant >=320 BACTERIAL SUSCEPTIBILITY PANEL JEREMY Final    piperacillin-tazobactam Sensitive <=4 BACTERIAL SUSCEPTIBILITY PANEL JEREMY Final          Specimen Collected: 11/13/22 21:45 EST Last Resulted: 11/16/22 00:30 EST          Medications:      meropenem  1,000 mg IntraVENous Q8H    vancomycin (VANCOCIN) intermittent dosing (placeholder)   Other RX Placeholder    vancomycin  1,500 mg IntraVENous Q12H    sodium chloride flush  10 mL IntraVENous Once    sodium chloride flush  5-40 mL IntraVENous 2 times per day    lactobacillus  1 tablet Oral TID WC    famotidine  20 mg Oral Daily    enoxaparin  40 mg SubCUTAneous Daily    gabapentin  400 mg Oral TID    sodium chloride flush  5-40 mL IntraVENous 2 times per day           [facility]  Isak Wilson MD  Perfect Serve messaging  OFFICE: [phone]      Electronically signed by Isak Wilson MD on 11/19/2022 at 7:21 AM  Thank you for allowing us to participate in the care of this patient. Please call with questions. This note iscreated with the assistance of a speech recognition program.  While intending to generate a document that actually reflects the content of the visit, the document can still have some errors including those of syntax andsound a like substitutions which may escape proof reading.   In such instances, actual meaning can be extrapolated by contextual diversion.

## 2022-11-22 LAB — VANCOMYCIN TROUGH: 13.9 UG/ML (ref 10–20)

## 2022-11-22 PROCEDURE — 80202 ASSAY OF VANCOMYCIN: CPT

## 2022-11-28 PROCEDURE — 80202 ASSAY OF VANCOMYCIN: CPT

## 2022-11-29 PROCEDURE — 80048 BASIC METABOLIC PNL TOTAL CA: CPT

## 2024-10-23 NOTE — PROGRESS NOTES
anemia   Hgb stable yesterday. No CBC today    Pain management   Continue Tylenol, ibuprofen, robaxin/ neurontin   Luly PRN   Fentanyl PRN   D/c fentanyl gtt   Continue Precedex gtt    DVT proph   Lovenox BID    GI   No evidence of injury from EGD   On Glycolax and senokot   POD#4 PEG    Renal   straight cath q4hrs         CHECKLIST    CAM-ICU RASS: 0  RESTRAINTS: none  IVF: no  NUTRITION: tube feed  ANTIBIOTICS: unasyn  GI: diet  DVT: lovenox  GLYCEMIC CONTROL: n/a  HOB >45: yes  MOBILITY: no  SBT: yes  IS: no    Chief Complaint: \"n/a\"    SUBJECTIVE    Balbir Foley was seen and examined at bedside. No events overnight. OBJECTIVE  VITALS: Temp: Temp: 98.8 °F (37.1 °C)Temp  Av.7 °F (37.1 °C)  Min: 98.2 °F (36.8 °C)  Max: 99.3 °F (33.5 °C) BP Systolic (00AZO), QZP:054 , Min:105 , BZX:977   Diastolic (71PIA), UGF:39, Min:50, Max:97   Pulse Pulse  Av.2  Min: 53  Max: 86 Resp Resp  Av.4  Min: 12  Max: 32 Pulse ox SpO2  Av %  Min: 85 %  Max: 100 %    CONSTITUTIONAL: on mechanical vent. HEENT: left pupil reactive and round  LUNGS: Rhonchi throughout  CV: regular rate and rhythm  GI: abdomen soft. Bowel sounds active  MUSCULOSKELETAL: no deformity  NEUROLOGIC: follows commands. Shakes head yes and no to questions. Unable to feel below nipple line. SKIN: warm, dry      Drain/tube output:    Neck drain-215ml/24 hours  Lumbar drain-12 ml/24 hours  Serrano-1690 ml/24 hours    LAB:  CBC:   Recent Labs     20  1256 20  0518 20  0535   WBC  --  4.4* 4.8   HGB 8.3* 8.1* 8.0*   HCT 25.5* 24.7* 24.4*   MCV  --  92.5 91.7   PLT  --  103* See Reflexed IPF Result     BMP:   Recent Labs     20  0518 20  0535 20  0406   * 149* 144   K 3.4* 3.2* 3.6*   * 114* 108*   CO2 23 26 26   BUN 7 8 11   CREATININE 0.55* 0.63* 0.55*   GLUCOSE 119* 121* 105*         RADIOLOGY:  CXR :  Impression    No significant change from prior study.  No definite acute process is seen. Melissa Jacques DO  Emergency Medicine PGY2  Trauma Service     Attending Note      I have reviewed the above [facility] note(s) and I either performed the key elements of the medical history and physical exam or was present with the critical care resident when the key elements of the medical history and physical exam were performed. I have discussed the findings, established the care plan and recommendations with the critical care team.  Seen on rounds. Labs reviewed and stable. CXR stable. On C-pap at present and maintaining sats. Tolerasting PEG feeds. Minimal pressor need. Will contact opth regarding timing of enucleation. Unable to use hands bilat. Dispo planning. Discussed plan with patient. Critical care time 30 min.     Lito Dale MD  12/7/2020  10:27 AM no

## 2025-03-20 ENCOUNTER — HOSPITAL ENCOUNTER (EMERGENCY)
Age: 25
Discharge: HOME | End: 2025-03-20
Attending: EMERGENCY MEDICINE
Payer: MEDICAID

## 2025-03-20 VITALS
TEMPERATURE: 98.1 F | HEART RATE: 68 BPM | SYSTOLIC BLOOD PRESSURE: 111 MMHG | BODY MASS INDEX: 18.33 KG/M2 | RESPIRATION RATE: 16 BRPM | HEIGHT: 65 IN | OXYGEN SATURATION: 97 % | WEIGHT: 110 LBS | DIASTOLIC BLOOD PRESSURE: 72 MMHG

## 2025-03-20 DIAGNOSIS — Z43.5 ENCOUNTER FOR SUPRAPUBIC CATHETER CARE (HCC): Primary | ICD-10-CM

## 2025-03-20 PROCEDURE — 99283 EMERGENCY DEPT VISIT LOW MDM: CPT

## 2025-03-20 ASSESSMENT — ENCOUNTER SYMPTOMS
ABDOMINAL PAIN: 0
COUGH: 0
SHORTNESS OF BREATH: 0

## 2025-03-20 ASSESSMENT — LIFESTYLE VARIABLES
HOW OFTEN DO YOU HAVE A DRINK CONTAINING ALCOHOL: NEVER
HOW MANY STANDARD DRINKS CONTAINING ALCOHOL DO YOU HAVE ON A TYPICAL DAY: PATIENT DOES NOT DRINK

## 2025-03-21 NOTE — ED NOTES
Report given to Carey from [facility] by phone.   Detail Level: Detailed Render Note In Bullet Format When Appropriate: No 52 Post-Care Instructions: I reviewed with the patient in detail post-care instructions. Patient is to wear sunprotection, and avoid picking at any of the treated lesions. Pt may apply Vaseline to crusted or scabbing areas. Duration Of Freeze Thaw-Cycle (Seconds): 2 Consent: The patient's consent was obtained including but not limited to risks of crusting, scabbing, blistering, scarring, darker or lighter pigmentary change, recurrence, incomplete removal and infection.

## 2025-03-21 NOTE — DISCHARGE INSTRUCTIONS
You were seen in the ER today for concerns regarding your suprapubic catheter.  Your catheter is draining spontaneously.  Please call your urologist and follow-up with them as soon as possible.     PLEASE RETURN TO THE ED IMMEDIATELY for worsening symptoms, or if you develop any concerning symptoms such as: high fever not relieved by tylenol and/or motrin, chills, shortness of breath, chest pain, persistent nausea and/or vomiting, numbness, weakness or tingling in the arms or legs or change in color of the extremities, changes in mental status, persistent headache, blurry vision, inability to urinate, unable to follow up with your physician, or other any other  Care or concern.
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
## 2025-08-06 ENCOUNTER — HOSPITAL ENCOUNTER (OUTPATIENT)
Dept: ICU | Age: 25
Discharge: HOME | End: 2025-08-06
Attending: INTERNAL MEDICINE | Admitting: INTERNAL MEDICINE

## 2025-08-07 LAB
ALBUMIN SERPL-MCNC: 3 G/DL (ref 3.5–5.2)
ALBUMIN/GLOB SERPL: 0.7 {RATIO} (ref 1–2.5)
ALP SERPL-CCNC: 132 U/L (ref 40–129)
ALT SERPL-CCNC: 28 U/L (ref 10–50)
ANION GAP SERPL CALCULATED.3IONS-SCNC: 11 MMOL/L (ref 9–16)
AST SERPL-CCNC: 18 U/L (ref 10–50)
BILIRUB SERPL-MCNC: <0.2 MG/DL (ref 0–1.2)
BUN SERPL-MCNC: 10 MG/DL (ref 6–20)
CALCIUM SERPL-MCNC: 8.9 MG/DL (ref 8.6–10.4)
CHLORIDE SERPL-SCNC: 103 MMOL/L (ref 98–107)
CO2 SERPL-SCNC: 26 MMOL/L (ref 20–31)
CREAT SERPL-MCNC: 0.4 MG/DL (ref 0.7–1.2)
ERYTHROCYTE [DISTWIDTH] IN BLOOD BY AUTOMATED COUNT: 14.3 % (ref 11.8–14.4)
GFR, ESTIMATED: >90 ML/MIN/1.73M2
GLUCOSE SERPL-MCNC: 86 MG/DL (ref 74–99)
HCT VFR BLD AUTO: 31.3 % (ref 40.7–50.3)
HGB BLD-MCNC: 10 G/DL (ref 13–17)
MAGNESIUM SERPL-MCNC: 1.9 MG/DL (ref 1.6–2.6)
MCH RBC QN AUTO: 26.7 PG (ref 25.2–33.5)
MCHC RBC AUTO-ENTMCNC: 31.9 G/DL (ref 28.4–34.8)
MCV RBC AUTO: 83.5 FL (ref 82.6–102.9)
NRBC BLD-RTO: 0 PER 100 WBC
PHOSPHATE SERPL-MCNC: 3.1 MG/DL (ref 2.5–4.5)
PLATELET # BLD AUTO: 405 K/UL (ref 138–453)
PMV BLD AUTO: 9 FL (ref 8.1–13.5)
POTASSIUM SERPL-SCNC: 3.7 MMOL/L (ref 3.7–5.3)
PROT SERPL-MCNC: 7.5 G/DL (ref 6.6–8.7)
RBC # BLD AUTO: 3.75 M/UL (ref 4.21–5.77)
SODIUM SERPL-SCNC: 140 MMOL/L (ref 136–145)
WBC OTHER # BLD: 5.5 K/UL (ref 3.5–11.3)

## 2025-08-07 PROCEDURE — 80053 COMPREHEN METABOLIC PANEL: CPT

## 2025-08-07 PROCEDURE — 80202 ASSAY OF VANCOMYCIN: CPT

## 2025-08-07 PROCEDURE — 84100 ASSAY OF PHOSPHORUS: CPT

## 2025-08-07 PROCEDURE — 83735 ASSAY OF MAGNESIUM: CPT

## 2025-08-07 PROCEDURE — 85027 COMPLETE CBC AUTOMATED: CPT

## 2025-08-08 LAB — VANCOMYCIN TROUGH SERPL-MCNC: 9.2 UG/ML (ref 10–20)

## 2025-08-09 LAB — VANCOMYCIN TROUGH SERPL-MCNC: 9.7 UG/ML (ref 10–20)

## 2025-08-09 PROCEDURE — 80202 ASSAY OF VANCOMYCIN: CPT

## 2025-08-11 LAB
ALBUMIN SERPL-MCNC: 2.9 G/DL (ref 3.5–5.2)
ALBUMIN/GLOB SERPL: 0.7 {RATIO} (ref 1–2.5)
ALP SERPL-CCNC: 135 U/L (ref 40–129)
ALT SERPL-CCNC: 17 U/L (ref 10–50)
ANION GAP SERPL CALCULATED.3IONS-SCNC: 10 MMOL/L (ref 9–16)
AST SERPL-CCNC: 13 U/L (ref 10–50)
BILIRUB SERPL-MCNC: <0.2 MG/DL (ref 0–1.2)
BUN SERPL-MCNC: 7 MG/DL (ref 6–20)
CALCIUM SERPL-MCNC: 8.8 MG/DL (ref 8.6–10.4)
CHLORIDE SERPL-SCNC: 101 MMOL/L (ref 98–107)
CO2 SERPL-SCNC: 26 MMOL/L (ref 20–31)
CREAT SERPL-MCNC: 0.4 MG/DL (ref 0.7–1.2)
ERYTHROCYTE [DISTWIDTH] IN BLOOD BY AUTOMATED COUNT: 15.6 % (ref 11.8–14.4)
GFR, ESTIMATED: >90 ML/MIN/1.73M2
GLUCOSE SERPL-MCNC: 88 MG/DL (ref 74–99)
HCT VFR BLD AUTO: 28.6 % (ref 40.7–50.3)
HGB BLD-MCNC: 9.1 G/DL (ref 13–17)
MAGNESIUM SERPL-MCNC: 1.6 MG/DL (ref 1.6–2.6)
MCH RBC QN AUTO: 26.5 PG (ref 25.2–33.5)
MCHC RBC AUTO-ENTMCNC: 31.8 G/DL (ref 28.4–34.8)
MCV RBC AUTO: 83.1 FL (ref 82.6–102.9)
NRBC BLD-RTO: 0 PER 100 WBC
PHOSPHATE SERPL-MCNC: 3.1 MG/DL (ref 2.5–4.5)
PLATELET # BLD AUTO: 313 K/UL (ref 138–453)
PMV BLD AUTO: 9.5 FL (ref 8.1–13.5)
POTASSIUM SERPL-SCNC: 3.6 MMOL/L (ref 3.7–5.3)
PROT SERPL-MCNC: 7 G/DL (ref 6.6–8.7)
RBC # BLD AUTO: 3.44 M/UL (ref 4.21–5.77)
SODIUM SERPL-SCNC: 137 MMOL/L (ref 136–145)
VANCOMYCIN TROUGH SERPL-MCNC: 24.2 UG/ML (ref 10–20)
WBC OTHER # BLD: 6.5 K/UL (ref 3.5–11.3)

## 2025-08-12 LAB
MAGNESIUM SERPL-MCNC: 1.9 MG/DL (ref 1.6–2.6)
POTASSIUM SERPL-SCNC: 4.1 MMOL/L (ref 3.7–5.3)

## 2025-08-13 LAB — VANCOMYCIN TROUGH SERPL-MCNC: 13.7 UG/ML (ref 10–20)

## 2025-08-14 LAB
BUN SERPL-MCNC: 9 MG/DL (ref 6–20)
CREAT SERPL-MCNC: 0.5 MG/DL (ref 0.7–1.2)
GFR, ESTIMATED: >90 ML/MIN/1.73M2

## 2025-08-15 LAB — VANCOMYCIN TROUGH SERPL-MCNC: 13.1 UG/ML (ref 10–20)

## 2025-08-18 LAB
ALBUMIN SERPL-MCNC: 3.3 G/DL (ref 3.5–5.2)
ALBUMIN/GLOB SERPL: 0.8 {RATIO} (ref 1–2.5)
ALP SERPL-CCNC: 150 U/L (ref 40–129)
ALT SERPL-CCNC: 15 U/L (ref 10–50)
ANION GAP SERPL CALCULATED.3IONS-SCNC: 11 MMOL/L (ref 9–16)
AST SERPL-CCNC: 17 U/L (ref 10–50)
BILIRUB SERPL-MCNC: <0.2 MG/DL (ref 0–1.2)
BUN SERPL-MCNC: 6 MG/DL (ref 6–20)
CALCIUM SERPL-MCNC: 9 MG/DL (ref 8.6–10.4)
CHLORIDE SERPL-SCNC: 102 MMOL/L (ref 98–107)
CO2 SERPL-SCNC: 27 MMOL/L (ref 20–31)
CREAT SERPL-MCNC: 0.5 MG/DL (ref 0.7–1.2)
ERYTHROCYTE [DISTWIDTH] IN BLOOD BY AUTOMATED COUNT: 15.8 % (ref 11.8–14.4)
GFR, ESTIMATED: >90 ML/MIN/1.73M2
GLUCOSE SERPL-MCNC: 82 MG/DL (ref 74–99)
HCT VFR BLD AUTO: 31.7 % (ref 40.7–50.3)
HGB BLD-MCNC: 10.2 G/DL (ref 13–17)
MAGNESIUM SERPL-MCNC: 1.8 MG/DL (ref 1.6–2.6)
MCH RBC QN AUTO: 26.3 PG (ref 25.2–33.5)
MCHC RBC AUTO-ENTMCNC: 32.2 G/DL (ref 28.4–34.8)
MCV RBC AUTO: 81.7 FL (ref 82.6–102.9)
NRBC BLD-RTO: 0 PER 100 WBC
PHOSPHATE SERPL-MCNC: 3.6 MG/DL (ref 2.5–4.5)
PLATELET # BLD AUTO: 364 K/UL (ref 138–453)
PMV BLD AUTO: 9.7 FL (ref 8.1–13.5)
POTASSIUM SERPL-SCNC: 3.7 MMOL/L (ref 3.7–5.3)
PROCALCITONIN SERPL-MCNC: <0.02 NG/ML (ref 0–0.09)
PROT SERPL-MCNC: 7.6 G/DL (ref 6.6–8.7)
RBC # BLD AUTO: 3.88 M/UL (ref 4.21–5.77)
SODIUM SERPL-SCNC: 140 MMOL/L (ref 136–145)
VANCOMYCIN TROUGH SERPL-MCNC: 11.8 UG/ML (ref 10–20)
WBC OTHER # BLD: 6.6 K/UL (ref 3.5–11.3)

## 2025-08-21 LAB
CREAT SERPL-MCNC: 0.4 MG/DL (ref 0.7–1.2)
GFR, ESTIMATED: >90 ML/MIN/1.73M2
MAGNESIUM SERPL-MCNC: 1.7 MG/DL (ref 1.6–2.6)
VANCOMYCIN TROUGH SERPL-MCNC: 28 UG/ML (ref 10–20)

## 2025-08-22 LAB — MAGNESIUM SERPL-MCNC: 1.7 MG/DL (ref 1.6–2.6)

## 2025-08-23 LAB — MAGNESIUM SERPL-MCNC: 1.7 MG/DL (ref 1.6–2.6)

## 2025-08-24 LAB
ALBUMIN SERPL-MCNC: 3.3 G/DL (ref 3.5–5.2)
ALBUMIN/GLOB SERPL: 0.8 {RATIO} (ref 1–2.5)
ALP SERPL-CCNC: 150 U/L (ref 40–129)
ALT SERPL-CCNC: 12 U/L (ref 10–50)
ANION GAP SERPL CALCULATED.3IONS-SCNC: 12 MMOL/L (ref 9–16)
AST SERPL-CCNC: 15 U/L (ref 10–50)
BILIRUB SERPL-MCNC: <0.2 MG/DL (ref 0–1.2)
BUN SERPL-MCNC: 4 MG/DL (ref 6–20)
CALCIUM SERPL-MCNC: 9 MG/DL (ref 8.6–10.4)
CHLORIDE SERPL-SCNC: 105 MMOL/L (ref 98–107)
CO2 SERPL-SCNC: 24 MMOL/L (ref 20–31)
CREAT SERPL-MCNC: 0.5 MG/DL (ref 0.7–1.2)
ERYTHROCYTE [DISTWIDTH] IN BLOOD BY AUTOMATED COUNT: 16.2 % (ref 11.8–14.4)
GFR, ESTIMATED: >90 ML/MIN/1.73M2
GLUCOSE SERPL-MCNC: 80 MG/DL (ref 74–99)
HCT VFR BLD AUTO: 31.8 % (ref 40.7–50.3)
HGB BLD-MCNC: 10.2 G/DL (ref 13–17)
MAGNESIUM SERPL-MCNC: 1.9 MG/DL (ref 1.6–2.6)
MCH RBC QN AUTO: 26.2 PG (ref 25.2–33.5)
MCHC RBC AUTO-ENTMCNC: 32.1 G/DL (ref 28.4–34.8)
MCV RBC AUTO: 81.7 FL (ref 82.6–102.9)
NRBC BLD-RTO: 0 PER 100 WBC
PHOSPHATE SERPL-MCNC: 3.3 MG/DL (ref 2.5–4.5)
PLATELET # BLD AUTO: 349 K/UL (ref 138–453)
PMV BLD AUTO: 9.6 FL (ref 8.1–13.5)
POTASSIUM SERPL-SCNC: 3.9 MMOL/L (ref 3.7–5.3)
PROT SERPL-MCNC: 7.4 G/DL (ref 6.6–8.7)
RBC # BLD AUTO: 3.89 M/UL (ref 4.21–5.77)
SODIUM SERPL-SCNC: 142 MMOL/L (ref 136–145)
VANCOMYCIN TROUGH SERPL-MCNC: 29.7 UG/ML (ref 10–20)
VANCOMYCIN TROUGH SERPL-MCNC: NORMAL UG/ML
VANCOMYCIN TROUGH SERPL-MCNC: NORMAL UG/ML
WBC OTHER # BLD: 6.5 K/UL (ref 3.5–11.3)

## 2025-08-25 LAB — VANCOMYCIN SERPL-MCNC: <4 UG/ML (ref 5–40)

## 2025-08-27 LAB
CREAT SERPL-MCNC: 0.5 MG/DL (ref 0.7–1.2)
GFR, ESTIMATED: >90 ML/MIN/1.73M2
VANCOMYCIN TROUGH SERPL-MCNC: 18.1 UG/ML (ref 10–20)

## 2025-09-02 LAB
ALBUMIN SERPL-MCNC: 3.6 G/DL (ref 3.5–5.2)
ALBUMIN/GLOB SERPL: 0.9 {RATIO} (ref 1–2.5)
ALP SERPL-CCNC: 154 U/L (ref 40–129)
ALT SERPL-CCNC: 12 U/L (ref 10–50)
ANION GAP SERPL CALCULATED.3IONS-SCNC: 10 MMOL/L (ref 9–16)
AST SERPL-CCNC: 18 U/L (ref 10–50)
BILIRUB SERPL-MCNC: <0.2 MG/DL (ref 0–1.2)
BUN SERPL-MCNC: 4 MG/DL (ref 6–20)
CALCIUM SERPL-MCNC: 9.1 MG/DL (ref 8.6–10.4)
CHLORIDE SERPL-SCNC: 102 MMOL/L (ref 98–107)
CO2 SERPL-SCNC: 28 MMOL/L (ref 20–31)
CREAT SERPL-MCNC: 0.4 MG/DL (ref 0.7–1.2)
ERYTHROCYTE [DISTWIDTH] IN BLOOD BY AUTOMATED COUNT: 16.3 % (ref 11.8–14.4)
GFR, ESTIMATED: >90 ML/MIN/1.73M2
GLUCOSE SERPL-MCNC: 84 MG/DL (ref 74–99)
HCT VFR BLD AUTO: 32.6 % (ref 40.7–50.3)
HGB BLD-MCNC: 10.6 G/DL (ref 13–17)
MAGNESIUM SERPL-MCNC: 1.9 MG/DL (ref 1.6–2.6)
MCH RBC QN AUTO: 26 PG (ref 25.2–33.5)
MCHC RBC AUTO-ENTMCNC: 32.5 G/DL (ref 28.4–34.8)
MCV RBC AUTO: 80.1 FL (ref 82.6–102.9)
NRBC BLD-RTO: 0 PER 100 WBC
PHOSPHATE SERPL-MCNC: 3 MG/DL (ref 2.5–4.5)
PLATELET # BLD AUTO: 252 K/UL (ref 138–453)
PMV BLD AUTO: 9.9 FL (ref 8.1–13.5)
POTASSIUM SERPL-SCNC: 4.1 MMOL/L (ref 3.7–5.3)
PROT SERPL-MCNC: 7.3 G/DL (ref 6.6–8.7)
RBC # BLD AUTO: 4.07 M/UL (ref 4.21–5.77)
SODIUM SERPL-SCNC: 140 MMOL/L (ref 136–145)
VANCOMYCIN TROUGH SERPL-MCNC: 12.9 UG/ML (ref 10–20)
WBC OTHER # BLD: 4.3 K/UL (ref 3.5–11.3)

## (undated) DEVICE — GAUZE,PACKING STRIP,PLAIN,1/2"X5YD,STRL: Brand: CURAD

## (undated) DEVICE — BATTERY PACK 2 FOR QUIKDRIVE: Brand: UNIVERSAL NEURO 2, QUIKDRIVE

## (undated) DEVICE — GOWN,AURORA,NONREINFORCED,LARGE: Brand: MEDLINE

## (undated) DEVICE — GLOVE,EXAM,NITRILE,RESTORE,OAT SENSE,L: Brand: MEDLINE

## (undated) DEVICE — GAUZE,SPONGE,FLUFF,6"X6.75",STRL,5/TRAY: Brand: MEDLINE

## (undated) DEVICE — Z DISCONTINUED BY MEDLINE USE 2280062 TUBE TRACH SZ 8 L79MM OD12.2MM ID7.6MM CUF DISP INNR CANN

## (undated) DEVICE — DRAPE,REIN 53X77,STERILE: Brand: MEDLINE

## (undated) DEVICE — SCISSORS SURG L4.75IN S STL MIR FINISH WIRE CUT ANG TO SIDE

## (undated) DEVICE — KIT PEG 20FR STD PUL EN ACCS DEV ENDOVIVE

## (undated) DEVICE — SUTURE ETHLN SZ 3-0 L18IN NONABSORBABLE BLK FS-1 L24MM 3/8 663H

## (undated) DEVICE — SPONGE DRN W4XL4IN RAYON/POLYESTER 6 PLY NONWOVEN PRECUT

## (undated) DEVICE — NEEDLE SPNL 18GA L3.5IN W/ QNCKE SHARPER BVL DURA CLICK

## (undated) DEVICE — GLOVE SURG SZ 65 THK91MIL LTX FREE SYN POLYISOPRENE

## (undated) DEVICE — NEEDLE HYPO 25GA L1.5IN BLU POLYPR HUB S STL REG BVL STR

## (undated) DEVICE — BLADE ES L4IN INSUL EDGE

## (undated) DEVICE — MERCY HEALTH ST CHARLES: Brand: MEDLINE INDUSTRIES, INC.

## (undated) DEVICE — LOOP VES W25MM THK1MM MAXI RED SIL FLD REPELLENT 100 PER

## (undated) DEVICE — SUTURE STL SZ 0 L18IN NONABSORBABLE UD TIE PRE-CUT MFIL DS26

## (undated) DEVICE — MIC* SAFETY PERCUTANEOUS ENDOSCOPIC GASTROSTOMY PEG KIT - 20 FR - PULL: Brand: MIC PEG TUBE

## (undated) DEVICE — 3.0MM PRECISION NEURO (MATCH HEAD)

## (undated) DEVICE — SPONGE LAP W18XL18IN WHT COT 4 PLY FLD STRUNG RADPQ DISP ST

## (undated) DEVICE — DRAPE MICSCP W117XL305CM DIA65MM LENS W VARI LENS2 FOR LEICA

## (undated) DEVICE — GLOVE ORTHO 7   MSG9470

## (undated) DEVICE — SPONGE,PEANUT,XRAY,ST,SM,3/8",5/CARD: Brand: MEDLINE INDUSTRIES, INC.

## (undated) DEVICE — SUTURE VCRL + SZ 3-0 L18IN ABSRB VLT SH-1 L22MM 1/2 CIR VCP772D

## (undated) DEVICE — SOLUTION PREP POVIDONE IOD FOR SKIN MUCOUS MEM PRIOR TO

## (undated) DEVICE — 3M™ STERI-STRIP™ BLEND TONE SKIN CLOSURES, B1557, TAN, 1/2 IN X 4 IN (12MM X 100MM), 6 STRIPS/ENVELOPE: Brand: 3M™ STERI-STRIP™

## (undated) DEVICE — SPONGE GZ W3XL3IN 4 PLY RAYON POLY STD NONWOVEN

## (undated) DEVICE — BLADE ES ELASTOMERIC COAT INSUL DURABLE BEND UPTO 90DEG

## (undated) DEVICE — ELECTRODE ELECSURG NDL 2.8 INX7.2 CM COAT INSUL EDGE

## (undated) DEVICE — SUTURE PROL SZ 3-0 L30IN NONABSORBABLE BLU L26MM SH 1/2 CIR 8832H

## (undated) DEVICE — ABS MED DISTRACTION PIN , 12MM POUCH
Type: IMPLANTABLE DEVICE | Site: SPINE CERVICAL | Status: NON-FUNCTIONAL
Brand: ABS MED DISTRACTION PIN
Removed: 2020-12-01

## (undated) DEVICE — Device

## (undated) DEVICE — COLLAR CERV UNIV AD L13-19IN TRACH OPN TWO PC RIG POLYETH

## (undated) DEVICE — GARMENT,MEDLINE,DVT,INT,CALF,MED, GEN2: Brand: MEDLINE

## (undated) DEVICE — COVER,MAYO STAND,STERILE: Brand: MEDLINE

## (undated) DEVICE — 3M™ IOBAN™ 2 ANTIMICROBIAL INCISE DRAPE 6650EZ: Brand: IOBAN™ 2

## (undated) DEVICE — PATIENT RETURN ELECTRODE, SINGLE-USE, CONTACT QUALITY MONITORING, ADULT, WITH 9FT CORD, FOR PATIENTS WEIGING OVER 33LBS. (15KG): Brand: MEGADYNE

## (undated) DEVICE — GAUZE,PACKING STRIP,PLAIN,1"X5YD,STRL,LF: Brand: CURAD

## (undated) DEVICE — BLANKET WRM W29.9XL79.1IN UP BODY FORC AIR MISTRAL-AIR

## (undated) DEVICE — INTENDED FOR TISSUE SEPARATION, AND OTHER PROCEDURES THAT REQUIRE A SHARP SURGICAL BLADE TO PUNCTURE OR CUT.: Brand: BARD-PARKER ® CARBON RIB-BACK BLADES

## (undated) DEVICE — MARKER,SKIN,WI/RULER AND LABELS: Brand: MEDLINE

## (undated) DEVICE — GLOVE ORTHO 7 1/2   MSG9475

## (undated) DEVICE — PAD,NON-ADHERENT,3X8,STERILE,LF,1/PK: Brand: MEDLINE

## (undated) DEVICE — Device: Brand: SENSURA MIO

## (undated) DEVICE — DRESSING TRNSPAR W2XL2.75IN FLM SHT SEMIPERMEABLE WIND

## (undated) DEVICE — PAD,ABDOMINAL,8"X7.5",ST,LF,20/BX: Brand: MEDLINE INDUSTRIES, INC.

## (undated) DEVICE — ACCUDRAIN® EXTERNAL CSF DRAINAGE SYSTEM WITH ANTI-REFLUX VALVE: Brand: ACCUDRAIN®

## (undated) DEVICE — TUBE LUKENS 20CC 6 1/4": Brand: ALLEGIANCE

## (undated) DEVICE — ELECTRODE PT RET AD L9FT HI MOIST COND ADH HYDRGEL CORDED

## (undated) DEVICE — CATHETER 46419 EDM LUMBAR 80CM W/TIP

## (undated) DEVICE — SVMMC HD AND NK PK

## (undated) DEVICE — ROD OST L65MM LOOP FLNG FOR SURFIT NATURA AUTOLOK GENTLE

## (undated) DEVICE — AGENT HEMOSTATIC SURGIFLOW MATRIX KIT W/THROMBIN

## (undated) DEVICE — SOLUTION IV IRRIG POUR BRL 0.9% SODIUM CHL 2F7124

## (undated) DEVICE — GAUZE,PACKING STRIP,PLAIN,2"X5YD,STRL,LF: Brand: CURAD

## (undated) DEVICE — KIT DRN FLAT W/ 100CC EVAC 7MM FULL PERF

## (undated) DEVICE — MEDI-VAC YANKAUER SUCTION HANDLE W/BULBOUS TIP: Brand: CARDINAL HEALTH

## (undated) DEVICE — ADHESIVE SKIN CLSR 0.7ML TOP DERMBND ADV

## (undated) DEVICE — SYRINGE MED 10ML TRNSLUC BRL PLUNG BLK MRK POLYPR CTRL

## (undated) DEVICE — SUTURE VCRL + SZ 2-0 L27IN ABSRB UD CT-2 L26MM 1/2 CIR TAPR VCP269H

## (undated) DEVICE — GLOVE ORANGE PI 7   MSG9070

## (undated) DEVICE — GOWN,SIRUS,POLYRNF,BRTHSLV,2XL,18/CS: Brand: MEDLINE

## (undated) DEVICE — BINDER ABD UNISX 9IN 62IN L AND XL UNIV

## (undated) DEVICE — STERILE POLYISOPRENE POWDER-FREE SURGICAL GLOVES WITH EMOLLIENT COATING: Brand: PROTEXIS

## (undated) DEVICE — TUBING SUCT 10FR MAL ALUM SHFT FN CAP VENT UNIV CONN W/ OBT

## (undated) DEVICE — STERILE POLYISOPRENE POWDER-FREE SURGICAL GLOVES: Brand: PROTEXIS

## (undated) DEVICE — GLOVE SURG BIOGEL PI ULTRATCH PF 8

## (undated) DEVICE — GLOVE ORANGE PI 8   MSG9080

## (undated) DEVICE — APPLICATOR MEDICATED 10.5 CC SOLUTION HI LT ORNG CHLORAPREP

## (undated) DEVICE — DRAPE C ARM UNIV W41XL74IN CLR PLAS XR VELC CLSR POLY STRP

## (undated) DEVICE — SUTURE ETHLN SZ 4-0 L18IN NONABSORBABLE BLK L24MM FS-1 3/8 1629H

## (undated) DEVICE — PENCIL ES L3M BTTN SWCH HOLSTER W/ BLDE ELECTRD EDGE

## (undated) DEVICE — GOWN,AURORA,NONRNF,XL,30/CS: Brand: MEDLINE

## (undated) DEVICE — CODMAN® SURGICAL PATTIES 1/2" X 3" (1.27CM X 7.62CM): Brand: CODMAN®

## (undated) DEVICE — BLADE CLP TAPR HD WET DRY CAPABILITY GTT IN CHARGING USE

## (undated) DEVICE — SYRINGE, LUER LOCK, 10ML: Brand: MEDLINE

## (undated) DEVICE — SHEET, T, LAPAROTOMY, STERILE: Brand: MEDLINE

## (undated) DEVICE — CIAGLIA BLUE RHINO PERCUTANEOUS TRACHEOSTOMY INTRODUCER TRAY: Brand: CIAGLIA BLUE RHINO

## (undated) DEVICE — YANKAUER,FLEXIBLE HANDLE,REGLR CAPACITY: Brand: MEDLINE INDUSTRIES, INC.

## (undated) DEVICE — CODMAN® SURGICAL PATTIES 1/2" X 1/2" (1.27CM X 1.27CM): Brand: CODMAN®

## (undated) DEVICE — MHPB HEAD AND NECK  PACK: Brand: MEDLINE INDUSTRIES, INC.

## (undated) DEVICE — DRAPE,THYROID,SOFT,STERILE: Brand: MEDLINE

## (undated) DEVICE — TUBING, SUCTION, 9/32" X 20', STRAIGHT: Brand: MEDLINE INDUSTRIES, INC.

## (undated) DEVICE — PACK PROCEDURE SURG LUMBAR SPINE SVMMC

## (undated) DEVICE — ST CHARLES MINOR ABDOMINAL PK: Brand: MEDLINE INDUSTRIES, INC.

## (undated) DEVICE — STANDARD HYPODERMIC NEEDLE,POLYPROPYLENE HUB: Brand: MONOJECT

## (undated) DEVICE — E-Z CLEAN, NON-STICK, PTFE COATED, ELECTROSURGICAL BLADE ELECTRODE, MODIFIED EXTENDED INSULATION, 2.5 INCH (6.35 CM): Brand: MEGADYNE

## (undated) DEVICE — SUTURE MCRYL SZ 4-0 L18IN ABSRB UD L16MM PC-3 3/8 CIR PRIM Y845G

## (undated) DEVICE — GLOVE SURG SZ 6 THK91MIL LTX FREE SYN POLYISOPRENE ANTI

## (undated) DEVICE — SINGLE PORT MANIFOLD: Brand: NEPTUNE 2